# Patient Record
Sex: MALE | Race: WHITE | Employment: OTHER | URBAN - NONMETROPOLITAN AREA
[De-identification: names, ages, dates, MRNs, and addresses within clinical notes are randomized per-mention and may not be internally consistent; named-entity substitution may affect disease eponyms.]

---

## 2017-05-15 ENCOUNTER — DOCTOR'S OFFICE (OUTPATIENT)
Dept: URBAN - NONMETROPOLITAN AREA CLINIC 1 | Facility: CLINIC | Age: 67
Setting detail: OPHTHALMOLOGY
End: 2017-05-15
Payer: COMMERCIAL

## 2017-05-15 DIAGNOSIS — H43.812: ICD-10-CM

## 2017-05-15 DIAGNOSIS — H40.052: ICD-10-CM

## 2017-05-15 DIAGNOSIS — H43.811: ICD-10-CM

## 2017-05-15 DIAGNOSIS — H44.22: ICD-10-CM

## 2017-05-15 DIAGNOSIS — H44.21: ICD-10-CM

## 2017-05-15 DIAGNOSIS — H25.13: ICD-10-CM

## 2017-05-15 DIAGNOSIS — H40.051: ICD-10-CM

## 2017-05-15 PROCEDURE — 92134 CPTRZ OPH DX IMG PST SGM RTA: CPT | Performed by: OPHTHALMOLOGY

## 2017-05-15 PROCEDURE — 92226 OPHTHALMOSCOPY EXT SUBSEQUENT: CPT | Performed by: OPHTHALMOLOGY

## 2017-05-15 PROCEDURE — 92250 FUNDUS PHOTOGRAPHY W/I&R: CPT | Performed by: OPHTHALMOLOGY

## 2017-05-15 PROCEDURE — 92235 FLUORESCEIN ANGRPH MLTIFRAME: CPT | Performed by: OPHTHALMOLOGY

## 2017-05-15 PROCEDURE — 92014 COMPRE OPH EXAM EST PT 1/>: CPT | Performed by: OPHTHALMOLOGY

## 2017-05-15 ASSESSMENT — REFRACTION_CURRENTRX
OS_CYLINDER: SPH
OD_SPHERE: +2.00
OS_SPHERE: PLANO
OD_VPRISM_DIRECTION: BF
OS_OVR_VA: 20/
OD_OVR_VA: 20/
OS_OVR_VA: 20/
OS_ADD: +2.00
OD_OVR_VA: 20/
OD_CYLINDER: SPH
OD_OVR_VA: 20/
OD_VPRISM_DIRECTION: SV
OS_OVR_VA: 20/
OD_ADD: +2.00
OS_CYLINDER: SPH
OS_VPRISM_DIRECTION: SV
OS_VPRISM_DIRECTION: BF
OD_SPHERE: PLANO
OS_SPHERE: +2.00
OD_CYLINDER: SPH

## 2017-05-15 ASSESSMENT — VISUAL ACUITY
OD_BCVA: 20/30-2
OS_BCVA: 20/50+1

## 2017-05-15 ASSESSMENT — REFRACTION_MANIFEST
OS_VA3: 20/
OS_VA1: 20/
OD_VA2: 20/
OD_VA1: 20/
OD_VA1: 20/
OU_VA: 20/
OD_AXIS: 075
OD_VA1: 20/25
OS_VA1: 20/
OS_SPHERE: +0.50
OS_VA3: 20/
OS_VA2: 20/
OD_VA2: 20/
OU_VA: 20/
OS_CYLINDER: -0.50
OU_VA: 20/
OS_VA2: 20/25
OS_VA1: 20/25
OD_SPHERE: +0.50
OD_VA2: 20/25
OS_VA2: 20/
OD_CYLINDER: -0.50
OD_VA3: 20/
OD_ADD: +2.25
OD_VA3: 20/
OS_AXIS: 090
OS_ADD: +2.25
OD_VA3: 20/
OS_VA3: 20/

## 2017-05-15 ASSESSMENT — CONFRONTATIONAL VISUAL FIELD TEST (CVF)
OD_FINDINGS: FULL
OS_FINDINGS: FULL

## 2017-05-15 ASSESSMENT — SPHEQUIV_DERIVED
OS_SPHEQUIV: 0.5
OD_SPHEQUIV: 0.25
OD_SPHEQUIV: 0.25
OS_SPHEQUIV: 0.25

## 2017-05-15 ASSESSMENT — REFRACTION_AUTOREFRACTION
OD_SPHERE: +0.50
OS_CYLINDER: -1.00
OS_AXIS: 091
OD_CYLINDER: -0.50
OD_AXIS: 074
OS_SPHERE: +1.00

## 2017-08-14 ENCOUNTER — DOCTOR'S OFFICE (OUTPATIENT)
Dept: URBAN - NONMETROPOLITAN AREA CLINIC 1 | Facility: CLINIC | Age: 67
Setting detail: OPHTHALMOLOGY
End: 2017-08-14
Payer: COMMERCIAL

## 2017-08-14 DIAGNOSIS — H40.053: ICD-10-CM

## 2017-08-14 DIAGNOSIS — H43.811: ICD-10-CM

## 2017-08-14 DIAGNOSIS — H40.052: ICD-10-CM

## 2017-08-14 DIAGNOSIS — H43.813: ICD-10-CM

## 2017-08-14 DIAGNOSIS — H40.051: ICD-10-CM

## 2017-08-14 DIAGNOSIS — H44.23: ICD-10-CM

## 2017-08-14 DIAGNOSIS — H43.812: ICD-10-CM

## 2017-08-14 DIAGNOSIS — H25.13: ICD-10-CM

## 2017-08-14 PROCEDURE — 92134 CPTRZ OPH DX IMG PST SGM RTA: CPT | Performed by: OPHTHALMOLOGY

## 2017-08-14 PROCEDURE — 92014 COMPRE OPH EXAM EST PT 1/>: CPT | Performed by: OPHTHALMOLOGY

## 2017-08-14 PROCEDURE — 92226 OPHTHALMOSCOPY EXT SUBSEQUENT: CPT | Performed by: OPHTHALMOLOGY

## 2017-08-14 ASSESSMENT — REFRACTION_MANIFEST
OD_VA1: 20/
OD_VA3: 20/
OD_VA3: 20/
OD_VA1: 20/25
OD_ADD: +2.25
OS_VA3: 20/
OU_VA: 20/
OD_VA2: 20/
OS_VA3: 20/
OU_VA: 20/
OS_VA2: 20/25
OD_VA2: 20/25
OD_VA1: 20/
OS_VA2: 20/
OD_VA3: 20/
OS_VA3: 20/
OD_VA2: 20/
OS_SPHERE: +0.50
OS_VA1: 20/
OD_CYLINDER: -0.50
OD_AXIS: 075
OS_AXIS: 090
OS_VA1: 20/25
OS_ADD: +2.25
OS_CYLINDER: -0.50
OD_SPHERE: +0.50
OS_VA1: 20/
OS_VA2: 20/
OU_VA: 20/

## 2017-08-14 ASSESSMENT — REFRACTION_AUTOREFRACTION
OS_SPHERE: +1.00
OS_AXIS: 091
OD_AXIS: 074
OD_CYLINDER: -0.50
OD_SPHERE: +0.50
OS_CYLINDER: -1.00

## 2017-08-14 ASSESSMENT — REFRACTION_CURRENTRX
OS_OVR_VA: 20/
OS_CYLINDER: SPH
OS_OVR_VA: 20/
OS_OVR_VA: 20/
OD_ADD: +2.00
OD_VPRISM_DIRECTION: BF
OD_OVR_VA: 20/
OD_CYLINDER: SPH
OD_OVR_VA: 20/
OD_SPHERE: +2.00
OS_CYLINDER: SPH
OS_VPRISM_DIRECTION: SV
OD_VPRISM_DIRECTION: SV
OD_OVR_VA: 20/
OD_SPHERE: PLANO
OS_ADD: +2.00
OS_VPRISM_DIRECTION: BF
OS_SPHERE: PLANO
OS_SPHERE: +2.00
OD_CYLINDER: SPH

## 2017-08-14 ASSESSMENT — SPHEQUIV_DERIVED
OD_SPHEQUIV: 0.25
OS_SPHEQUIV: 0.25
OD_SPHEQUIV: 0.25
OS_SPHEQUIV: 0.5

## 2017-08-14 ASSESSMENT — CONFRONTATIONAL VISUAL FIELD TEST (CVF)
OD_FINDINGS: FULL
OS_FINDINGS: FULL

## 2017-08-14 ASSESSMENT — VISUAL ACUITY
OD_BCVA: 20/25+2
OS_BCVA: 20/50+2

## 2017-09-18 ENCOUNTER — DOCTOR'S OFFICE (OUTPATIENT)
Dept: URBAN - NONMETROPOLITAN AREA CLINIC 1 | Facility: CLINIC | Age: 67
Setting detail: OPHTHALMOLOGY
End: 2017-09-18

## 2017-09-18 DIAGNOSIS — H52.4: ICD-10-CM

## 2017-09-18 DIAGNOSIS — H52.03: ICD-10-CM

## 2017-09-18 PROCEDURE — 92015 DETERMINE REFRACTIVE STATE: CPT | Performed by: OPTOMETRIST

## 2017-09-18 ASSESSMENT — REFRACTION_CURRENTRX
OD_CYLINDER: SPH
OD_OVR_VA: 20/
OD_AXIS: 076
OD_VPRISM_DIRECTION: SV
OS_OVR_VA: 20/
OS_CYLINDER: SPH
OD_ADD: +2.00
OD_OVR_VA: 20/
OD_OVR_VA: 20/
OS_ADD: +2.00
OS_VPRISM_DIRECTION: SV
OS_VPRISM_DIRECTION: BF
OD_SPHERE: PLANO
OD_SPHERE: +2.75
OS_AXIS: 089
OS_SPHERE: PLANO
OD_CYLINDER: -0.50
OS_SPHERE: +2.75
OD_VPRISM_DIRECTION: BF
OS_CYLINDER: -0.50

## 2017-09-18 ASSESSMENT — SPHEQUIV_DERIVED
OD_SPHEQUIV: 0.375
OS_SPHEQUIV: 0.25

## 2017-09-18 ASSESSMENT — REFRACTION_OUTSIDERX
OD_SPHERE: +0.75
OD_CYLINDER: -0.75
OS_VA1: 20/25
OS_VA2: 20/25
OS_ADD: +2.25
OD_VA1: 20/25
OS_AXIS: 090
OD_VA3: 20/
OD_AXIS: 080
OS_VA3: 20/
OD_VA2: 20/25
OS_SPHERE: +0.50
OS_CYLINDER: -0.50
OD_ADD: +2.25
OU_VA: 20/

## 2017-09-18 ASSESSMENT — REFRACTION_MANIFEST
OD_VA3: 20/
OS_VA1: 20/
OD_VA1: 20/
OD_VA2: 20/
OD_VA3: 20/
OS_VA3: 20/
OD_VA2: 20/
OU_VA: 20/
OS_VA2: 20/
OS_VA2: 20/
OS_VA3: 20/
OS_VA1: 20/
OD_VA1: 20/
OU_VA: 20/

## 2017-09-18 ASSESSMENT — REFRACTION_AUTOREFRACTION
OD_SPHERE: +0.75
OS_AXIS: 087
OS_CYLINDER: -0.50
OS_SPHERE: +0.50
OD_CYLINDER: -0.75
OD_AXIS: 080

## 2017-09-18 ASSESSMENT — VISUAL ACUITY
OS_BCVA: 20/40+2
OD_BCVA: 20/20-2

## 2017-10-16 ENCOUNTER — DOCTOR'S OFFICE (OUTPATIENT)
Dept: URBAN - NONMETROPOLITAN AREA CLINIC 1 | Facility: CLINIC | Age: 67
Setting detail: OPHTHALMOLOGY
End: 2017-10-16
Payer: COMMERCIAL

## 2017-10-16 DIAGNOSIS — H43.813: ICD-10-CM

## 2017-10-16 DIAGNOSIS — H43.811: ICD-10-CM

## 2017-10-16 DIAGNOSIS — H43.812: ICD-10-CM

## 2017-10-16 DIAGNOSIS — H40.053: ICD-10-CM

## 2017-10-16 DIAGNOSIS — H44.21: ICD-10-CM

## 2017-10-16 DIAGNOSIS — H25.13: ICD-10-CM

## 2017-10-16 PROCEDURE — 92226 OPHTHALMOSCOPY EXT SUBSEQUENT: CPT | Performed by: OPHTHALMOLOGY

## 2017-10-16 PROCEDURE — 99214 OFFICE O/P EST MOD 30 MIN: CPT | Performed by: OPHTHALMOLOGY

## 2017-10-16 PROCEDURE — 92134 CPTRZ OPH DX IMG PST SGM RTA: CPT | Performed by: OPHTHALMOLOGY

## 2017-10-16 ASSESSMENT — REFRACTION_CURRENTRX
OS_CYLINDER: SPH
OS_CYLINDER: -0.50
OD_SPHERE: +2.75
OS_VPRISM_DIRECTION: BF
OD_OVR_VA: 20/
OD_CYLINDER: -0.50
OD_AXIS: 076
OD_ADD: +2.00
OS_OVR_VA: 20/
OD_VPRISM_DIRECTION: BF
OS_OVR_VA: 20/
OD_SPHERE: PLANO
OD_VPRISM_DIRECTION: SV
OD_OVR_VA: 20/
OS_SPHERE: PLANO
OD_OVR_VA: 20/
OS_ADD: +2.00
OS_AXIS: 089
OS_VPRISM_DIRECTION: SV
OD_CYLINDER: SPH
OS_SPHERE: +2.75
OS_OVR_VA: 20/

## 2017-10-16 ASSESSMENT — REFRACTION_AUTOREFRACTION
OD_CYLINDER: -0.75
OS_SPHERE: +0.50
OS_CYLINDER: -0.50
OS_AXIS: 087
OD_SPHERE: +0.75
OD_AXIS: 080

## 2017-10-16 ASSESSMENT — REFRACTION_OUTSIDERX
OD_SPHERE: +0.75
OD_AXIS: 080
OS_VA2: 20/25
OD_VA1: 20/25
OD_VA2: 20/25
OS_VA3: 20/
OU_VA: 20/
OS_AXIS: 090
OD_VA3: 20/
OS_ADD: +2.25
OS_CYLINDER: -0.50
OD_ADD: +2.25
OS_SPHERE: +0.50
OD_CYLINDER: -0.75
OS_VA1: 20/25

## 2017-10-16 ASSESSMENT — REFRACTION_MANIFEST
OD_VA2: 20/
OU_VA: 20/
OS_VA2: 20/
OD_VA3: 20/
OD_VA2: 20/
OS_VA1: 20/
OD_VA3: 20/
OS_VA3: 20/
OD_VA1: 20/
OS_VA1: 20/
OS_VA2: 20/
OS_VA3: 20/
OU_VA: 20/
OD_VA1: 20/

## 2017-10-16 ASSESSMENT — SPHEQUIV_DERIVED
OD_SPHEQUIV: 0.375
OS_SPHEQUIV: 0.25

## 2017-10-16 ASSESSMENT — CONFRONTATIONAL VISUAL FIELD TEST (CVF)
OD_FINDINGS: FULL
OS_FINDINGS: FULL

## 2017-10-16 ASSESSMENT — VISUAL ACUITY
OS_BCVA: 20/40
OD_BCVA: 20/25

## 2018-03-19 ENCOUNTER — DOCTOR'S OFFICE (OUTPATIENT)
Dept: URBAN - NONMETROPOLITAN AREA CLINIC 1 | Facility: CLINIC | Age: 68
Setting detail: OPHTHALMOLOGY
End: 2018-03-19
Payer: COMMERCIAL

## 2018-03-19 DIAGNOSIS — H44.21: ICD-10-CM

## 2018-03-19 DIAGNOSIS — H25.13: ICD-10-CM

## 2018-03-19 DIAGNOSIS — H40.053: ICD-10-CM

## 2018-03-19 DIAGNOSIS — H43.812: ICD-10-CM

## 2018-03-19 DIAGNOSIS — H43.811: ICD-10-CM

## 2018-03-19 DIAGNOSIS — H43.813: ICD-10-CM

## 2018-03-19 PROCEDURE — 92014 COMPRE OPH EXAM EST PT 1/>: CPT | Performed by: OPHTHALMOLOGY

## 2018-03-19 PROCEDURE — 92226 OPHTHALMOSCOPY EXT SUBSEQUENT: CPT | Performed by: OPHTHALMOLOGY

## 2018-03-19 PROCEDURE — 92134 CPTRZ OPH DX IMG PST SGM RTA: CPT | Performed by: OPHTHALMOLOGY

## 2018-03-19 ASSESSMENT — REFRACTION_AUTOREFRACTION
OS_AXIS: 087
OS_CYLINDER: -0.50
OD_CYLINDER: -0.75
OD_AXIS: 080
OS_SPHERE: +0.50
OD_SPHERE: +0.75

## 2018-03-19 ASSESSMENT — SPHEQUIV_DERIVED
OS_SPHEQUIV: 0.25
OD_SPHEQUIV: 0.375

## 2018-03-19 ASSESSMENT — REFRACTION_OUTSIDERX
OD_VA2: 20/25
OS_SPHERE: +0.50
OD_VA1: 20/25
OS_AXIS: 090
OD_AXIS: 080
OD_VA3: 20/
OD_CYLINDER: -0.75
OS_VA1: 20/25
OS_ADD: +2.25
OU_VA: 20/
OS_VA3: 20/
OD_SPHERE: +0.75
OS_VA2: 20/25
OS_CYLINDER: -0.50
OD_ADD: +2.25

## 2018-03-19 ASSESSMENT — REFRACTION_MANIFEST
OS_VA1: 20/
OS_VA3: 20/
OU_VA: 20/
OD_VA2: 20/
OS_VA2: 20/
OD_VA1: 20/
OD_VA3: 20/
OD_VA2: 20/
OS_VA1: 20/
OD_VA3: 20/
OS_VA3: 20/
OU_VA: 20/
OD_VA1: 20/
OS_VA2: 20/

## 2018-03-19 ASSESSMENT — REFRACTION_CURRENTRX
OD_OVR_VA: 20/
OD_CYLINDER: -0.50
OD_SPHERE: PLANO
OS_SPHERE: +2.75
OS_CYLINDER: -0.50
OD_AXIS: 076
OD_SPHERE: +2.75
OS_OVR_VA: 20/
OS_VPRISM_DIRECTION: BF
OS_SPHERE: PLANO
OS_ADD: +2.00
OS_OVR_VA: 20/
OD_VPRISM_DIRECTION: SV
OD_OVR_VA: 20/
OS_OVR_VA: 20/
OS_CYLINDER: SPH
OD_OVR_VA: 20/
OS_AXIS: 089
OD_CYLINDER: SPH
OS_VPRISM_DIRECTION: SV
OD_ADD: +2.00
OD_VPRISM_DIRECTION: BF

## 2018-03-19 ASSESSMENT — CONFRONTATIONAL VISUAL FIELD TEST (CVF)
OS_FINDINGS: FULL
OD_FINDINGS: FULL

## 2018-03-19 ASSESSMENT — VISUAL ACUITY
OD_BCVA: 20/20-2
OS_BCVA: 20/40-1

## 2018-05-14 ENCOUNTER — DOCTOR'S OFFICE (OUTPATIENT)
Dept: URBAN - NONMETROPOLITAN AREA CLINIC 1 | Facility: CLINIC | Age: 68
Setting detail: OPHTHALMOLOGY
End: 2018-05-14
Payer: COMMERCIAL

## 2018-05-14 DIAGNOSIS — H40.052: ICD-10-CM

## 2018-05-14 DIAGNOSIS — H40.051: ICD-10-CM

## 2018-05-14 PROCEDURE — 76514 ECHO EXAM OF EYE THICKNESS: CPT | Performed by: OPHTHALMOLOGY

## 2018-05-14 PROCEDURE — 92083 EXTENDED VISUAL FIELD XM: CPT | Performed by: OPHTHALMOLOGY

## 2018-06-18 ENCOUNTER — DOCTOR'S OFFICE (OUTPATIENT)
Dept: URBAN - NONMETROPOLITAN AREA CLINIC 1 | Facility: CLINIC | Age: 68
Setting detail: OPHTHALMOLOGY
End: 2018-06-18
Payer: COMMERCIAL

## 2018-06-18 DIAGNOSIS — H40.053: ICD-10-CM

## 2018-06-18 DIAGNOSIS — H44.21: ICD-10-CM

## 2018-06-18 DIAGNOSIS — H43.812: ICD-10-CM

## 2018-06-18 DIAGNOSIS — H43.811: ICD-10-CM

## 2018-06-18 DIAGNOSIS — H43.813: ICD-10-CM

## 2018-06-18 DIAGNOSIS — H25.13: ICD-10-CM

## 2018-06-18 PROCEDURE — 92226 OPHTHALMOSCOPY EXT SUBSEQUENT: CPT | Performed by: OPHTHALMOLOGY

## 2018-06-18 PROCEDURE — 92134 CPTRZ OPH DX IMG PST SGM RTA: CPT | Performed by: OPHTHALMOLOGY

## 2018-06-18 PROCEDURE — 92014 COMPRE OPH EXAM EST PT 1/>: CPT | Performed by: OPHTHALMOLOGY

## 2018-06-18 ASSESSMENT — REFRACTION_CURRENTRX
OS_OVR_VA: 20/
OD_OVR_VA: 20/
OS_CYLINDER: -0.50
OD_CYLINDER: -0.50
OD_VPRISM_DIRECTION: BF
OS_OVR_VA: 20/
OD_AXIS: 076
OD_OVR_VA: 20/
OD_SPHERE: +2.75
OD_VPRISM_DIRECTION: SV
OS_AXIS: 089
OD_ADD: +2.00
OS_SPHERE: PLANO
OS_ADD: +2.00
OS_CYLINDER: SPH
OD_OVR_VA: 20/
OD_CYLINDER: SPH
OS_SPHERE: +2.75
OD_SPHERE: PLANO
OS_OVR_VA: 20/
OS_VPRISM_DIRECTION: BF
OS_VPRISM_DIRECTION: SV

## 2018-06-18 ASSESSMENT — REFRACTION_MANIFEST
OU_VA: 20/
OD_VA1: 20/
OS_VA3: 20/
OU_VA: 20/
OS_VA3: 20/
OD_VA2: 20/
OD_VA3: 20/
OD_VA1: 20/
OS_VA1: 20/
OS_VA2: 20/
OD_VA2: 20/
OS_VA1: 20/
OS_VA2: 20/
OD_VA3: 20/

## 2018-06-18 ASSESSMENT — REFRACTION_OUTSIDERX
OD_CYLINDER: -0.75
OS_CYLINDER: -0.50
OS_ADD: +2.25
OD_VA2: 20/25
OD_VA3: 20/
OS_VA1: 20/25
OS_AXIS: 090
OD_ADD: +2.25
OS_SPHERE: +0.50
OD_VA1: 20/25
OD_AXIS: 080
OU_VA: 20/
OS_VA3: 20/
OD_SPHERE: +0.75
OS_VA2: 20/25

## 2018-06-18 ASSESSMENT — VISUAL ACUITY
OS_BCVA: 20/50+2
OD_BCVA: 20/25-2

## 2018-06-18 ASSESSMENT — SPHEQUIV_DERIVED
OD_SPHEQUIV: 0.375
OS_SPHEQUIV: 0.25

## 2018-06-18 ASSESSMENT — REFRACTION_AUTOREFRACTION
OS_AXIS: 087
OS_SPHERE: +0.50
OS_CYLINDER: -0.50
OD_SPHERE: +0.75
OD_CYLINDER: -0.75
OD_AXIS: 080

## 2018-06-18 ASSESSMENT — CONFRONTATIONAL VISUAL FIELD TEST (CVF)
OD_FINDINGS: FULL
OS_FINDINGS: FULL

## 2018-08-17 ENCOUNTER — DOCTOR'S OFFICE (OUTPATIENT)
Dept: URBAN - NONMETROPOLITAN AREA CLINIC 1 | Facility: CLINIC | Age: 68
Setting detail: OPHTHALMOLOGY
End: 2018-08-17
Payer: COMMERCIAL

## 2018-08-17 DIAGNOSIS — H53.123: ICD-10-CM

## 2018-08-17 DIAGNOSIS — H43.811: ICD-10-CM

## 2018-08-17 DIAGNOSIS — H44.21: ICD-10-CM

## 2018-08-17 DIAGNOSIS — H43.812: ICD-10-CM

## 2018-08-17 DIAGNOSIS — H43.813: ICD-10-CM

## 2018-08-17 DIAGNOSIS — H40.053: ICD-10-CM

## 2018-08-17 DIAGNOSIS — H25.13: ICD-10-CM

## 2018-08-17 PROCEDURE — 92134 CPTRZ OPH DX IMG PST SGM RTA: CPT | Performed by: OPHTHALMOLOGY

## 2018-08-17 PROCEDURE — 92235 FLUORESCEIN ANGRPH MLTIFRAME: CPT | Performed by: OPHTHALMOLOGY

## 2018-08-17 PROCEDURE — 92226 OPHTHALMOSCOPY EXT SUBSEQUENT: CPT | Performed by: OPHTHALMOLOGY

## 2018-08-17 PROCEDURE — 92250 FUNDUS PHOTOGRAPHY W/I&R: CPT | Performed by: OPHTHALMOLOGY

## 2018-08-17 PROCEDURE — 92014 COMPRE OPH EXAM EST PT 1/>: CPT | Performed by: OPHTHALMOLOGY

## 2018-08-17 ASSESSMENT — REFRACTION_AUTOREFRACTION
OD_SPHERE: +0.75
OS_SPHERE: +0.50
OD_CYLINDER: -0.75
OD_AXIS: 080
OS_AXIS: 087
OS_CYLINDER: -0.50

## 2018-08-17 ASSESSMENT — REFRACTION_CURRENTRX
OS_OVR_VA: 20/
OS_VPRISM_DIRECTION: SV
OD_OVR_VA: 20/
OS_SPHERE: +2.75
OD_CYLINDER: SPH
OS_CYLINDER: -0.50
OS_ADD: +2.00
OS_VPRISM_DIRECTION: BF
OD_SPHERE: +2.75
OD_ADD: +2.00
OD_VPRISM_DIRECTION: SV
OD_CYLINDER: -0.50
OS_SPHERE: PLANO
OS_AXIS: 089
OD_OVR_VA: 20/
OS_CYLINDER: SPH
OD_VPRISM_DIRECTION: BF
OD_SPHERE: PLANO
OS_OVR_VA: 20/
OD_OVR_VA: 20/
OD_AXIS: 076
OS_OVR_VA: 20/

## 2018-08-17 ASSESSMENT — REFRACTION_OUTSIDERX
OD_VA3: 20/
OS_SPHERE: +0.50
OD_ADD: +2.25
OS_ADD: +2.25
OS_AXIS: 090
OS_CYLINDER: -0.50
OS_VA3: 20/
OS_VA2: 20/25
OD_CYLINDER: -0.75
OD_SPHERE: +0.75
OU_VA: 20/
OD_VA2: 20/25
OS_VA1: 20/25
OD_VA1: 20/25
OD_AXIS: 080

## 2018-08-17 ASSESSMENT — REFRACTION_MANIFEST
OS_VA3: 20/
OD_VA2: 20/
OS_VA3: 20/
OD_VA3: 20/
OS_VA2: 20/
OD_VA3: 20/
OU_VA: 20/
OD_VA2: 20/
OS_VA2: 20/
OD_VA1: 20/
OU_VA: 20/
OD_VA1: 20/
OS_VA1: 20/
OS_VA1: 20/

## 2018-08-17 ASSESSMENT — CONFRONTATIONAL VISUAL FIELD TEST (CVF)
OS_FINDINGS: FULL
OD_FINDINGS: FULL

## 2018-08-17 ASSESSMENT — VISUAL ACUITY
OS_BCVA: 20/40-2
OD_BCVA: 20/30-1

## 2018-08-17 ASSESSMENT — SPHEQUIV_DERIVED
OD_SPHEQUIV: 0.375
OS_SPHEQUIV: 0.25

## 2018-08-24 ENCOUNTER — DOCTOR'S OFFICE (OUTPATIENT)
Dept: URBAN - NONMETROPOLITAN AREA CLINIC 1 | Facility: CLINIC | Age: 68
Setting detail: OPHTHALMOLOGY
End: 2018-08-24
Payer: COMMERCIAL

## 2018-08-24 ENCOUNTER — RX ONLY (RX ONLY)
Age: 68
End: 2018-08-24

## 2018-08-24 DIAGNOSIS — H25.13: ICD-10-CM

## 2018-08-24 DIAGNOSIS — H40.053: ICD-10-CM

## 2018-08-24 DIAGNOSIS — H44.21: ICD-10-CM

## 2018-08-24 DIAGNOSIS — H43.813: ICD-10-CM

## 2018-08-24 DIAGNOSIS — H53.123: ICD-10-CM

## 2018-08-24 PROCEDURE — 92133 CPTRZD OPH DX IMG PST SGM ON: CPT | Performed by: OPHTHALMOLOGY

## 2018-08-24 PROCEDURE — 92014 COMPRE OPH EXAM EST PT 1/>: CPT | Performed by: OPHTHALMOLOGY

## 2018-08-24 ASSESSMENT — SPHEQUIV_DERIVED
OS_SPHEQUIV: 0.875
OD_SPHEQUIV: 0.75

## 2018-08-24 ASSESSMENT — REFRACTION_OUTSIDERX
OS_CYLINDER: -0.50
OS_ADD: +2.25
OD_ADD: +2.25
OD_CYLINDER: -0.75
OS_VA1: 20/25
OS_SPHERE: +0.50
OD_VA2: 20/25
OD_SPHERE: +0.75
OS_VA2: 20/25
OU_VA: 20/
OS_VA3: 20/
OD_VA1: 20/25
OD_AXIS: 080
OD_VA3: 20/
OS_AXIS: 090

## 2018-08-24 ASSESSMENT — REFRACTION_CURRENTRX
OS_SPHERE: +2.75
OS_OVR_VA: 20/
OD_OVR_VA: 20/
OS_OVR_VA: 20/
OS_VPRISM_DIRECTION: BF
OD_CYLINDER: SPH
OD_CYLINDER: -0.50
OS_SPHERE: PLANO
OS_AXIS: 089
OD_SPHERE: PLANO
OD_VPRISM_DIRECTION: SV
OS_ADD: +2.00
OD_AXIS: 076
OD_OVR_VA: 20/
OD_OVR_VA: 20/
OD_VPRISM_DIRECTION: BF
OD_ADD: +2.00
OS_VPRISM_DIRECTION: SV
OS_CYLINDER: -0.50
OS_CYLINDER: SPH
OD_SPHERE: +2.75
OS_OVR_VA: 20/

## 2018-08-24 ASSESSMENT — VISUAL ACUITY
OS_BCVA: 20/30-2
OD_BCVA: 20/25-2

## 2018-08-24 ASSESSMENT — REFRACTION_MANIFEST
OD_VA3: 20/
OU_VA: 20/
OS_VA1: 20/
OS_VA3: 20/
OS_VA2: 20/
OD_VA1: 20/
OD_VA1: 20/
OU_VA: 20/
OS_VA1: 20/
OD_VA2: 20/
OS_VA3: 20/
OD_VA2: 20/
OS_VA2: 20/
OD_VA3: 20/

## 2018-08-24 ASSESSMENT — REFRACTION_AUTOREFRACTION
OD_SPHERE: +1.00
OS_CYLINDER: -1.25
OS_SPHERE: +1.50
OD_CYLINDER: -0.50
OS_AXIS: 090
OD_AXIS: 099

## 2018-08-24 ASSESSMENT — CONFRONTATIONAL VISUAL FIELD TEST (CVF)
OS_FINDINGS: FULL
OD_FINDINGS: FULL

## 2018-10-19 ENCOUNTER — DOCTOR'S OFFICE (OUTPATIENT)
Dept: URBAN - NONMETROPOLITAN AREA CLINIC 1 | Facility: CLINIC | Age: 68
Setting detail: OPHTHALMOLOGY
End: 2018-10-19
Payer: COMMERCIAL

## 2018-10-19 DIAGNOSIS — H04.122: ICD-10-CM

## 2018-10-19 DIAGNOSIS — H43.811: ICD-10-CM

## 2018-10-19 DIAGNOSIS — H40.053: ICD-10-CM

## 2018-10-19 DIAGNOSIS — H43.812: ICD-10-CM

## 2018-10-19 DIAGNOSIS — H04.121: ICD-10-CM

## 2018-10-19 DIAGNOSIS — H44.21: ICD-10-CM

## 2018-10-19 DIAGNOSIS — H43.813: ICD-10-CM

## 2018-10-19 PROCEDURE — 92134 CPTRZ OPH DX IMG PST SGM RTA: CPT | Performed by: OPHTHALMOLOGY

## 2018-10-19 PROCEDURE — 83861 MICROFLUID ANALY TEARS: CPT | Performed by: OPHTHALMOLOGY

## 2018-10-19 PROCEDURE — 92014 COMPRE OPH EXAM EST PT 1/>: CPT | Performed by: OPHTHALMOLOGY

## 2018-10-19 PROCEDURE — 92226 OPHTHALMOSCOPY EXT SUBSEQUENT: CPT | Performed by: OPHTHALMOLOGY

## 2018-10-19 ASSESSMENT — REFRACTION_AUTOREFRACTION
OD_SPHERE: +1.00
OD_AXIS: 099
OD_CYLINDER: -0.50
OS_AXIS: 090
OS_SPHERE: +1.50
OS_CYLINDER: -1.25

## 2018-10-19 ASSESSMENT — REFRACTION_CURRENTRX
OS_CYLINDER: SPH
OS_CYLINDER: -0.50
OD_VPRISM_DIRECTION: BF
OS_SPHERE: PLANO
OD_SPHERE: +2.75
OS_VPRISM_DIRECTION: BF
OS_OVR_VA: 20/
OD_OVR_VA: 20/
OS_OVR_VA: 20/
OD_CYLINDER: SPH
OS_AXIS: 089
OS_SPHERE: +2.75
OD_SPHERE: PLANO
OD_OVR_VA: 20/
OS_VPRISM_DIRECTION: SV
OD_ADD: +2.00
OD_VPRISM_DIRECTION: SV
OD_AXIS: 076
OD_OVR_VA: 20/
OS_ADD: +2.00
OD_CYLINDER: -0.50
OS_OVR_VA: 20/

## 2018-10-19 ASSESSMENT — REFRACTION_MANIFEST
OS_VA3: 20/
OD_VA1: 20/
OS_VA2: 20/
OU_VA: 20/
OS_CYLINDER: -0.50
OS_VA3: 20/
OD_AXIS: 080
OS_VA2: 20/25
OD_SPHERE: +0.75
OS_VA1: 20/
OD_ADD: +2.25
OD_VA2: 20/25
OS_ADD: +2.25
OD_VA3: 20/
OS_AXIS: 090
OD_VA2: 20/
OS_SPHERE: +0.50
OD_VA1: 20/25
OD_CYLINDER: -0.75
OU_VA: 20/
OD_VA3: 20/
OS_VA1: 20/25

## 2018-10-19 ASSESSMENT — CONFRONTATIONAL VISUAL FIELD TEST (CVF)
OD_FINDINGS: FULL
OS_FINDINGS: FULL

## 2018-10-19 ASSESSMENT — SPHEQUIV_DERIVED
OS_SPHEQUIV: 0.875
OD_SPHEQUIV: 0.375
OD_SPHEQUIV: 0.75
OS_SPHEQUIV: 0.25

## 2018-10-19 ASSESSMENT — VISUAL ACUITY
OS_BCVA: 20/30-2
OD_BCVA: 20/30-2

## 2018-12-10 ENCOUNTER — DOCTOR'S OFFICE (OUTPATIENT)
Dept: URBAN - NONMETROPOLITAN AREA CLINIC 1 | Facility: CLINIC | Age: 68
Setting detail: OPHTHALMOLOGY
End: 2018-12-10
Payer: COMMERCIAL

## 2018-12-10 DIAGNOSIS — H44.21: ICD-10-CM

## 2018-12-10 DIAGNOSIS — H04.122: ICD-10-CM

## 2018-12-10 DIAGNOSIS — H40.053: ICD-10-CM

## 2018-12-10 DIAGNOSIS — H43.813: ICD-10-CM

## 2018-12-10 DIAGNOSIS — H04.121: ICD-10-CM

## 2018-12-10 PROCEDURE — 92014 COMPRE OPH EXAM EST PT 1/>: CPT | Performed by: OPHTHALMOLOGY

## 2018-12-10 PROCEDURE — 83861 MICROFLUID ANALY TEARS: CPT | Performed by: OPHTHALMOLOGY

## 2018-12-10 PROCEDURE — 92134 CPTRZ OPH DX IMG PST SGM RTA: CPT | Performed by: OPHTHALMOLOGY

## 2018-12-10 ASSESSMENT — REFRACTION_CURRENTRX
OD_OVR_VA: 20/
OD_OVR_VA: 20/
OD_VPRISM_DIRECTION: BF
OS_AXIS: 089
OD_CYLINDER: -0.50
OS_ADD: +2.00
OD_CYLINDER: SPH
OD_ADD: +2.00
OD_OVR_VA: 20/
OD_VPRISM_DIRECTION: SV
OD_AXIS: 076
OS_CYLINDER: SPH
OS_OVR_VA: 20/
OS_SPHERE: PLANO
OD_SPHERE: PLANO
OS_VPRISM_DIRECTION: BF
OS_OVR_VA: 20/
OS_VPRISM_DIRECTION: SV
OS_CYLINDER: -0.50
OD_SPHERE: +2.75
OS_SPHERE: +2.75
OS_OVR_VA: 20/

## 2018-12-10 ASSESSMENT — REFRACTION_AUTOREFRACTION
OD_CYLINDER: -0.50
OD_SPHERE: +1.00
OS_SPHERE: +1.50
OD_AXIS: 099
OS_AXIS: 090
OS_CYLINDER: -1.25

## 2018-12-10 ASSESSMENT — REFRACTION_MANIFEST
OS_VA1: 20/
OD_CYLINDER: -0.75
OD_AXIS: 080
OD_ADD: +2.25
OD_VA1: 20/
OS_VA3: 20/
OD_SPHERE: +0.75
OD_VA1: 20/25
OD_VA2: 20/
OS_VA3: 20/
OS_AXIS: 090
OD_VA3: 20/
OS_ADD: +2.25
OU_VA: 20/
OD_VA3: 20/
OS_VA2: 20/25
OS_VA2: 20/
OD_VA2: 20/25
OU_VA: 20/
OS_CYLINDER: -0.50
OS_SPHERE: +0.50
OS_VA1: 20/25

## 2018-12-10 ASSESSMENT — VISUAL ACUITY
OD_BCVA: 20/25-2
OS_BCVA: 20/25-2

## 2018-12-10 ASSESSMENT — SPHEQUIV_DERIVED
OS_SPHEQUIV: 0.875
OD_SPHEQUIV: 0.75
OD_SPHEQUIV: 0.375
OS_SPHEQUIV: 0.25

## 2018-12-10 ASSESSMENT — CONFRONTATIONAL VISUAL FIELD TEST (CVF)
OD_FINDINGS: FULL
OS_FINDINGS: FULL

## 2019-05-30 ENCOUNTER — DOCTOR'S OFFICE (OUTPATIENT)
Dept: URBAN - NONMETROPOLITAN AREA CLINIC 1 | Facility: CLINIC | Age: 69
Setting detail: OPHTHALMOLOGY
End: 2019-05-30
Payer: COMMERCIAL

## 2019-05-30 DIAGNOSIS — H43.812: ICD-10-CM

## 2019-05-30 DIAGNOSIS — H43.811: ICD-10-CM

## 2019-05-30 DIAGNOSIS — H35.3232: ICD-10-CM

## 2019-05-30 DIAGNOSIS — H43.813: ICD-10-CM

## 2019-05-30 DIAGNOSIS — H04.122: ICD-10-CM

## 2019-05-30 DIAGNOSIS — H25.13: ICD-10-CM

## 2019-05-30 DIAGNOSIS — H04.123: ICD-10-CM

## 2019-05-30 DIAGNOSIS — H04.121: ICD-10-CM

## 2019-05-30 PROCEDURE — 83861 MICROFLUID ANALY TEARS: CPT | Performed by: OPHTHALMOLOGY

## 2019-05-30 PROCEDURE — 92250 FUNDUS PHOTOGRAPHY W/I&R: CPT | Performed by: OPHTHALMOLOGY

## 2019-05-30 PROCEDURE — 92235 FLUORESCEIN ANGRPH MLTIFRAME: CPT | Performed by: OPHTHALMOLOGY

## 2019-05-30 PROCEDURE — 92134 CPTRZ OPH DX IMG PST SGM RTA: CPT | Performed by: OPHTHALMOLOGY

## 2019-05-30 PROCEDURE — 92226 OPHTHALMOSCOPY EXT SUBSEQUENT: CPT | Performed by: OPHTHALMOLOGY

## 2019-05-30 PROCEDURE — 92014 COMPRE OPH EXAM EST PT 1/>: CPT | Performed by: OPHTHALMOLOGY

## 2019-05-30 ASSESSMENT — REFRACTION_CURRENTRX
OD_SPHERE: +2.75
OS_SPHERE: +2.75
OD_VPRISM_DIRECTION: BF
OS_CYLINDER: -0.50
OS_CYLINDER: SPH
OD_ADD: +2.00
OS_VPRISM_DIRECTION: BF
OD_CYLINDER: -0.50
OS_VPRISM_DIRECTION: SV
OD_CYLINDER: SPH
OD_OVR_VA: 20/
OS_OVR_VA: 20/
OD_OVR_VA: 20/
OD_VPRISM_DIRECTION: SV
OS_SPHERE: PLANO
OS_ADD: +2.00
OS_OVR_VA: 20/
OD_AXIS: 076
OD_SPHERE: PLANO
OD_OVR_VA: 20/
OS_OVR_VA: 20/
OS_AXIS: 089

## 2019-05-30 ASSESSMENT — REFRACTION_MANIFEST
OS_VA1: 20/25
OD_SPHERE: +0.75
OD_VA1: 20/
OU_VA: 20/
OD_CYLINDER: -0.75
OD_VA3: 20/
OS_SPHERE: +0.50
OD_VA2: 20/
OS_VA3: 20/
OD_AXIS: 080
OD_VA1: 20/25
OS_ADD: +2.25
OS_AXIS: 090
OU_VA: 20/
OD_ADD: +2.25
OD_VA2: 20/25
OD_VA3: 20/
OS_VA1: 20/
OS_VA2: 20/
OS_VA3: 20/
OS_VA2: 20/25
OS_CYLINDER: -0.50

## 2019-05-30 ASSESSMENT — SPHEQUIV_DERIVED
OS_SPHEQUIV: 0.875
OD_SPHEQUIV: 0.75
OD_SPHEQUIV: 0.375
OS_SPHEQUIV: 0.25

## 2019-05-30 ASSESSMENT — REFRACTION_AUTOREFRACTION
OS_SPHERE: +1.50
OS_AXIS: 090
OD_AXIS: 099
OD_CYLINDER: -0.50
OS_CYLINDER: -1.25
OD_SPHERE: +1.00

## 2019-05-30 ASSESSMENT — CONFRONTATIONAL VISUAL FIELD TEST (CVF)
OD_FINDINGS: FULL
OS_FINDINGS: FULL

## 2019-05-30 ASSESSMENT — VISUAL ACUITY
OS_BCVA: 20/40+2
OD_BCVA: 20/30-1

## 2019-06-05 ENCOUNTER — DOCTOR'S OFFICE (OUTPATIENT)
Dept: URBAN - NONMETROPOLITAN AREA CLINIC 1 | Facility: CLINIC | Age: 69
Setting detail: OPHTHALMOLOGY
End: 2019-06-05
Payer: COMMERCIAL

## 2019-06-05 DIAGNOSIS — H04.122: ICD-10-CM

## 2019-06-05 DIAGNOSIS — H43.813: ICD-10-CM

## 2019-06-05 DIAGNOSIS — H35.3232: ICD-10-CM

## 2019-06-05 DIAGNOSIS — H40.053: ICD-10-CM

## 2019-06-05 DIAGNOSIS — H44.21: ICD-10-CM

## 2019-06-05 DIAGNOSIS — H04.121: ICD-10-CM

## 2019-06-05 PROCEDURE — 83861 MICROFLUID ANALY TEARS: CPT | Performed by: OPHTHALMOLOGY

## 2019-06-05 PROCEDURE — 92014 COMPRE OPH EXAM EST PT 1/>: CPT | Performed by: OPHTHALMOLOGY

## 2019-06-05 PROCEDURE — 76514 ECHO EXAM OF EYE THICKNESS: CPT | Performed by: OPHTHALMOLOGY

## 2019-06-05 PROCEDURE — 92083 EXTENDED VISUAL FIELD XM: CPT | Performed by: OPHTHALMOLOGY

## 2019-06-05 ASSESSMENT — REFRACTION_CURRENTRX
OD_AXIS: 076
OD_SPHERE: +2.75
OS_ADD: +2.00
OD_CYLINDER: -0.50
OD_VPRISM_DIRECTION: SV
OS_CYLINDER: SPH
OS_VPRISM_DIRECTION: SV
OD_VPRISM_DIRECTION: BF
OS_AXIS: 089
OS_SPHERE: +2.75
OS_OVR_VA: 20/
OD_OVR_VA: 20/
OD_OVR_VA: 20/
OD_CYLINDER: SPH
OD_OVR_VA: 20/
OS_OVR_VA: 20/
OD_SPHERE: PLANO
OS_OVR_VA: 20/
OS_CYLINDER: -0.50
OD_ADD: +2.00
OS_VPRISM_DIRECTION: BF
OS_SPHERE: PLANO

## 2019-06-05 ASSESSMENT — REFRACTION_MANIFEST
OD_ADD: +2.25
OS_VA1: 20/
OD_VA3: 20/
OD_CYLINDER: -0.75
OU_VA: 20/
OS_SPHERE: +0.50
OD_SPHERE: +0.75
OS_AXIS: 090
OU_VA: 20/
OD_VA1: 20/
OD_VA3: 20/
OD_VA2: 20/25
OS_VA2: 20/
OD_VA1: 20/25
OD_AXIS: 080
OS_VA3: 20/
OS_VA1: 20/25
OS_CYLINDER: -0.50
OD_VA2: 20/
OS_ADD: +2.25
OS_VA3: 20/
OS_VA2: 20/25

## 2019-06-05 ASSESSMENT — PACHYMETRY
OD_CT_UM: 578
OS_CT_CORRECTION: -3
OS_CT_UM: 588
OD_CT_CORRECTION: -2

## 2019-06-05 ASSESSMENT — REFRACTION_AUTOREFRACTION
OD_CYLINDER: -1.00
OD_AXIS: 092
OS_SPHERE: +1.00
OS_CYLINDER: -1.25
OS_AXIS: 078
OD_SPHERE: +1.50

## 2019-06-05 ASSESSMENT — SPHEQUIV_DERIVED
OD_SPHEQUIV: 0.375
OD_SPHEQUIV: 1
OS_SPHEQUIV: 0.25
OS_SPHEQUIV: 0.375

## 2019-06-05 ASSESSMENT — VISUAL ACUITY
OS_BCVA: 20/25-2
OD_BCVA: 20/40+1

## 2019-06-05 ASSESSMENT — CONFRONTATIONAL VISUAL FIELD TEST (CVF)
OD_FINDINGS: FULL
OS_FINDINGS: FULL

## 2019-07-22 ENCOUNTER — RX ONLY (RX ONLY)
Age: 69
End: 2019-07-22

## 2019-07-22 ENCOUNTER — DOCTOR'S OFFICE (OUTPATIENT)
Dept: URBAN - NONMETROPOLITAN AREA CLINIC 1 | Facility: CLINIC | Age: 69
Setting detail: OPHTHALMOLOGY
End: 2019-07-22
Payer: COMMERCIAL

## 2019-07-22 DIAGNOSIS — H04.122: ICD-10-CM

## 2019-07-22 DIAGNOSIS — H35.3232: ICD-10-CM

## 2019-07-22 DIAGNOSIS — H43.813: ICD-10-CM

## 2019-07-22 DIAGNOSIS — H43.811: ICD-10-CM

## 2019-07-22 DIAGNOSIS — H04.123: ICD-10-CM

## 2019-07-22 DIAGNOSIS — H43.812: ICD-10-CM

## 2019-07-22 DIAGNOSIS — H04.121: ICD-10-CM

## 2019-07-22 PROCEDURE — 83861 MICROFLUID ANALY TEARS: CPT | Performed by: OPHTHALMOLOGY

## 2019-07-22 PROCEDURE — 92014 COMPRE OPH EXAM EST PT 1/>: CPT | Performed by: OPHTHALMOLOGY

## 2019-07-22 PROCEDURE — 92134 CPTRZ OPH DX IMG PST SGM RTA: CPT | Performed by: OPHTHALMOLOGY

## 2019-07-22 PROCEDURE — 92226 OPHTHALMOSCOPY EXT SUBSEQUENT: CPT | Performed by: OPHTHALMOLOGY

## 2019-07-22 ASSESSMENT — REFRACTION_MANIFEST
OS_AXIS: 090
OS_ADD: +2.25
OS_VA1: 20/
OS_VA2: 20/25
OD_VA2: 20/25
OU_VA: 20/
OS_CYLINDER: -0.50
OS_VA3: 20/
OD_VA2: 20/
OD_VA3: 20/
OS_VA1: 20/25
OS_VA2: 20/
OU_VA: 20/
OD_ADD: +2.25
OD_AXIS: 080
OD_CYLINDER: -0.75
OD_VA1: 20/25
OD_VA3: 20/
OD_SPHERE: +0.75
OD_VA1: 20/
OS_VA3: 20/
OS_SPHERE: +0.50

## 2019-07-22 ASSESSMENT — REFRACTION_CURRENTRX
OD_OVR_VA: 20/
OS_OVR_VA: 20/
OD_CYLINDER: SPH
OS_CYLINDER: SPH
OD_SPHERE: +2.75
OS_SPHERE: PLANO
OD_OVR_VA: 20/
OD_OVR_VA: 20/
OS_VPRISM_DIRECTION: SV
OD_VPRISM_DIRECTION: BF
OS_CYLINDER: -0.50
OS_ADD: +2.00
OD_ADD: +2.00
OS_OVR_VA: 20/
OD_CYLINDER: -0.50
OD_SPHERE: PLANO
OS_OVR_VA: 20/
OD_VPRISM_DIRECTION: SV
OS_VPRISM_DIRECTION: BF
OS_AXIS: 089
OD_AXIS: 076
OS_SPHERE: +2.75

## 2019-07-22 ASSESSMENT — VISUAL ACUITY
OS_BCVA: 20/40-1
OD_BCVA: 20/30+1

## 2019-07-22 ASSESSMENT — REFRACTION_AUTOREFRACTION
OS_CYLINDER: -1.25
OD_AXIS: 092
OS_AXIS: 078
OS_SPHERE: +1.00
OD_SPHERE: +1.50
OD_CYLINDER: -1.00

## 2019-07-22 ASSESSMENT — CONFRONTATIONAL VISUAL FIELD TEST (CVF)
OS_FINDINGS: FULL
OD_FINDINGS: FULL

## 2019-07-22 ASSESSMENT — SPHEQUIV_DERIVED
OS_SPHEQUIV: 0.25
OD_SPHEQUIV: 0.375
OD_SPHEQUIV: 1
OS_SPHEQUIV: 0.375

## 2019-08-19 ENCOUNTER — DOCTOR'S OFFICE (OUTPATIENT)
Dept: URBAN - NONMETROPOLITAN AREA CLINIC 1 | Facility: CLINIC | Age: 69
Setting detail: OPHTHALMOLOGY
End: 2019-08-19
Payer: COMMERCIAL

## 2019-08-19 DIAGNOSIS — H04.123: ICD-10-CM

## 2019-08-19 DIAGNOSIS — H44.21: ICD-10-CM

## 2019-08-19 DIAGNOSIS — H43.813: ICD-10-CM

## 2019-08-19 DIAGNOSIS — H35.3232: ICD-10-CM

## 2019-08-19 PROCEDURE — 92134 CPTRZ OPH DX IMG PST SGM RTA: CPT | Performed by: OPHTHALMOLOGY

## 2019-08-19 PROCEDURE — 92014 COMPRE OPH EXAM EST PT 1/>: CPT | Performed by: OPHTHALMOLOGY

## 2019-08-19 ASSESSMENT — SPHEQUIV_DERIVED
OS_SPHEQUIV: 0.375
OD_SPHEQUIV: 0.375
OS_SPHEQUIV: 0.25
OD_SPHEQUIV: 1

## 2019-08-19 ASSESSMENT — REFRACTION_AUTOREFRACTION
OD_AXIS: 092
OS_SPHERE: +1.00
OD_SPHERE: +1.50
OD_CYLINDER: -1.00
OS_AXIS: 078
OS_CYLINDER: -1.25

## 2019-08-19 ASSESSMENT — REFRACTION_CURRENTRX
OD_OVR_VA: 20/
OD_SPHERE: PLANO
OS_CYLINDER: SPH
OD_OVR_VA: 20/
OS_OVR_VA: 20/
OS_SPHERE: PLANO
OD_ADD: +2.00
OS_AXIS: 089
OD_OVR_VA: 20/
OD_CYLINDER: SPH
OS_OVR_VA: 20/
OS_CYLINDER: -0.50
OS_SPHERE: +2.75
OD_AXIS: 076
OD_SPHERE: +2.75
OS_OVR_VA: 20/
OS_VPRISM_DIRECTION: SV
OD_CYLINDER: -0.50
OD_VPRISM_DIRECTION: BF
OS_VPRISM_DIRECTION: BF
OD_VPRISM_DIRECTION: SV
OS_ADD: +2.00

## 2019-08-19 ASSESSMENT — REFRACTION_MANIFEST
OU_VA: 20/
OS_VA2: 20/25
OD_SPHERE: +0.75
OD_VA3: 20/
OS_VA1: 20/25
OD_AXIS: 080
OS_ADD: +2.25
OS_VA3: 20/
OD_VA2: 20/25
OD_ADD: +2.25
OD_VA1: 20/25
OS_VA2: 20/
OD_VA2: 20/
OU_VA: 20/
OS_CYLINDER: -0.50
OD_VA3: 20/
OD_VA1: 20/
OS_SPHERE: +0.50
OD_CYLINDER: -0.75
OS_AXIS: 090
OS_VA3: 20/
OS_VA1: 20/

## 2019-08-19 ASSESSMENT — CONFRONTATIONAL VISUAL FIELD TEST (CVF)
OS_FINDINGS: FULL
OD_FINDINGS: FULL

## 2019-08-19 ASSESSMENT — VISUAL ACUITY
OS_BCVA: 20/40+1
OD_BCVA: 20/25-2

## 2019-08-29 ENCOUNTER — OFFICE VISIT (OUTPATIENT)
Dept: PULMONOLOGY | Facility: HOSPITAL | Age: 69
End: 2019-08-29
Payer: MEDICARE

## 2019-08-29 VITALS
DIASTOLIC BLOOD PRESSURE: 84 MMHG | BODY MASS INDEX: 41.75 KG/M2 | HEIGHT: 73 IN | WEIGHT: 315 LBS | HEART RATE: 69 BPM | OXYGEN SATURATION: 95 % | SYSTOLIC BLOOD PRESSURE: 140 MMHG

## 2019-08-29 DIAGNOSIS — J41.8 MIXED SIMPLE AND MUCOPURULENT CHRONIC BRONCHITIS (HCC): Primary | ICD-10-CM

## 2019-08-29 DIAGNOSIS — C34.31 MALIGNANT NEOPLASM OF LOWER LOBE OF RIGHT LUNG (HCC): ICD-10-CM

## 2019-08-29 DIAGNOSIS — Z99.81 HYPOXEMIA REQUIRING SUPPLEMENTAL OXYGEN: ICD-10-CM

## 2019-08-29 DIAGNOSIS — Z86.711 HISTORY OF PULMONARY EMBOLISM: ICD-10-CM

## 2019-08-29 DIAGNOSIS — R09.02 HYPOXEMIA REQUIRING SUPPLEMENTAL OXYGEN: ICD-10-CM

## 2019-08-29 PROCEDURE — 94618 PULMONARY STRESS TESTING: CPT | Performed by: INTERNAL MEDICINE

## 2019-08-29 PROCEDURE — 99205 OFFICE O/P NEW HI 60 MIN: CPT | Performed by: INTERNAL MEDICINE

## 2019-08-29 RX ORDER — APIXABAN 5 MG/1
5 TABLET, FILM COATED ORAL 2 TIMES DAILY
Refills: 5 | COMMUNITY
Start: 2019-08-12 | End: 2020-01-23 | Stop reason: ALTCHOICE

## 2019-08-29 RX ORDER — ATORVASTATIN CALCIUM 20 MG/1
TABLET, FILM COATED ORAL
COMMUNITY
Start: 2019-08-07

## 2019-08-29 RX ORDER — ASPIRIN 81 MG/1
81 TABLET, CHEWABLE ORAL DAILY
COMMUNITY

## 2019-08-29 RX ORDER — ALBUTEROL SULFATE 90 UG/1
2 AEROSOL, METERED RESPIRATORY (INHALATION) EVERY 6 HOURS PRN
Qty: 1 INHALER | Refills: 11 | Status: SHIPPED | OUTPATIENT
Start: 2019-08-29 | End: 2021-10-06 | Stop reason: SDUPTHER

## 2019-08-29 RX ORDER — AMLODIPINE BESYLATE 2.5 MG/1
TABLET ORAL
COMMUNITY
Start: 2019-08-07

## 2019-08-29 RX ORDER — LIDOCAINE 40 MG/G
CREAM TOPICAL AS NEEDED
Qty: 30 G | Refills: 5 | Status: SHIPPED | OUTPATIENT
Start: 2019-08-29 | End: 2020-01-23 | Stop reason: ALTCHOICE

## 2019-08-29 RX ORDER — BRIMONIDINE TARTRATE 0.1 %
DROPS OPHTHALMIC (EYE)
Refills: 3 | COMMUNITY
Start: 2019-08-12 | End: 2020-01-23 | Stop reason: ALTCHOICE

## 2019-08-29 RX ORDER — UMECLIDINIUM BROMIDE AND VILANTEROL TRIFENATATE 62.5; 25 UG/1; UG/1
POWDER RESPIRATORY (INHALATION)
Refills: 5 | COMMUNITY
Start: 2019-08-12 | End: 2019-08-29

## 2019-08-29 NOTE — ASSESSMENT & PLAN NOTE
He had a post operative pulmonary 12/27 for which he's been on Eliquis    He was advised to continue Eliquis until Sept

## 2019-08-29 NOTE — ASSESSMENT & PLAN NOTE
He is s/p resection on Dec 20, 2019 for 2 (+) nodules  He had 4/12 (+) lymph nodes and received 4 courses of chemotherapy  Last CTChest from 8/22 shows NAD and post op changes

## 2019-08-29 NOTE — ASSESSMENT & PLAN NOTE
He dropped to 88% after 3 minutes of exercise  O2 sats are maintained w/ 2 lpm and he is able to walk farther  Will resume his O2 for home use and portability

## 2019-08-29 NOTE — PATIENT INSTRUCTIONS
Mixed simple and mucopurulent chronic bronchitis ([facility] [address] )  I agree w/ the Anoro as the best therapy for COPD  This should be maintenance to sustain current level of lung function  Albuterol inhaler or nebulizer can be used for rescue  Check yearly PFT  He is due in Nov/Dev    Hypoxemia requiring supplemental oxygen  He dropped to 88% after 3 minutes of exercise  O2 sats are maintained w/ 2 lpm and he is able to walk farther  Will resume his O2 for home use and portability  History of pulmonary embolism  He had a post operative pulmonary 12/27 for which he's been on Eliquis  He was advised to continue Eliquis until Sept     Malignant neoplasm of lower lobe of right lung [facility])  He is s/p resection on Dec 20, 2019 for 2 (+) nodules  He had 4/12 (+) lymph nodes and received 4 courses of chemotherapy  Last CTChest from 8/22 shows NAD and post op changes

## 2019-08-29 NOTE — ASSESSMENT & PLAN NOTE
I agree w/ the Anoro as the best therapy for COPD  This should be maintenance to sustain current level of lung function  Albuterol inhaler or nebulizer can be used for rescue  Check yearly PFT    He is due in Nov/Dev

## 2019-08-29 NOTE — PROGRESS NOTES
Assessment/Plan:    Mixed simple and mucopurulent chronic bronchitis ([address] )  I agree w/ the Anoro as the best therapy for COPD  This should be maintenance to sustain current level of lung function  Albuterol inhaler or nebulizer can be used for rescue  Check yearly PFT  He is due in Nov/Dev    Hypoxemia requiring supplemental oxygen  He dropped to 88% after 3 minutes of exercise  O2 sats are maintained w/ 2 lpm and he is able to walk farther  Will resume his O2 for home use and portability  History of pulmonary embolism  He had a post operative pulmonary 12/27 for which he's been on Eliquis  He was advised to continue Eliquis until Sept     Malignant neoplasm of lower lobe of right lung [facility])  He is s/p resection on Dec 20, 2019 for 2 (+) nodules  He had 4/12 (+) lymph nodes and received 4 courses of chemotherapy  Last CTChest from 8/22 shows NAD and post op changes  Diagnoses and all orders for this visit:    Mixed simple and mucopurulent chronic bronchitis (HCC)  -     albuterol (VENTOLIN HFA) 90 mcg/act inhaler; Inhale 2 puffs every 6 (six) hours as needed for wheezing  -     umeclidinium-vilanterol (ANORO ELLIPTA) 62 5-25 MCG/INH inhaler; Inhale 1 puff daily  -     Complete PFT with post bronchodilator; Future  -     Home Oxygen with Portability  -     Complete PFT with Post Bronchodilator and ABG; Future  -     POCT 6 minute walk    Hypoxemia requiring supplemental oxygen  -     Home Oxygen with Portability    History of pulmonary embolism    Malignant neoplasm of lower lobe of right lung (HCC)  -     Complete PFT with post bronchodilator; Future  -     lidocaine (LMX) 4 % cream; Apply topically as needed for mild pain To Right chest   -     Complete PFT with Post Bronchodilator and ABG; Future  -     POCT 6 minute walk    Other orders  -     amLODIPine (NORVASC) 2 5 mg tablet  -     ELIQUIS 5 MG;  Take 5 mg by mouth 2 (two) times a day  -     atorvastatin (LIPITOR) 20 mg tablet  -     ALPHAGAN P 0 1 %; INSTILL 1 DROP INTO BOTH EYES TWICE A DAY  -     Discontinue: ANORO ELLIPTA 62 5-25 MCG/INH inhaler; ONE INHALATION DAILY  -     aspirin 81 mg chewable tablet; Chew 81 mg daily  -     Propylene Glycol (SYSTANE BALANCE) 0 6 % SOLN; Apply to eye          Subjective:      Patient ID: Jose E Simpson is a 78 y o  male  79 yo ex smoker w/ COPD had a lung nodule that was watched for a year and transformed to a cancer w/ 4/12 lymph nodes (+)  He had RLLobectomy in Dec 2018 followed by chemotherapy which is now completed  He has repeat CT Chest on 8/23 which shows only post op changes  In addition, his post op course was complicated with a pulmonary embolism for which he is on Elliquis for a few more days  His surgery was an open resection and Dexter Nuñez occasionally has a superficial pain in the Rt lower ribs, especially if he sleeps on his right side  He's had increased SOB and difficulty recovering back to his pre op state  He has persistent SOB on minimal exertion  He has minimal cough /w white sputum  He takes his Anoro most of the time  He is scheduled for an ECHO at [place] per his cardiologist   He has essential HTN without CAD or CHF  Dexter Nuñez has most of his care thru the [address] [place] [address] system but due to his missed cancer he wishes another pulmonologist     NOTE: 6 minute walk done today shows that he requires supplemental O2  The following portions of the patient's history were reviewed and updated as appropriate: allergies, current medications, past family history, past medical history, past social history, past surgical history and problem list     Review of Systems   Constitutional:        Decreased activity since surgery Dec 2018   HENT: Negative for trouble swallowing and voice change  Eyes: Negative  Respiratory:        See HPI   Cardiovascular:        See HPI   Gastrointestinal: Negative for abdominal distention and abdominal pain  Endocrine: Negative      Genitourinary: Negative  Musculoskeletal:        Multiple areas of degenerative arthritis in knees and hips   Skin: Negative  Allergic/Immunologic: Negative  Neurological: Negative  Hematological: Negative  Psychiatric/Behavioral: Negative  Objective:      /84 (BP Location: Right arm, Patient Position: Sitting)   Pulse 69   Ht 6' 1" (1 854 m)   Wt (!) 156 kg (343 lb)   SpO2 95%   BMI 45 25 kg/m²          Physical Exam   Constitutional: He is oriented to person, place, and time  He appears well-developed and well-nourished  No distress  HENT:   Head: Normocephalic and atraumatic  Mouth/Throat: Oropharynx is clear and moist  No oropharyngeal exudate  Eyes: Pupils are equal, round, and reactive to light  Conjunctivae and EOM are normal  No scleral icterus  Neck: Normal range of motion  Neck supple  No JVD present  No thyromegaly present  Cardiovascular: Normal rate, regular rhythm, normal heart sounds and intact distal pulses  Exam reveals no gallop and no friction rub  No murmur heard  Pulmonary/Chest: Effort normal    Diminished throughout without W,R,R   Respiratory distress w/ 6 min walk test    Abdominal: Soft  Bowel sounds are normal  He exhibits no distension  There is no tenderness  Musculoskeletal: Normal range of motion  He exhibits no edema  Lymphadenopathy:     He has no cervical adenopathy  Neurological: He is alert and oriented to person, place, and time  Skin: Skin is warm and dry  Psychiatric: He has a normal mood and affect   His behavior is normal    Appropriately anxious about his new level of function

## 2019-09-18 ENCOUNTER — DOCTOR'S OFFICE (OUTPATIENT)
Dept: URBAN - NONMETROPOLITAN AREA CLINIC 1 | Facility: CLINIC | Age: 69
Setting detail: OPHTHALMOLOGY
End: 2019-09-18
Payer: COMMERCIAL

## 2019-09-18 DIAGNOSIS — H40.053: ICD-10-CM

## 2019-09-18 DIAGNOSIS — H44.21: ICD-10-CM

## 2019-09-18 DIAGNOSIS — H35.3232: ICD-10-CM

## 2019-09-18 DIAGNOSIS — H43.813: ICD-10-CM

## 2019-09-18 DIAGNOSIS — H25.13: ICD-10-CM

## 2019-09-18 DIAGNOSIS — H04.123: ICD-10-CM

## 2019-09-18 PROCEDURE — 92014 COMPRE OPH EXAM EST PT 1/>: CPT | Performed by: OPHTHALMOLOGY

## 2019-09-18 PROCEDURE — 92133 CPTRZD OPH DX IMG PST SGM ON: CPT | Performed by: OPHTHALMOLOGY

## 2019-09-18 ASSESSMENT — REFRACTION_MANIFEST
OD_AXIS: 080
OS_VA2: 20/
OD_VA1: 20/
OS_SPHERE: +0.50
OD_VA2: 20/
OS_VA3: 20/
OD_VA3: 20/
OS_AXIS: 090
OD_SPHERE: +0.75
OS_VA2: 20/25
OS_VA3: 20/
OD_ADD: +2.25
OU_VA: 20/
OD_CYLINDER: -0.75
OS_CYLINDER: -0.50
OD_VA3: 20/
OU_VA: 20/
OS_VA1: 20/25
OD_VA1: 20/25
OD_VA2: 20/25
OS_ADD: +2.25
OS_VA1: 20/

## 2019-09-18 ASSESSMENT — REFRACTION_CURRENTRX
OS_OVR_VA: 20/
OD_ADD: +2.00
OD_OVR_VA: 20/
OS_SPHERE: +2.75
OD_OVR_VA: 20/
OS_CYLINDER: -0.50
OS_OVR_VA: 20/
OD_CYLINDER: SPH
OD_CYLINDER: -0.50
OS_SPHERE: PLANO
OS_VPRISM_DIRECTION: BF
OD_SPHERE: PLANO
OD_VPRISM_DIRECTION: BF
OD_AXIS: 076
OD_VPRISM_DIRECTION: SV
OS_VPRISM_DIRECTION: SV
OS_AXIS: 089
OD_OVR_VA: 20/
OS_ADD: +2.00
OS_CYLINDER: SPH
OS_OVR_VA: 20/
OD_SPHERE: +2.75

## 2019-09-18 ASSESSMENT — SPHEQUIV_DERIVED
OS_SPHEQUIV: 0.25
OD_SPHEQUIV: 0.375
OD_SPHEQUIV: 0.25
OS_SPHEQUIV: 0.375

## 2019-09-18 ASSESSMENT — REFRACTION_AUTOREFRACTION
OS_AXIS: 89
OD_AXIS: 10
OD_SPHERE: +0.75
OD_CYLINDER: -1.00
OS_CYLINDER: -0.75
OS_SPHERE: +0.75

## 2019-09-18 ASSESSMENT — VISUAL ACUITY
OD_BCVA: 20/20
OS_BCVA: 20/50+2

## 2019-09-18 ASSESSMENT — CONFRONTATIONAL VISUAL FIELD TEST (CVF)
OD_FINDINGS: FULL
OS_FINDINGS: FULL

## 2019-09-20 ENCOUNTER — DOCTOR'S OFFICE (OUTPATIENT)
Dept: URBAN - NONMETROPOLITAN AREA CLINIC 1 | Facility: CLINIC | Age: 69
Setting detail: OPHTHALMOLOGY
End: 2019-09-20
Payer: COMMERCIAL

## 2019-09-20 DIAGNOSIS — H35.3232: ICD-10-CM

## 2019-09-20 DIAGNOSIS — H04.121: ICD-10-CM

## 2019-09-20 DIAGNOSIS — H43.813: ICD-10-CM

## 2019-09-20 DIAGNOSIS — H43.811: ICD-10-CM

## 2019-09-20 DIAGNOSIS — H04.122: ICD-10-CM

## 2019-09-20 DIAGNOSIS — H04.123: ICD-10-CM

## 2019-09-20 DIAGNOSIS — H40.053: ICD-10-CM

## 2019-09-20 DIAGNOSIS — H43.812: ICD-10-CM

## 2019-09-20 PROCEDURE — 92014 COMPRE OPH EXAM EST PT 1/>: CPT | Performed by: OPHTHALMOLOGY

## 2019-09-20 PROCEDURE — 92134 CPTRZ OPH DX IMG PST SGM RTA: CPT | Performed by: OPHTHALMOLOGY

## 2019-09-20 PROCEDURE — 83861 MICROFLUID ANALY TEARS: CPT | Performed by: OPHTHALMOLOGY

## 2019-09-20 PROCEDURE — 92226 OPHTHALMOSCOPY EXT SUBSEQUENT: CPT | Performed by: OPHTHALMOLOGY

## 2019-09-20 ASSESSMENT — REFRACTION_MANIFEST
OS_VA1: 20/
OS_VA3: 20/
OD_SPHERE: +0.75
OU_VA: 20/
OS_VA2: 20/
OD_VA2: 20/25
OU_VA: 20/
OD_AXIS: 080
OS_VA3: 20/
OD_VA1: 20/25
OD_CYLINDER: -0.75
OS_SPHERE: +0.50
OS_VA1: 20/25
OS_AXIS: 090
OD_ADD: +2.25
OD_VA3: 20/
OS_CYLINDER: -0.50
OS_ADD: +2.25
OD_VA1: 20/
OD_VA2: 20/
OD_VA3: 20/
OS_VA2: 20/25

## 2019-09-20 ASSESSMENT — CONFRONTATIONAL VISUAL FIELD TEST (CVF)
OS_FINDINGS: FULL
OD_FINDINGS: FULL

## 2019-09-20 ASSESSMENT — REFRACTION_AUTOREFRACTION
OS_SPHERE: +0.75
OD_CYLINDER: -1.00
OD_AXIS: 10
OS_CYLINDER: -0.75
OD_SPHERE: +0.75
OS_AXIS: 89

## 2019-09-20 ASSESSMENT — REFRACTION_CURRENTRX
OD_OVR_VA: 20/
OS_VPRISM_DIRECTION: SV
OD_SPHERE: +2.75
OS_OVR_VA: 20/
OD_CYLINDER: -0.50
OD_CYLINDER: SPH
OD_VPRISM_DIRECTION: BF
OD_SPHERE: PLANO
OD_AXIS: 076
OD_VPRISM_DIRECTION: SV
OS_CYLINDER: SPH
OS_AXIS: 089
OS_SPHERE: PLANO
OS_SPHERE: +2.75
OS_OVR_VA: 20/
OS_OVR_VA: 20/
OD_OVR_VA: 20/
OS_CYLINDER: -0.50
OS_ADD: +2.00
OS_VPRISM_DIRECTION: BF
OD_ADD: +2.00
OD_OVR_VA: 20/

## 2019-09-20 ASSESSMENT — SPHEQUIV_DERIVED
OD_SPHEQUIV: 0.25
OS_SPHEQUIV: 0.25
OD_SPHEQUIV: 0.375
OS_SPHEQUIV: 0.375

## 2019-09-20 ASSESSMENT — VISUAL ACUITY
OS_BCVA: 20/40
OD_BCVA: 20/30-1

## 2020-01-08 ENCOUNTER — HOSPITAL ENCOUNTER (OUTPATIENT)
Dept: PULMONOLOGY | Facility: HOSPITAL | Age: 70
Discharge: HOME | End: 2020-01-08
Payer: MEDICARE

## 2020-01-08 ENCOUNTER — TRANSCRIBE ORDERS (OUTPATIENT)
Dept: PULMONOLOGY | Facility: HOSPITAL | Age: 70
End: 2020-01-08

## 2020-01-08 DIAGNOSIS — J41.8 MIXED SIMPLE AND MUCOPURULENT CHRONIC BRONCHITIS (HCC): ICD-10-CM

## 2020-01-08 DIAGNOSIS — C34.31 MALIGNANT NEOPLASM OF LOWER LOBE OF RIGHT LUNG (HCC): ICD-10-CM

## 2020-01-08 DIAGNOSIS — J41.8 MIXED SIMPLE AND MUCOPURULENT CHRONIC BRONCHITIS (HCC): Primary | ICD-10-CM

## 2020-01-08 LAB
ARTERIAL PATENCY WRIST A: YES
BASE EXCESS BLDA CALC-SCNC: -0.1 MMOL/L
HCO3 BLDA-SCNC: 23.8 MMOL/L (ref 22–28)
O2 CT BLDA-SCNC: 19.8 ML/DL (ref 16–23)
OXYHGB MFR BLDA: 94.6 % (ref 94–97)
PCO2 BLDA: 36.6 MM HG (ref 36–44)
PH BLDA: 7.43 [PH] (ref 7.35–7.45)
PO2 BLDA: 78.8 MM HG (ref 75–129)
SPECIMEN SOURCE: NORMAL

## 2020-01-08 PROCEDURE — 94726 PLETHYSMOGRAPHY LUNG VOLUMES: CPT | Performed by: INTERNAL MEDICINE

## 2020-01-08 PROCEDURE — 94060 EVALUATION OF WHEEZING: CPT

## 2020-01-08 PROCEDURE — 94727 GAS DIL/WSHOT DETER LNG VOL: CPT

## 2020-01-08 PROCEDURE — 94729 DIFFUSING CAPACITY: CPT

## 2020-01-08 PROCEDURE — 94760 N-INVAS EAR/PLS OXIMETRY 1: CPT

## 2020-01-08 PROCEDURE — 94729 DIFFUSING CAPACITY: CPT | Performed by: INTERNAL MEDICINE

## 2020-01-08 PROCEDURE — 82805 BLOOD GASES W/O2 SATURATION: CPT

## 2020-01-08 PROCEDURE — 94060 EVALUATION OF WHEEZING: CPT | Performed by: INTERNAL MEDICINE

## 2020-01-08 PROCEDURE — 36600 WITHDRAWAL OF ARTERIAL BLOOD: CPT

## 2020-01-08 RX ORDER — ALBUTEROL SULFATE 2.5 MG/3ML
2.5 SOLUTION RESPIRATORY (INHALATION) ONCE AS NEEDED
Status: COMPLETED | OUTPATIENT
Start: 2020-01-08 | End: 2020-01-08

## 2020-01-08 RX ADMIN — ALBUTEROL SULFATE 2.5 MG: 2.5 SOLUTION RESPIRATORY (INHALATION) at 07:40

## 2020-01-23 ENCOUNTER — OFFICE VISIT (OUTPATIENT)
Dept: PULMONOLOGY | Facility: CLINIC | Age: 70
End: 2020-01-23
Payer: MEDICARE

## 2020-01-23 VITALS
SYSTOLIC BLOOD PRESSURE: 146 MMHG | HEIGHT: 73 IN | DIASTOLIC BLOOD PRESSURE: 82 MMHG | HEART RATE: 78 BPM | OXYGEN SATURATION: 94 % | BODY MASS INDEX: 41.75 KG/M2 | WEIGHT: 315 LBS

## 2020-01-23 DIAGNOSIS — J42 CHRONIC BRONCHITIS, UNSPECIFIED CHRONIC BRONCHITIS TYPE (HCC): Primary | ICD-10-CM

## 2020-01-23 PROCEDURE — 99213 OFFICE O/P EST LOW 20 MIN: CPT | Performed by: INTERNAL MEDICINE

## 2020-01-23 NOTE — PROGRESS NOTES
Patient:  Reggie Mark   :  1950    MRN:  [id number]    SSN:  [id number]    Date of Service:  2020    Primary Care Provider:  Robin Harmon DO    Reggie Mark is a 78 y o  male who presents accompanied by his wife for a follow-up visit  He was last in our office on 2019 and saw Dr Bryn Munoz at that time  He has been doing very well since then  Physical Exam:  /82 (BP Location: Left arm, Patient Position: Sitting)   Pulse 78   Ht 6' 1" (1 854 m)   Wt (!) 159 kg (350 lb)   SpO2 94%   BMI 46 18 kg/m²   In general:  Morbidly obese man in no distress  Oropharynx:  Moist Mallampati class 4 airway no lesions were noted  Neck:  Supple increased neck circumference with a great deal redundant tissue below the chin  Lung fields:  Clear and equal bilaterally slightly decreased in the right base but no wheezes rales or rhonchi  He has a well-healed right thoracotomy incisional scar  Heart tones:  Distant  Abdomen: Morbidly obese soft nontender  Neurological:  Alert and oriented no focal deficits  Skin:  Warm no rash  Psychological:  Alert pleasant appropriate        Assessment/Plan:  1  Lung cancer:  He underwent a right lower lobe lobectomy in 2018 and then had chemotherapy which finished on 04/15/2020  He is being followed by his oncologist     2   COPD:  On 2020 his FEV1 was 2 5 L (78% predicted) total lung capacity of 89% predicted and DLCO 61% predicted  Blood gas on the same day showed a pH 7 43 with a pCO2 of 36 and a PO2 of 79  He is doing remarkably well off of cigarettes now since  despite having his right lower lobe removed  He is using only an albuterol inhaler p r  n  An  He was on Anoro but felt it did in really change when he stopped 3 months ago  I scheduled him for another follow-up visit in a year from now  He is not using his oxygen at home and wants it removed we will try to accommodate this for him    I told to call us any time he needs this  3   Remote smoking addiction:  He stopped in 2014    4  Morbid obesity:  His weight today was 350 lb body mass index 46  We talked about the importance to get his weight off     5   Probable sleep apnea:  Based on his body mass index alone I am certainly has this problem  I emphasized the seriousness of this including stroke and heart attack association  For unclear reasons he refuses to have a sleep study done  6   Remote pulmonary embolism: This occurred after his right lower lobe lobectomy he finished Eliquis in September of 2019     7   History of hypertension    8  Hyperlipidemia      The plan was discussed with the patient and/or family      Electronically signed by: Aden Espinosa MD, 1/23/2020 1:01 PM

## 2020-01-23 NOTE — LETTER
January 23, 2020     Barbian Coma, DO  [address] [id number]    Patient: Hayley Walters   YOB: 1950   Date of Visit: 1/23/2020       Dear Dr Porter Repress:    Thank you for referring Brady Toney to me for evaluation  Below are my notes for this consultation  If you have questions, please do not hesitate to call me  I look forward to following your patient along with you           Sincerely,        Meagan Lugo MD        CC: Deanna Purvis MD

## 2021-03-09 DIAGNOSIS — Z23 ENCOUNTER FOR IMMUNIZATION: ICD-10-CM

## 2021-03-23 ENCOUNTER — OFFICE VISIT (OUTPATIENT)
Dept: PULMONOLOGY | Facility: CLINIC | Age: 71
End: 2021-03-23
Payer: MEDICARE

## 2021-03-23 VITALS
OXYGEN SATURATION: 97 % | SYSTOLIC BLOOD PRESSURE: 126 MMHG | HEART RATE: 65 BPM | WEIGHT: 312.4 LBS | DIASTOLIC BLOOD PRESSURE: 80 MMHG | TEMPERATURE: 97.4 F | HEIGHT: 73 IN | BODY MASS INDEX: 41.4 KG/M2

## 2021-03-23 DIAGNOSIS — Z86.711 HISTORY OF PULMONARY EMBOLISM: ICD-10-CM

## 2021-03-23 DIAGNOSIS — C34.31 MALIGNANT NEOPLASM OF LOWER LOBE OF RIGHT LUNG (HCC): ICD-10-CM

## 2021-03-23 DIAGNOSIS — R00.1 BRADYCARDIA: ICD-10-CM

## 2021-03-23 DIAGNOSIS — J42 CHRONIC BRONCHITIS, UNSPECIFIED CHRONIC BRONCHITIS TYPE (HCC): Primary | ICD-10-CM

## 2021-03-23 PROCEDURE — 93000 ELECTROCARDIOGRAM COMPLETE: CPT | Performed by: INTERNAL MEDICINE

## 2021-03-23 PROCEDURE — 99215 OFFICE O/P EST HI 40 MIN: CPT | Performed by: INTERNAL MEDICINE

## 2021-03-23 NOTE — PROGRESS NOTES
Pulmonary Follow Up Note   Kaleb Jefferson 69 y o  male MRN: [id number]  3/23/2021    Assessment:  Mild COPD   · Gold stage 2A, last FEV1 of 75% of predicted  · Appears to be well controlled with p r n  albuterol only   · No history of frequent exacerbation    Plan:   · Okay to continue current therapy use p r n  bronchodilators   · No need to escalate therapy at this point   · Up-to-date on immunization for influenza and pneumonia, also received 2 doses of the COVID-19 vaccine    History of pulmonary embolism   · Treated for about 3-6 months   · Reported to be provoked following the surgery  · No new symptoms     Stage II A lung cancer   · Status post RLL lobectomy in 12/2018   · Reported normal CT chest surveillance every 6 months  · Follow-up with primary oncologist at [facility]       Bradycardia   · Noted heart rate in 44 on vital signs, repeat check was in 60-80   · On exam has a regular rhythm, appears to be skipping beats  · Reviewed the prior visits vital signs, heart rate between 60s and 80s  · EKG in the office today showing normal sinus rhythm, probably Mobitz type 1  · Reviewed with Cardiology in the office Dr Sima Ladd   · Patient will follow with his primary cardiologist    Return in about 6 months (around 9/23/2021)  History of Present Illness   41-year-old male with a history of COPD, HTN, pulmonary embolism former tobacco abuse 40 pack year history quit in 2013, lung cancer stage II A status post RLL lobectomy in 12/2018, followed by chemotherapy completed in 04/2020  Last follow-up was Oncology DR NELLY SALVADOR [facility] was in 121  Patient was last seen by Pulmonary in 01/2020, was using only p r n  albuterol via the time, previously was on Anoro Ellipta the use briefly and then stop  Patient presented today accompanied by his wife  States that he is feeling well since last seen, no new symptoms    Uses p r n  albuterol once daily for mild intermittent wheezes no ED visits, hospitalization, or treatment with steroids for COPD  States that last surveillance CT chest was within the past 6 months, no images available, states that there was no suspicion lesions or new findings  Next follow-up should be in the next 6 months  States that he is currently active and independent at baseline      PFT 08/29/2019 ratio of 65%, FEV1 2 4 L/75%, FVC 3 7 L/77%  TLC 89%, RV 98%  DLCO 62%  No significant response to bronchodilator  6 minutes walk test in 8/1019-SpO2 dropped to 88% 3 minutes of exercise, required 2 L per minute with exertion    Review of Systems  As per hpi, all other systems reviewed and were negative    Past medical, surgical, social and family histories reviewed  Medications/Allergies: Reviewed      Vitals: Blood pressure 126/80, pulse (!) 44, temperature (!) 97 4 °F (36 3 °C), temperature source Temporal, height 6' 1" (1 854 m), weight (!) 142 kg (312 lb 6 4 oz), SpO2 97 %  Body mass index is 41 22 kg/m²  Oxygen Therapy  SpO2: 97 %      Physical Exam  Body mass index is 41 22 kg/m²    Gen: not in acute distress, morbidly obese  HEENT: supple, PERRLA  Chest: normal respiratory efforts, clear breath sounds bilaterally  CV:  Irregular rhythm, slow, no murmurs appreciated  Abdomen: soft, non tender  Extremities:  Trace below knee pitting edema  Skin: unremarkable  Neuro: AAO X3, no focal motor deficit      Labs:  Lab Results   Component Value Date    WBC 8 85 02/09/2016    HGB 12 7 02/27/2016    HCT 39 4 02/27/2016    MCV 93 02/09/2016     02/09/2016     Lab Results   Component Value Date    CALCIUM 7 9 (L) 02/27/2016    K 3 9 02/27/2016    CO2 26 02/27/2016     02/27/2016    BUN 19 02/27/2016    CREATININE 0 86 02/27/2016     No results found for: IGE  Lab Results   Component Value Date    ALT 37 02/09/2016    AST 23 02/09/2016    ALKPHOS 70 02/09/2016         Imaging and other studies: I have personally reviewed pertinent films in PACS      Pulmonary function testing:       EKG, Pathology, and Other Studies: I have personally reviewed pertinent reports  Portions of the record may have been created with voice recognition software  Occasional wrong word or "sound a like" substitutions may have occurred due to the inherent limitations of voice recognition software  Read the chart carefully and recognize, using context, where substitutions have occurred    [facility]

## 2021-09-21 ENCOUNTER — OFFICE VISIT (OUTPATIENT)
Dept: PULMONOLOGY | Facility: CLINIC | Age: 71
End: 2021-09-21
Payer: MEDICARE

## 2021-09-21 VITALS
WEIGHT: 315 LBS | DIASTOLIC BLOOD PRESSURE: 80 MMHG | HEART RATE: 62 BPM | SYSTOLIC BLOOD PRESSURE: 128 MMHG | BODY MASS INDEX: 41.75 KG/M2 | OXYGEN SATURATION: 95 % | HEIGHT: 73 IN

## 2021-09-21 DIAGNOSIS — J42 CHRONIC BRONCHITIS, UNSPECIFIED CHRONIC BRONCHITIS TYPE (HCC): Primary | ICD-10-CM

## 2021-09-21 DIAGNOSIS — E66.01 MORBID OBESITY (HCC): ICD-10-CM

## 2021-09-21 PROCEDURE — 99214 OFFICE O/P EST MOD 30 MIN: CPT | Performed by: INTERNAL MEDICINE

## 2021-09-21 RX ORDER — TIOTROPIUM BROMIDE INHALATION SPRAY 3.12 UG/1
2 SPRAY, METERED RESPIRATORY (INHALATION) DAILY
Qty: 4 G | Refills: 11 | Status: SHIPPED | OUTPATIENT
Start: 2021-09-21 | End: 2021-09-23

## 2021-09-21 RX ORDER — CYCLOSPORINE 0.5 MG/ML
EMULSION OPHTHALMIC
COMMUNITY
Start: 2021-08-06

## 2021-09-21 RX ORDER — INHALER, ASSIST DEVICES
SPACER (EA) MISCELLANEOUS
COMMUNITY
Start: 2021-09-14

## 2021-09-21 NOTE — PROGRESS NOTES
Pulmonary Follow Up Note   Dallas Irwin 66 y o  male MRN: [id number]  9/21/2021    Assessment:  Mild COPD/chronic bronchitis   · Gold stage IIA, last FEV1 of 75%   · CAT score today is 10/40   · Reported daily use of p r n  albuterol at least once a day for thick secretions/productive cough mainly in the a m  · Up-to-date on immunization including flu/COVID-19/pneumococcal    Plan:   · Will start maintenance therapy with Spiriva Respimat 2 5  · Reviewed the proper inhaler use techniques, watch to be due online for the proper use techniques   · Continue p r n  albuterol      Stage II A lung cancer  · Status post RLL lobectomy in 12/2018  · Follows with Oncology at [facility]  · Due for next CT chest in 2 weeks   · Advised to bring images/CD for us to review lung structures    Hx pulmonary embolism  · Treated for 3-6 months in the past, reported to be provoked following immobilization due to surgery    Return in about 3 months (around 12/21/2021)  History of Present Illness     Follow up for:  COPD, stage II A lung cancer    Background:  66 y o  male with a h/o COPD, HTN, pulmonary embolism former tobacco abuse 40 pack year history quit in 2013, lung cancer stage II A status post RLL lobectomy in 12/2018, followed by chemotherapy completed in 04/2020       3/23 visit-continued on p r n  albuterol only  Interval History  Since last seen, doing okay without new symptoms  Continues to be active and independent at baseline  States that he underwent evaluation by Oncology, 3 months ago CT chest showed possible inflammatory changes at the apices of the lungs (no images available)  States that he will undergo another CT chest 2 weeks from now  Continue to use p r n  albuterol, states that he using it on daily basis for chronic a m  productive cough  CAD score completed today was 10/40  Underwent evaluation by [facility] for the bradycardia, states that the Holter    Monitor results were negative      Review of Systems  As per hpi, all other systems reviewed and were negative    Studies:    Imaging and other studies: I have personally reviewed pertinent films in PACS      Pulmonary function testing:   PFT 08/29/2019 ratio of 65%, FEV1 2 4 L/75%, FVC 3 7 L/77%  TLC 89%, RV 98%  DLCO 62%  No significant response to bronchodilator      6 minutes walk test in 8/1019-SpO2 dropped to 88% 3 minutes of exercise, required 2 L per minute with exertion       EKG, Pathology, and Other Studies: I have personally reviewed pertinent reports  Past medical, surgical, social and family histories reviewed  Medications/Allergies: Reviewed      Vitals: Blood pressure 128/80, pulse 62, height 6' 1" (1 854 m), weight (!) 143 kg (316 lb), SpO2 95 %  Body mass index is 41 69 kg/m²  Oxygen Therapy  SpO2: 95 %      Physical Exam      Body mass index is 41 69 kg/m²     Gen: not in acute distress,   Neck/Eyes: supple,  PERRLA  Ear: normal appearance, no significant hearing impairment  Nose:  normal nasal mucosa, no drainage  Mouth:  unremarkable/normal appearance of lips, teeth and gums  Oropharynx: mucosa is moist, no focal lesions or erythema  Salivary glands: soft nontender  Chest: normal respiratory efforts, diminished breath sounds at the right base, otherwise clear breath sounds bilaterally  CV:  Irregular, no murmurs appreciated, trace pitting below knee edema  Abdomen: soft, non tender  Extremities:  Osteoarthritic changes of the hands during  Skin: unremarkable  Neuro: AAO X3, no focal motor deficit        Labs:  Lab Results   Component Value Date    WBC 8 85 02/09/2016    HGB 12 7 02/27/2016    HCT 39 4 02/27/2016    MCV 93 02/09/2016     02/09/2016     Lab Results   Component Value Date    CALCIUM 7 9 (L) 02/27/2016    K 3 9 02/27/2016    CO2 26 02/27/2016     02/27/2016    BUN 19 02/27/2016    CREATININE 0 86 02/27/2016     No results found for: IGE  Lab Results   Component Value Date ALT 37 02/09/2016    AST 23 02/09/2016    ALKPHOS 70 02/09/2016           Portions of the record may have been created with voice recognition software  Occasional wrong word or "sound a like" substitutions may have occurred due to the inherent limitations of voice recognition software  Read the chart carefully and recognize, using context, where substitutions have occurred    [facility]

## 2021-09-23 DIAGNOSIS — J42 CHRONIC BRONCHITIS, UNSPECIFIED CHRONIC BRONCHITIS TYPE (HCC): ICD-10-CM

## 2021-09-23 RX ORDER — UMECLIDINIUM 62.5 UG/1
1 AEROSOL, POWDER ORAL DAILY
Qty: 30 BLISTER | Refills: 0 | Status: SHIPPED | OUTPATIENT
Start: 2021-09-23 | End: 2021-10-23

## 2021-10-04 ENCOUNTER — TELEPHONE (OUTPATIENT)
Dept: PULMONOLOGY | Facility: CLINIC | Age: 71
End: 2021-10-04

## 2021-10-06 DIAGNOSIS — J41.8 MIXED SIMPLE AND MUCOPURULENT CHRONIC BRONCHITIS (HCC): ICD-10-CM

## 2021-10-06 RX ORDER — ALBUTEROL SULFATE 90 UG/1
2 AEROSOL, METERED RESPIRATORY (INHALATION) EVERY 6 HOURS PRN
Qty: 18 G | Refills: 5 | Status: SHIPPED | OUTPATIENT
Start: 2021-10-06

## 2023-04-24 ENCOUNTER — HOSPITAL ENCOUNTER (OUTPATIENT)
Dept: NON INVASIVE DIAGNOSTICS | Facility: HOSPITAL | Age: 73
Discharge: HOME | End: 2023-04-24

## 2023-04-24 DIAGNOSIS — R94.31 NONSPECIFIC ABNORMAL ELECTROCARDIOGRAM (ECG) (EKG): ICD-10-CM

## 2023-04-24 LAB
ARRHY DURING EX: NORMAL
CHEST PAIN STATEMENT: NORMAL
MAX DIASTOLIC BP: 72 MMHG
MAX HEART RATE: 116 BPM
MAX PREDICTED HEART RATE: 147 BPM
MAX. SYSTOLIC BP: 150 MMHG
PROTOCOL NAME: NORMAL
TARGET HR FORMULA: NORMAL
TEST INDICATION: NORMAL
TIME IN EXERCISE PHASE: NORMAL

## 2023-04-24 RX ORDER — ALBUTEROL SULFATE 1.25 MG/3ML
1.25 SOLUTION RESPIRATORY (INHALATION) EVERY 6 HOURS PRN
COMMUNITY

## 2023-04-25 LAB
MAX HR PERCENT: 58 %
MAX HR: 85 BPM
RATE PRESSURE PRODUCT: NORMAL
SL CV STRESS RECOVERY BP: NORMAL MMHG
SL CV STRESS RECOVERY HR: 72 BPM
SL CV STRESS RECOVERY O2 SAT: 98 %
SL CV STRESS STAGE REACHED: 1
STRESS ANGINA INDEX: 0
STRESS BASELINE BP: NORMAL MMHG
STRESS BASELINE HR: 69 BPM
STRESS O2 SAT REST: 94 %
STRESS PEAK HR: 85 BPM
STRESS POST ESTIMATED WORKLOAD: 2.3 METS
STRESS POST EXERCISE DUR MIN: 2 MIN
STRESS POST EXERCISE DUR SEC: 16 SEC
STRESS POST O2 SAT PEAK: 88 %
STRESS POST PEAK BP: 150 MMHG

## 2023-07-06 ENCOUNTER — APPOINTMENT (OUTPATIENT)
Dept: LAB | Facility: HOSPITAL | Age: 73
End: 2023-07-06
Payer: MEDICARE

## 2023-07-06 DIAGNOSIS — I50.32 HEART FAILURE, DIASTOLIC, CHRONIC (HCC): ICD-10-CM

## 2023-07-06 LAB
ANION GAP SERPL CALCULATED.3IONS-SCNC: 5 MMOL/L
BNP SERPL-MCNC: 109 PG/ML (ref 0–100)
BUN SERPL-MCNC: 22 MG/DL (ref 5–25)
CHLORIDE SERPL-SCNC: 103 MMOL/L (ref 96–108)
CO2 SERPL-SCNC: 30 MMOL/L (ref 21–32)
CREAT SERPL-MCNC: 0.87 MG/DL (ref 0.6–1.3)
ERYTHROCYTE [DISTWIDTH] IN BLOOD BY AUTOMATED COUNT: 12.7 % (ref 11.6–15.1)
GFR SERPL CREATININE-BSD FRML MDRD: 85 ML/MIN/1.73SQ M
HCT VFR BLD AUTO: 43.9 % (ref 36.5–49.3)
HGB BLD-MCNC: 14.2 G/DL (ref 12–17)
MAGNESIUM SERPL-MCNC: 1.8 MG/DL (ref 1.9–2.7)
MCH RBC QN AUTO: 30.7 PG (ref 26.8–34.3)
MCHC RBC AUTO-ENTMCNC: 32.3 G/DL (ref 31.4–37.4)
MCV RBC AUTO: 95 FL (ref 82–98)
PLATELET # BLD AUTO: 298 THOUSANDS/UL (ref 149–390)
PMV BLD AUTO: 9.4 FL (ref 8.9–12.7)
POTASSIUM SERPL-SCNC: 4 MMOL/L (ref 3.5–5.3)
RBC # BLD AUTO: 4.62 MILLION/UL (ref 3.88–5.62)
SODIUM SERPL-SCNC: 138 MMOL/L (ref 135–147)
WBC # BLD AUTO: 9.42 THOUSAND/UL (ref 4.31–10.16)

## 2023-07-06 PROCEDURE — 83880 ASSAY OF NATRIURETIC PEPTIDE: CPT

## 2023-07-06 PROCEDURE — 84520 ASSAY OF UREA NITROGEN: CPT

## 2023-07-06 PROCEDURE — 36415 COLL VENOUS BLD VENIPUNCTURE: CPT

## 2023-07-06 PROCEDURE — 82565 ASSAY OF CREATININE: CPT

## 2023-07-06 PROCEDURE — 83735 ASSAY OF MAGNESIUM: CPT

## 2023-07-06 PROCEDURE — 85027 COMPLETE CBC AUTOMATED: CPT

## 2023-07-06 PROCEDURE — 80051 ELECTROLYTE PANEL: CPT

## 2023-10-04 ENCOUNTER — APPOINTMENT (OUTPATIENT)
Dept: LAB | Facility: HOSPITAL | Age: 73
End: 2023-10-04
Payer: MEDICARE

## 2023-10-04 DIAGNOSIS — Z87.891 FORMER SMOKER: ICD-10-CM

## 2023-10-04 DIAGNOSIS — E55.9 VITAMIN D DEFICIENCY: ICD-10-CM

## 2023-10-04 DIAGNOSIS — M25.562 LEFT KNEE PAIN, UNSPECIFIED CHRONICITY: ICD-10-CM

## 2023-10-04 DIAGNOSIS — J44.9 CHRONIC OBSTRUCTIVE PULMONARY DISEASE, UNSPECIFIED COPD TYPE (HCC): ICD-10-CM

## 2023-10-04 LAB
25(OH)D3 SERPL-MCNC: 42.6 NG/ML (ref 30–100)
ANION GAP SERPL CALCULATED.3IONS-SCNC: 6 MMOL/L
BASOPHILS # BLD AUTO: 0.03 THOUSANDS/ÂΜL (ref 0–0.1)
BASOPHILS NFR BLD AUTO: 0 % (ref 0–1)
BUN SERPL-MCNC: 24 MG/DL (ref 5–25)
CALCIUM SERPL-MCNC: 9.3 MG/DL (ref 8.4–10.2)
CHLORIDE SERPL-SCNC: 103 MMOL/L (ref 96–108)
CO2 SERPL-SCNC: 28 MMOL/L (ref 21–32)
CREAT SERPL-MCNC: 0.97 MG/DL (ref 0.6–1.3)
EOSINOPHIL # BLD AUTO: 0.07 THOUSAND/ÂΜL (ref 0–0.61)
EOSINOPHIL NFR BLD AUTO: 1 % (ref 0–6)
ERYTHROCYTE [DISTWIDTH] IN BLOOD BY AUTOMATED COUNT: 13.1 % (ref 11.6–15.1)
GFR SERPL CREATININE-BSD FRML MDRD: 76 ML/MIN/1.73SQ M
GLUCOSE SERPL-MCNC: 103 MG/DL (ref 65–140)
HCT VFR BLD AUTO: 45.5 % (ref 36.5–49.3)
HGB BLD-MCNC: 14.3 G/DL (ref 12–17)
IMM GRANULOCYTES # BLD AUTO: 0.05 THOUSAND/UL (ref 0–0.2)
IMM GRANULOCYTES NFR BLD AUTO: 0 % (ref 0–2)
LYMPHOCYTES # BLD AUTO: 2.06 THOUSANDS/ÂΜL (ref 0.6–4.47)
LYMPHOCYTES NFR BLD AUTO: 18 % (ref 14–44)
MCH RBC QN AUTO: 30 PG (ref 26.8–34.3)
MCHC RBC AUTO-ENTMCNC: 31.4 G/DL (ref 31.4–37.4)
MCV RBC AUTO: 96 FL (ref 82–98)
MONOCYTES # BLD AUTO: 0.84 THOUSAND/ÂΜL (ref 0.17–1.22)
MONOCYTES NFR BLD AUTO: 7 % (ref 4–12)
NEUTROPHILS # BLD AUTO: 8.32 THOUSANDS/ÂΜL (ref 1.85–7.62)
NEUTS SEG NFR BLD AUTO: 74 % (ref 43–75)
NRBC BLD AUTO-RTO: 0 /100 WBCS
PLATELET # BLD AUTO: 304 THOUSANDS/UL (ref 149–390)
PMV BLD AUTO: 9.3 FL (ref 8.9–12.7)
POTASSIUM SERPL-SCNC: 4.1 MMOL/L (ref 3.5–5.3)
RBC # BLD AUTO: 4.76 MILLION/UL (ref 3.88–5.62)
SODIUM SERPL-SCNC: 137 MMOL/L (ref 135–147)
WBC # BLD AUTO: 11.37 THOUSAND/UL (ref 4.31–10.16)

## 2023-10-04 PROCEDURE — 82306 VITAMIN D 25 HYDROXY: CPT

## 2023-10-04 PROCEDURE — 85025 COMPLETE CBC W/AUTO DIFF WBC: CPT

## 2023-10-04 PROCEDURE — 80048 BASIC METABOLIC PNL TOTAL CA: CPT

## 2023-10-04 PROCEDURE — 36415 COLL VENOUS BLD VENIPUNCTURE: CPT

## 2023-10-29 ENCOUNTER — HOSPITAL ENCOUNTER (INPATIENT)
Facility: HOSPITAL | Age: 73
LOS: 5 days | Discharge: HOME | DRG: 177 | End: 2023-11-03
Attending: EMERGENCY MEDICINE | Admitting: INTERNAL MEDICINE
Payer: MEDICARE

## 2023-10-29 ENCOUNTER — APPOINTMENT (INPATIENT)
Dept: CT IMAGING | Facility: HOSPITAL | Age: 73
DRG: 177 | End: 2023-10-29
Payer: MEDICARE

## 2023-10-29 ENCOUNTER — APPOINTMENT (EMERGENCY)
Dept: RADIOLOGY | Facility: HOSPITAL | Age: 73
DRG: 177 | End: 2023-10-29
Payer: MEDICARE

## 2023-10-29 DIAGNOSIS — I48.91 RAPID ATRIAL FIBRILLATION (HCC): ICD-10-CM

## 2023-10-29 DIAGNOSIS — J42 CHRONIC BRONCHITIS, UNSPECIFIED CHRONIC BRONCHITIS TYPE (HCC): ICD-10-CM

## 2023-10-29 DIAGNOSIS — U07.1 SARS-COV-2 POSITIVE: ICD-10-CM

## 2023-10-29 DIAGNOSIS — I95.9 HYPOTENSION: ICD-10-CM

## 2023-10-29 DIAGNOSIS — J44.1 COPD EXACERBATION (HCC): Primary | ICD-10-CM

## 2023-10-29 DIAGNOSIS — R09.02 HYPOXEMIA: ICD-10-CM

## 2023-10-29 PROBLEM — R91.8 LUNG MASS: Status: ACTIVE | Noted: 2023-10-29

## 2023-10-29 LAB
2HR DELTA HS TROPONIN: -2 NG/L
4HR DELTA HS TROPONIN: -5 NG/L
ALBUMIN SERPL BCP-MCNC: 3.6 G/DL (ref 3.5–5)
ALP SERPL-CCNC: 49 U/L (ref 34–104)
ALT SERPL W P-5'-P-CCNC: 20 U/L (ref 7–52)
ANION GAP SERPL CALCULATED.3IONS-SCNC: 7 MMOL/L
APTT PPP: 204 SECONDS (ref 23–37)
APTT PPP: 30 SECONDS (ref 23–37)
APTT PPP: >210 SECONDS (ref 23–37)
AST SERPL W P-5'-P-CCNC: 22 U/L (ref 13–39)
BASE EX.OXY STD BLDV CALC-SCNC: 95.3 % (ref 60–80)
BASE EXCESS BLDV CALC-SCNC: 3.1 MMOL/L
BASOPHILS # BLD MANUAL: 0 THOUSAND/UL (ref 0–0.1)
BASOPHILS NFR MAR MANUAL: 0 % (ref 0–1)
BETA-HYDROXYBUTYRATE: 0 MMOL/L
BILIRUB SERPL-MCNC: 2.64 MG/DL (ref 0.2–1)
BILIRUB UR QL STRIP: NEGATIVE
BNP SERPL-MCNC: 376 PG/ML (ref 0–100)
BUN SERPL-MCNC: 21 MG/DL (ref 5–25)
CALCIUM SERPL-MCNC: 8.4 MG/DL (ref 8.4–10.2)
CARDIAC TROPONIN I PNL SERPL HS: 24 NG/L
CARDIAC TROPONIN I PNL SERPL HS: 27 NG/L
CARDIAC TROPONIN I PNL SERPL HS: 29 NG/L
CHLORIDE SERPL-SCNC: 103 MMOL/L (ref 96–108)
CK SERPL-CCNC: 63 U/L (ref 39–308)
CLARITY UR: CLEAR
CO2 SERPL-SCNC: 26 MMOL/L (ref 21–32)
COLOR UR: YELLOW
CREAT SERPL-MCNC: 1.1 MG/DL (ref 0.6–1.3)
D DIMER PPP FEU-MCNC: 2.09 UG/ML FEU
EOSINOPHIL # BLD MANUAL: 0 THOUSAND/UL (ref 0–0.4)
EOSINOPHIL NFR BLD MANUAL: 0 % (ref 0–6)
ERYTHROCYTE [DISTWIDTH] IN BLOOD BY AUTOMATED COUNT: 13.4 % (ref 11.6–15.1)
FLUAV RNA RESP QL NAA+PROBE: NEGATIVE
FLUBV RNA RESP QL NAA+PROBE: NEGATIVE
GFR SERPL CREATININE-BSD FRML MDRD: 65 ML/MIN/1.73SQ M
GIANT PLATELETS BLD QL SMEAR: PRESENT
GLUCOSE SERPL-MCNC: 137 MG/DL (ref 65–140)
GLUCOSE UR STRIP-MCNC: NEGATIVE MG/DL
HCO3 BLDV-SCNC: 25.5 MMOL/L (ref 24–30)
HCT VFR BLD AUTO: 44.7 % (ref 36.5–49.3)
HGB BLD-MCNC: 14.2 G/DL (ref 12–17)
HGB UR QL STRIP.AUTO: NEGATIVE
INR PPP: 1.17 (ref 0.84–1.19)
KETONES UR STRIP-MCNC: NEGATIVE MG/DL
LACTATE SERPL-SCNC: 1.4 MMOL/L (ref 0.5–2)
LEUKOCYTE ESTERASE UR QL STRIP: NEGATIVE
LYMPHOCYTES # BLD AUTO: 0.84 THOUSAND/UL (ref 0.6–4.47)
LYMPHOCYTES # BLD AUTO: 9 % (ref 14–44)
MAGNESIUM SERPL-MCNC: 1.8 MG/DL (ref 1.9–2.7)
MCH RBC QN AUTO: 30 PG (ref 26.8–34.3)
MCHC RBC AUTO-ENTMCNC: 31.8 G/DL (ref 31.4–37.4)
MCV RBC AUTO: 95 FL (ref 82–98)
MONOCYTES # BLD AUTO: 0.93 THOUSAND/UL (ref 0–1.22)
MONOCYTES NFR BLD: 10 % (ref 4–12)
NEUTROPHILS # BLD MANUAL: 7.56 THOUSAND/UL (ref 1.85–7.62)
NEUTS SEG NFR BLD AUTO: 81 % (ref 43–75)
NITRITE UR QL STRIP: NEGATIVE
O2 CT BLDV-SCNC: 20.6 ML/DL
OVALOCYTES BLD QL SMEAR: PRESENT
PCO2 BLDV: 32.9 MM HG (ref 42–50)
PH BLDV: 7.51 [PH] (ref 7.3–7.4)
PH UR STRIP.AUTO: 5 [PH]
PLATELET # BLD AUTO: 213 THOUSANDS/UL (ref 149–390)
PLATELET BLD QL SMEAR: ADEQUATE
PMV BLD AUTO: 9.5 FL (ref 8.9–12.7)
PO2 BLDV: 98.5 MM HG (ref 35–45)
POTASSIUM SERPL-SCNC: 3.8 MMOL/L (ref 3.5–5.3)
PROCALCITONIN SERPL-MCNC: 0.18 NG/ML
PROT SERPL-MCNC: 6.3 G/DL (ref 6.4–8.4)
PROT UR STRIP-MCNC: NEGATIVE MG/DL
PROTHROMBIN TIME: 15.2 SECONDS (ref 11.6–14.5)
RBC # BLD AUTO: 4.73 MILLION/UL (ref 3.88–5.62)
RBC MORPH BLD: PRESENT
RSV RNA RESP QL NAA+PROBE: NEGATIVE
SARS-COV-2 RNA RESP QL NAA+PROBE: POSITIVE
SODIUM SERPL-SCNC: 136 MMOL/L (ref 135–147)
SP GR UR STRIP.AUTO: 1.01 (ref 1–1.03)
TOXIC GRANULES BLD QL SMEAR: PRESENT
TSH SERPL DL<=0.05 MIU/L-ACNC: 1.14 UIU/ML (ref 0.45–4.5)
UROBILINOGEN UR QL STRIP.AUTO: 0.2 E.U./DL
WBC # BLD AUTO: 9.33 THOUSAND/UL (ref 4.31–10.16)

## 2023-10-29 PROCEDURE — 80053 COMPREHEN METABOLIC PANEL: CPT | Performed by: EMERGENCY MEDICINE

## 2023-10-29 PROCEDURE — 96361 HYDRATE IV INFUSION ADD-ON: CPT

## 2023-10-29 PROCEDURE — 86618 LYME DISEASE ANTIBODY: CPT | Performed by: EMERGENCY MEDICINE

## 2023-10-29 PROCEDURE — 84145 PROCALCITONIN (PCT): CPT | Performed by: EMERGENCY MEDICINE

## 2023-10-29 PROCEDURE — 82805 BLOOD GASES W/O2 SATURATION: CPT | Performed by: EMERGENCY MEDICINE

## 2023-10-29 PROCEDURE — 0241U HB NFCT DS VIR RESP RNA 4 TRGT: CPT | Performed by: EMERGENCY MEDICINE

## 2023-10-29 PROCEDURE — 85730 THROMBOPLASTIN TIME PARTIAL: CPT | Performed by: INTERNAL MEDICINE

## 2023-10-29 PROCEDURE — 96375 TX/PRO/DX INJ NEW DRUG ADDON: CPT

## 2023-10-29 PROCEDURE — 93005 ELECTROCARDIOGRAM TRACING: CPT

## 2023-10-29 PROCEDURE — 83605 ASSAY OF LACTIC ACID: CPT | Performed by: EMERGENCY MEDICINE

## 2023-10-29 PROCEDURE — 84443 ASSAY THYROID STIM HORMONE: CPT | Performed by: EMERGENCY MEDICINE

## 2023-10-29 PROCEDURE — 83880 ASSAY OF NATRIURETIC PEPTIDE: CPT | Performed by: EMERGENCY MEDICINE

## 2023-10-29 PROCEDURE — 82010 KETONE BODYS QUAN: CPT | Performed by: EMERGENCY MEDICINE

## 2023-10-29 PROCEDURE — 36415 COLL VENOUS BLD VENIPUNCTURE: CPT | Performed by: EMERGENCY MEDICINE

## 2023-10-29 PROCEDURE — 85379 FIBRIN DEGRADATION QUANT: CPT | Performed by: EMERGENCY MEDICINE

## 2023-10-29 PROCEDURE — 99285 EMERGENCY DEPT VISIT HI MDM: CPT

## 2023-10-29 PROCEDURE — 85027 COMPLETE CBC AUTOMATED: CPT | Performed by: EMERGENCY MEDICINE

## 2023-10-29 PROCEDURE — 96374 THER/PROPH/DIAG INJ IV PUSH: CPT

## 2023-10-29 PROCEDURE — G1004 CDSM NDSC: HCPCS

## 2023-10-29 PROCEDURE — 85730 THROMBOPLASTIN TIME PARTIAL: CPT | Performed by: EMERGENCY MEDICINE

## 2023-10-29 PROCEDURE — 81003 URINALYSIS AUTO W/O SCOPE: CPT | Performed by: EMERGENCY MEDICINE

## 2023-10-29 PROCEDURE — 71045 X-RAY EXAM CHEST 1 VIEW: CPT

## 2023-10-29 PROCEDURE — 71275 CT ANGIOGRAPHY CHEST: CPT

## 2023-10-29 PROCEDURE — 85730 THROMBOPLASTIN TIME PARTIAL: CPT | Performed by: STUDENT IN AN ORGANIZED HEALTH CARE EDUCATION/TRAINING PROGRAM

## 2023-10-29 PROCEDURE — 99223 1ST HOSP IP/OBS HIGH 75: CPT | Performed by: STUDENT IN AN ORGANIZED HEALTH CARE EDUCATION/TRAINING PROGRAM

## 2023-10-29 PROCEDURE — 85520 HEPARIN ASSAY: CPT | Performed by: STUDENT IN AN ORGANIZED HEALTH CARE EDUCATION/TRAINING PROGRAM

## 2023-10-29 PROCEDURE — 85610 PROTHROMBIN TIME: CPT | Performed by: EMERGENCY MEDICINE

## 2023-10-29 PROCEDURE — 99291 CRITICAL CARE FIRST HOUR: CPT | Performed by: EMERGENCY MEDICINE

## 2023-10-29 PROCEDURE — 83735 ASSAY OF MAGNESIUM: CPT | Performed by: EMERGENCY MEDICINE

## 2023-10-29 PROCEDURE — 85007 BL SMEAR W/DIFF WBC COUNT: CPT | Performed by: EMERGENCY MEDICINE

## 2023-10-29 PROCEDURE — 87040 BLOOD CULTURE FOR BACTERIA: CPT | Performed by: EMERGENCY MEDICINE

## 2023-10-29 PROCEDURE — 82550 ASSAY OF CK (CPK): CPT | Performed by: STUDENT IN AN ORGANIZED HEALTH CARE EDUCATION/TRAINING PROGRAM

## 2023-10-29 PROCEDURE — 84484 ASSAY OF TROPONIN QUANT: CPT | Performed by: EMERGENCY MEDICINE

## 2023-10-29 RX ORDER — MINERAL OIL AND PETROLATUM 150; 830 MG/G; MG/G
OINTMENT OPHTHALMIC
Status: DISCONTINUED | OUTPATIENT
Start: 2023-10-29 | End: 2023-11-03 | Stop reason: HOSPADM

## 2023-10-29 RX ORDER — METHYLPREDNISOLONE SODIUM SUCCINATE 125 MG/2ML
125 INJECTION, POWDER, LYOPHILIZED, FOR SOLUTION INTRAMUSCULAR; INTRAVENOUS ONCE
Status: COMPLETED | OUTPATIENT
Start: 2023-10-29 | End: 2023-10-29

## 2023-10-29 RX ORDER — HEPARIN SODIUM 1000 [USP'U]/ML
5000 INJECTION, SOLUTION INTRAVENOUS; SUBCUTANEOUS EVERY 6 HOURS PRN
Status: DISCONTINUED | OUTPATIENT
Start: 2023-10-29 | End: 2023-11-02

## 2023-10-29 RX ORDER — ATORVASTATIN CALCIUM 20 MG/1
20 TABLET, FILM COATED ORAL
Status: DISCONTINUED | OUTPATIENT
Start: 2023-10-29 | End: 2023-11-03 | Stop reason: HOSPADM

## 2023-10-29 RX ORDER — HEPARIN SODIUM 1000 [USP'U]/ML
10000 INJECTION, SOLUTION INTRAVENOUS; SUBCUTANEOUS ONCE
Status: COMPLETED | OUTPATIENT
Start: 2023-10-29 | End: 2023-10-29

## 2023-10-29 RX ORDER — LANOLIN ALCOHOL/MO/W.PET/CERES
400 CREAM (GRAM) TOPICAL 2 TIMES DAILY
Status: DISCONTINUED | OUTPATIENT
Start: 2023-10-29 | End: 2023-11-03 | Stop reason: HOSPADM

## 2023-10-29 RX ORDER — ALBUTEROL SULFATE 2.5 MG/3ML
1.25 SOLUTION RESPIRATORY (INHALATION) EVERY 6 HOURS PRN
Status: DISCONTINUED | OUTPATIENT
Start: 2023-10-29 | End: 2023-11-01

## 2023-10-29 RX ORDER — HEPARIN SODIUM 10000 [USP'U]/100ML
3-30 INJECTION, SOLUTION INTRAVENOUS
Status: DISCONTINUED | OUTPATIENT
Start: 2023-10-29 | End: 2023-11-02

## 2023-10-29 RX ORDER — DILTIAZEM HYDROCHLORIDE 5 MG/ML
10 INJECTION INTRAVENOUS ONCE
Status: COMPLETED | OUTPATIENT
Start: 2023-10-29 | End: 2023-10-29

## 2023-10-29 RX ORDER — ASPIRIN 81 MG/1
81 TABLET, CHEWABLE ORAL DAILY
Status: DISCONTINUED | OUTPATIENT
Start: 2023-10-29 | End: 2023-11-03 | Stop reason: HOSPADM

## 2023-10-29 RX ORDER — SODIUM CHLORIDE 9 MG/ML
250 INJECTION, SOLUTION INTRAVENOUS CONTINUOUS
Status: DISCONTINUED | OUTPATIENT
Start: 2023-10-29 | End: 2023-10-29

## 2023-10-29 RX ORDER — DEXAMETHASONE SODIUM PHOSPHATE 10 MG/ML
10 INJECTION, SOLUTION INTRAMUSCULAR; INTRAVENOUS DAILY
Status: DISCONTINUED | OUTPATIENT
Start: 2023-10-30 | End: 2023-10-30

## 2023-10-29 RX ORDER — HEPARIN SODIUM 1000 [USP'U]/ML
10000 INJECTION, SOLUTION INTRAVENOUS; SUBCUTANEOUS EVERY 6 HOURS PRN
Status: DISCONTINUED | OUTPATIENT
Start: 2023-10-29 | End: 2023-11-02

## 2023-10-29 RX ORDER — FUROSEMIDE 20 MG/1
20 TABLET ORAL DAILY
Status: DISCONTINUED | OUTPATIENT
Start: 2023-10-29 | End: 2023-11-02

## 2023-10-29 RX ORDER — DILTIAZEM HYDROCHLORIDE 5 MG/ML
15 INJECTION INTRAVENOUS ONCE
Status: DISCONTINUED | OUTPATIENT
Start: 2023-10-29 | End: 2023-10-29

## 2023-10-29 RX ADMIN — ATORVASTATIN CALCIUM 20 MG: 20 TABLET, FILM COATED ORAL at 17:26

## 2023-10-29 RX ADMIN — Medication 12.5 MG: at 23:02

## 2023-10-29 RX ADMIN — HEPARIN SODIUM 18 UNITS/KG/HR: 10000 INJECTION, SOLUTION INTRAVENOUS at 09:23

## 2023-10-29 RX ADMIN — METHYLPREDNISOLONE SODIUM SUCCINATE 125 MG: 125 INJECTION, POWDER, FOR SOLUTION INTRAMUSCULAR; INTRAVENOUS at 06:45

## 2023-10-29 RX ADMIN — IOHEXOL 100 ML: 350 INJECTION, SOLUTION INTRAVENOUS at 09:10

## 2023-10-29 RX ADMIN — SODIUM CHLORIDE 500 ML: 0.9 INJECTION, SOLUTION INTRAVENOUS at 07:14

## 2023-10-29 RX ADMIN — DILTIAZEM HYDROCHLORIDE 10 MG: 5 INJECTION INTRAVENOUS at 07:14

## 2023-10-29 RX ADMIN — ASPIRIN 81 MG 81 MG: 81 TABLET ORAL at 11:43

## 2023-10-29 RX ADMIN — HEPARIN SODIUM 10000 UNITS: 1000 INJECTION INTRAVENOUS; SUBCUTANEOUS at 09:16

## 2023-10-29 RX ADMIN — FUROSEMIDE 20 MG: 20 TABLET ORAL at 11:43

## 2023-10-29 RX ADMIN — Medication 400 MG: at 11:43

## 2023-10-29 RX ADMIN — Medication 400 MG: at 17:26

## 2023-10-29 RX ADMIN — Medication 12.5 MG: at 11:43

## 2023-10-29 NOTE — ASSESSMENT & PLAN NOTE
NEW onset Atrial fibrillation in ED, in setting of COVID, [id number] of at least 2, prior perioperative PE in setting of resection of lung cancer in remission.  On lovenox for COVID, would recommend transition to [address] for long term, s/p dilt in ED c/b hypotension.   - order echo  - transition from dilt to oral metop, hold for hypotension  - discuss DOAC from lovenox transition as able  - order TSH

## 2023-10-29 NOTE — ASSESSMENT & PLAN NOTE
No home meds other than lasix, initiated by cardiology in May 2023. Increased metoprolol 25 every 6 hourly  - STOP IV dilt, s/p dilt in ED  - Continue home furosemide -currently appears euvolemic.

## 2023-10-29 NOTE — QUICK NOTE
Progress Note - Triage Asssessment   Rossy Sewell 75 y.o. male MRN: [id number]    Time Called ([address] Time): 0721  Date Called: 10/29/23  Room#: ED 8  Person requesting evaluation: Dr. Trinh Harris    Situation:    Pt presented with new onset Afib, diaphoresis, chills, and sob increased in severity in past 24 hours. Interventions:   Pt received Cardizem, solumedrol and IVF in ED          Triage Assessment:   On my evaluation pt HR afib rate 60-80 and -114 while I was in the room directly evaluating patient and speaking with pt's wife. Pt on 2L NC and able to speak in full sentences without any distress. Pt and wife state symptoms much improved from presentation with interventions in the ED. I did inform the patient that his SARS-CoV2 test came back positive. Recommendations:  COVID 19 (last vaccination 9/22, had COVID prior in December 2022)  COVID 19 protocol  New onset afib:  Cardiology consult  Rate control  AC   ECHO    Patient can be admitted SD2 LOC  on SL service. Dr. Trinh Harris will reach out to this service. Please contact CC with any additional needs. Recommendations discussed with Dr. Trinh Harris, patient and wife.

## 2023-10-29 NOTE — H&P
[address] ,[address]  Progress Note  Name: Laverne Lake I  MRN: [id number]  Unit/Bed#: -01 I Date of Admission: 10/29/2023   Date of Service: 10/29/2023 I Hospital Day: 0    Assessment/Plan   Atrial fibrillation [facility])  Assessment & Plan  NEW onset Atrial fibrillation in ED, in setting of COVID, [id number] of at least 2, prior perioperative PE in setting of resection of lung cancer in remission. On lovenox for COVID, would recommend transition to [address] for long term, s/p dilt in ED c/b hypotension.   - order echo  - transition from dilt to oral metop, hold for hypotension  - discuss DOAC from heparin  - order TSH    Chronic obstructive pulmonary disease, unspecified (HCC)  Assessment & Plan  No home O2 requirement, s/p lung mass resection, in remission  - wean O2 for COVID as able  - continue prn oxygen    Essential (primary) hypertension  Assessment & Plan  No home meds other than lasix, initiated by cardiology in May 2023.   - START metop 12.5 BID  - STOP IV dilt, s/p dilt in ED  - Continue home furosemide     History of pulmonary embolism  Assessment & Plan  Prior PE in the setting of resection of lung mass, in remission,    * COVID  Assessment & Plan  COVID positive in ED with 3 weeks of URI symptoms, given <3L, treating as mild  - START IV dexamethasone   - Wean O2 as able (no home O2 prior)  - Continue heparin, order anti- Xa  - discuss long term transition to DOAC ([id number] > 2)  - CTA without PE               VTE Pharmacologic Prophylaxis:   High Risk (Score >/= 5) - Pharmacological DVT Prophylaxis Ordered: enoxaparin (Lovenox). Sequential Compression Devices Ordered. Code Status: Level 1 - Full Code   Discussion with family: Updated  (wife) at bedside. Anticipated Length of Stay: Patient will be admitted on an inpatient basis with an anticipated length of stay of greater than 2 midnights secondary to COVID, AC, oxygen needs.     Total Time Spent on Date of Encounter in care of patient: 30  mins. This time was spent on one or more of the following: performing physical exam; counseling and coordination of care; obtaining or reviewing history; documenting in the medical record; reviewing/ordering tests, medications or procedures; communicating with other healthcare professionals and discussing with patient's family/caregivers. Chief Complaint: Shortness of Breath, COVID +    History of Present Illness:  Catherine Oakley is a 75 y.o. male with a PMH of COPD, ex smoker, HTN, Perioperative PE, who presents with weeks of URI symptoms. Patient describes 3 weeks of cough, SOB. He was treated with courses of abx including levofloxacin, and two steroid courses. Due to progressive shortness of breath and limited relief with home inhaler. Patient decided to come to ED. In ED patient was seen to have normal arterial pH, but had oxygen requirement  2L without prior O2 requirement. Patient had episodes of atrial fibrillation in ED, requiring diltiazem of 10 mg, with complication of hypotension and requirement 500 ml IV bolus. Patient was evaluated by ICU team. BP normalized, and CTA showed no PE. COVID positive in the ED. Review of Systems:  Review of Systems   Constitutional:  Positive for activity change and fever. HENT:  Positive for congestion. Eyes: Negative. Respiratory:  Positive for cough, chest tightness and shortness of breath. Cardiovascular:  Negative for chest pain, palpitations and leg swelling. Gastrointestinal: Negative. Endocrine: Negative. Genitourinary: Negative. Musculoskeletal: Negative. Skin: Negative. Allergic/Immunologic: Negative. Neurological:  Negative for dizziness, tremors, seizures, syncope, facial asymmetry, speech difficulty, weakness, light-headedness, numbness and headaches. Hematological: Negative. Psychiatric/Behavioral: Negative.          Past Medical and Surgical History:   Past Medical History: Diagnosis Date    Arthritis     (L) hip    Cataract     PHIL    COPD (chronic obstructive pulmonary disease) (HCC)     Hernia of abdominal wall     Hip joint pain     (L)    [place] (hard of hearing)     phil hearing aides    Hypertension     Macular degeneration     phil    Psoriasis     elbows and ankles- small  red open areas left ankle    Pulmonary emphysema (HCC)     Wears glasses        Past Surgical History:   Procedure Laterality Date    CARPAL TUNNEL RELEASE Bilateral     CERVICAL FUSION      C 3/4    COLONOSCOPY      FL ARTHRP ACETBLR/PROX FEM PROSTC AGRFT/ALGRFT Left 2/26/2016    Procedure: ARTHROPLASTY HIP TOTAL;  Surgeon: Jessee Drummond MD;  Location: [facility];  Service: Orthopedics       Meds/Allergies:  Prior to Admission medications    Medication Sig Start Date End Date Taking? Authorizing Provider   albuterol (ACCUNEB) 1.25 MG/3ML nebulizer solution Take 1.25 mg by nebulization every 6 (six) hours as needed for wheezing    Historical Provider, MD   albuterol (Ventolin HFA) 90 mcg/act inhaler Inhale 2 puffs every 6 (six) hours as needed for wheezing 10/6/21   Nataliia Page PA-C   aspirin 81 mg chewable tablet Chew 81 mg daily    Historical Provider, MD   atorvastatin (LIPITOR) 20 mg tablet  8/7/19   Historical Provider, MD   DM-APAP-CPM (CORICIDIN HBP PO) Take by mouth    Historical Provider, MD   Propylene Glycol (SYSTANE BALANCE) 0.6 % SOLN Apply to eye    Historical Provider, MD   Spacer/Aero-Holding Chambers (OptiChamber [place]) [address] 9/14/21   Historical Provider, MD   umeclidinium bromide (Incruse Ellipta) 62.5 mcg/inh AEPB inhaler Inhale 1 puff daily 9/23/21 10/23/21  Demarcus Verduzco MD     I have reviewed home medications with patient personally. Allergies:    Allergies   Allergen Reactions    Amoxicillin Rash    Polyethylene Glycol Hives    Lisinopril Other (See Comments)     dizziness    Gabapentin      Other reaction(s): PSORASIS FLAIR UP    Latanoprost Eye Swelling    Lumigan [Bimatoprost] Other (See Comments)     Burning in eyes    Meloxicam GI Intolerance    Chlorhexidine Rash       Social History:  Marital Status: /Civil Union   Occupation: retired  Patient Pre-hospital Living Situation: Home  Patient Pre-hospital Level of Mobility: walks  Patient Pre-hospital Diet Restrictions: Cardiac diet  Substance Use History:   Social History     Substance and Sexual Activity   Alcohol Use Not Currently    Comment: 1x year     Social History     Tobacco Use   Smoking Status Former    Packs/day: 2.00    Years: 40.00    Total pack years: 80.00    Types: Cigarettes    Quit date: 1/20/2013    Years since quitting: 10.7   Smokeless Tobacco Never     Social History     Substance and Sexual Activity   Drug Use No       Family History:  History reviewed. No pertinent family history. Physical Exam:     Vitals:   Blood Pressure: 131/91 (10/29/23 1015)  Pulse: 105 (10/29/23 1015)  Temperature: 97.7 °F (36.5 °C) (10/29/23 1015)  Temp Source: Oral (10/29/23 1015)  Respirations: 19 (10/29/23 1015)  Height: 6' 1" (185.4 cm) (10/29/23 0630)  Weight - Scale: (!) 149 kg (327 lb 13.2 oz) (10/29/23 0630)  SpO2: 97 % (10/29/23 1015)    Physical Exam  Constitutional:       General: He is not in acute distress. Appearance: He is obese. He is not ill-appearing or toxic-appearing. HENT:      Head: Normocephalic and atraumatic. Mouth/Throat:      Mouth: Mucous membranes are dry. Pharynx: Oropharynx is clear. Cardiovascular:      Rate and Rhythm: Rhythm irregular. Heart sounds: No murmur heard. No gallop. Pulmonary:      Effort: Respiratory distress present. Abdominal:      General: Abdomen is flat. There is no distension. Palpations: Abdomen is soft. There is no mass. Musculoskeletal:         General: Swelling present. Normal range of motion. Skin:     General: Skin is warm and dry. Neurological:      General: No focal deficit present.       Mental Status: He is alert and oriented to person, place, and time. Mental status is at baseline. Psychiatric:         Mood and Affect: Mood normal.         Behavior: Behavior normal.         Thought Content: Thought content normal.         Judgment: Judgment normal.          Additional Data:     Lab Results:  Results from last 7 days   Lab Units 10/29/23  0644   WBC Thousand/uL 9.33   HEMOGLOBIN g/dL 14.2   HEMATOCRIT % 44.7   PLATELETS Thousands/uL 213   LYMPHO PCT % 9*   MONO PCT % 10   EOS PCT % 0     Results from last 7 days   Lab Units 10/29/23  0644   SODIUM mmol/L 136   POTASSIUM mmol/L 3.8   CHLORIDE mmol/L 103   CO2 mmol/L 26   BUN mg/dL 21   CREATININE mg/dL 1.10   ANION GAP mmol/L 7   CALCIUM mg/dL 8.4   ALBUMIN g/dL 3.6   TOTAL BILIRUBIN mg/dL 2.64*   ALK PHOS U/L 49   ALT U/L 20   AST U/L 22   GLUCOSE RANDOM mg/dL 137     Results from last 7 days   Lab Units 10/29/23  0644   INR  1.17             Results from last 7 days   Lab Units 10/29/23  0644   LACTIC ACID mmol/L 1.4   PROCALCITONIN ng/ml 0.18       Lines/Drains:  Invasive Devices       Peripheral Intravenous Line  Duration             Peripheral IV 10/29/23 Distal;Left;Upper;Ventral (anterior) Arm <1 day    Peripheral IV 10/29/23 Left;Ventral (anterior) Forearm <1 day                        Imaging: Reviewed radiology reports from this admission including: chest CT scan  CTA ed chest pe study   Final Result by Gustavo Segovia MD (10/29 0935)      1. No pulmonary embolism or pneumonia. 2.  Few mildly enlarged mediastinal lymph nodes may be reactive, but given patient's cancer history, correlation with prior imaging is advised . 3. Few small areas of groundglass in the left lower lobe measuring up to 1.3 cm may be due to motion artifact, focal atelectasis or a nodule(s). Correlation with prior imaging is advised.                   Workstation performed: [id number]         XR chest portable    (Results Pending)       EKG and Other Studies Reviewed on Admission:   EKG: Atrial fibrillation. . ** Please Note: This note has been constructed using a voice recognition system.  **

## 2023-10-29 NOTE — PLAN OF CARE
Problem: PAIN - ADULT  Goal: Verbalizes/displays adequate comfort level or baseline comfort level  Description: Interventions:  - Encourage patient to monitor pain and request assistance  - Assess pain using appropriate pain scale  - Administer analgesics based on type and severity of pain and evaluate response  - Implement non-pharmacological measures as appropriate and evaluate response  - Consider cultural and social influences on pain and pain management  - Notify physician/advanced practitioner if interventions unsuccessful or patient reports new pain  Outcome: Progressing     Problem: INFECTION - ADULT  Goal: Absence or prevention of progression during hospitalization  Description: INTERVENTIONS:  - Assess and monitor for signs and symptoms of infection  - Monitor lab/diagnostic results  - Monitor all insertion sites, i.e. indwelling lines, tubes, and drains  - Monitor endotracheal if appropriate and nasal secretions for changes in amount and color  - [place] appropriate cooling/warming therapies per order  - Administer medications as ordered  - Instruct and encourage patient and family to use good hand hygiene technique  - Identify and instruct in appropriate isolation precautions for identified infection/condition  Outcome: Progressing  Goal: Absence of fever/infection during neutropenic period  Description: INTERVENTIONS:  - Monitor WBC    Outcome: Progressing     Problem: SAFETY ADULT  Goal: Patient will remain free of falls  Description: INTERVENTIONS:  - Educate patient/family on patient safety including physical limitations  - Instruct patient to call for assistance with activity   - Consult OT/PT to assist with strengthening/mobility   - Keep Call bell within reach  - Keep bed low and locked with side rails adjusted as appropriate  - Keep care items and personal belongings within reach  - Initiate and maintain comfort rounds  - Make Fall Risk Sign visible to staff  - Apply yellow socks and bracelet for high fall risk patients  - Consider moving patient to room near nurses station  Outcome: Progressing  Goal: Maintain or return to baseline ADL function  Description: INTERVENTIONS:  -  Assess patient's ability to carry out ADLs; assess patient's baseline for ADL function and identify physical deficits which impact ability to perform ADLs (bathing, care of mouth/teeth, toileting, grooming, dressing, etc.)  - Assess/evaluate cause of self-care deficits   - Assess range of motion  - Assess patient's mobility; develop plan if impaired  - Assess patient's need for assistive devices and provide as appropriate  - Encourage maximum independence but intervene and supervise when necessary  - Involve family in performance of ADLs  - Assess for home care needs following discharge   - Consider OT consult to assist with ADL evaluation and planning for discharge  - Provide patient education as appropriate  Outcome: Progressing  Goal: Maintains/Returns to pre admission functional level  Description: INTERVENTIONS:  - Perform BMAT or MOVE assessment daily.   - Set and communicate daily mobility goal to care team and patient/family/caregiver. - Collaborate with rehabilitation services on mobility goals if consulted  - Reposition patient every 2 hours.   - Out of bed for toileting  - Record patient progress and toleration of activity level   Outcome: Progressing     Problem: DISCHARGE PLANNING  Goal: Discharge to home or other facility with appropriate resources  Description: INTERVENTIONS:  - Identify barriers to discharge w/patient and caregiver  - Arrange for needed discharge resources and transportation as appropriate  - Identify discharge learning needs (meds, wound care, etc.)  - Arrange for interpretive services to assist at discharge as needed  - Refer to Case Management Department for coordinating discharge planning if the patient needs post-hospital services based on physician/advanced practitioner order or complex needs related to functional status, cognitive ability, or social support system  Outcome: Progressing     Problem: Knowledge Deficit  Goal: Patient/family/caregiver demonstrates understanding of disease process, treatment plan, medications, and discharge instructions  Description: Complete learning assessment and assess knowledge base.   Interventions:  - Provide teaching at level of understanding  - Provide teaching via preferred learning methods  Outcome: Progressing     Problem: Potential for Falls  Goal: Patient will remain free of falls  Description: INTERVENTIONS:  - Educate patient/family on patient safety including physical limitations  - Instruct patient to call for assistance with activity   - Consult OT/PT to assist with strengthening/mobility   - Keep Call bell within reach  - Keep bed low and locked with side rails adjusted as appropriate  - Keep care items and personal belongings within reach  - Initiate and maintain comfort rounds  - Make Fall Risk Sign visible to staff  - Offer Toileting every 2 Hours, in advance of need  - Apply yellow socks and bracelet for high fall risk patients  - Consider moving patient to room near nurses station  Outcome: Progressing

## 2023-10-29 NOTE — DISCHARGE INSTRUCTIONS
Extravasation   WHAT YOU NEED TO KNOW:   What is extravasation? Extravasation is when fluid leaks out of your vein and into the soft tissue around an IV. The fluid is a vesicant medicine. This means that it can cause tissue damage, blisters, or severe tissue loss. Some examples of vesicant medicines include chemo medicines, contrast liquid, certain antibiotics, and seizure medicine. What are the signs and symptoms of extravasation? Pain around the IV site    Inflammation and tightness of your skin    Trouble flushing the IV catheter    Pale, cool skin    Blisters    What causes extravasation? The IV catheter may push through the side of your vein. The IV catheter may move from where it is inserted. Fluid may leak through the area where the catheter enters the vein. The vein may be fragile and may tear with the IV fluid. Fluid may leak through the side of your vein. A blockage may cause the medicine or fluid to build up. What increases my risk for extravasation? Fragile veins or skin    Not being able to see the IV site    IV treatments over a long period of time    IV treatments that are given quickly or that give a large amount of fluid    Type of medicine being given, such as a chemo medicine    How is extravasation treated? Your healthcare provider may do any or all of the following:  Stop the IV infusion    Remove the IV    Outline the area with a marker    Take pictures of the area    Raise and rest your limb on pillows    Apply a hot or cold compress on the area    Inject medicine into the tissue    How can I manage my symptoms? Continue to apply ice on the area  as directed or for 15 to 20 minutes every hour. Use an ice pack, or put crushed ice in a plastic bag. Cover it with a towel. Ice helps prevent tissue damage and decreases swelling and pain. Continue to apply heat on the area  as directed or for 20 to 30 minutes every 2 hours.  Heat helps decrease pain and muscle spasms. Continue to elevate the area  above the level of your heart as often as you can. This will help decrease swelling and pain. Prop your limb on pillows or blankets to keep it elevated comfortably. Ask how to clean the area. Your healthcare provider may bandage the area. He or she may tell you which products you can apply on the area, such as a mild soap. Medicine  may help relieve pain or inflammation:    Acetaminophen  decreases pain and fever. It is available without a doctor's order. Ask how much to take and how often to take it. Follow directions. Read the labels of all other medicines you are using to see if they also contain acetaminophen, or ask your doctor or pharmacist. Acetaminophen can cause liver damage if not taken correctly. Do not use more than 4 grams (4,000 milligrams) total of acetaminophen in one day. NSAIDs , such as ibuprofen, help decrease swelling, pain, and fever. This medicine is available with or without a doctor's order. NSAIDs can cause stomach bleeding or kidney problems in certain people. If you take blood thinner medicine, always ask your healthcare provider if NSAIDs are safe for you. Always read the medicine label and follow directions. When should I seek immediate care? You have severe pain. When should I call my doctor? You have a fever. Your pain does not get better, or gets worse. The area becomes more red and swollen. You see red streaks coming from the area. You have questions or concerns about your condition or care. CARE AGREEMENT:   You have the right to help plan your care. Learn about your health condition and how it may be treated. Discuss treatment options with your healthcare providers to decide what care you want to receive. You always have the right to refuse treatment. The above information is an  only. It is not intended as medical advice for individual conditions or treatments.  Talk to your doctor, nurse or pharmacist before following any medical regimen to see if it is safe and effective for you. © Copyright [facility] 2022 Information is for End User's use only and may not be sold, redistributed or otherwise used for commercial purposes.  All illustrations and images included in CareNotes® are the copyrighted property of [employer] Inc[employer] or [address] [place] [address]

## 2023-10-29 NOTE — ASSESSMENT & PLAN NOTE
Not on home O2 requirement, s/p lung mass resection, in remission  Does not appear to be in acute exacerbation.   - wean O2 for COVID as able  - continue prn oxygen

## 2023-10-29 NOTE — ASSESSMENT & PLAN NOTE
No home O2 requirement, s/p lung mass resection, in remission  - wean O2 for COVID as able  - continue prn oxygen

## 2023-10-29 NOTE — ASSESSMENT & PLAN NOTE
COVID 19 positive in ED with 3 weeks of URI symptoms, given <3L, treating as mild  Criteria for mild COVID pathway based on 2 to 3 L of oxygen requirement on admission. Continue with IV dexamethasone and remdesivir. Wean oxygen as tolerated  Follow-up on inflammatory markers  Currently on heparin gtt.  for atrial fibrillation

## 2023-10-29 NOTE — ASSESSMENT & PLAN NOTE
COVID positive in ED with 3 weeks of URI symptoms, given <3L, treating as mild  - START IV dexamethasone   - Wean O2 as able (no home O2 prior)  - Continue lovenox for COVID, order anti- Xa  - discuss transition to DOAC ([id number] > 2)  - CTA without PE

## 2023-10-29 NOTE — ED PROVIDER NOTES
History  Chief Complaint   Patient presents with    Shortness of Breath     Pt c/o increased sob w/productive cough, sweats, dry mouth and dizziness starting yesterday. Hx copd, lung ca. Pt mentioned having 3 ticks last week-1 removed but unable remove other 2 on neck and back. Denies travel/n/v/d     75year old male is hard of hearing, spouse is excellent historian describes URI/flulike illness after vaccinations in September has had 2 courses of antibiotics, steroids and presents for worsening dyspnea since yesterday, rough breathing despite albuterol inhaler few times daily as well as nebulized albuterol daily. Fever or chills for 2 days. No hemoptysis. Past medical history includes lung cancer, resection and chemo 2019 with associated DVT, not currently on oral anticoagulation. Cardiologist started Lasix 5/2340 mg daily, repeated heart catheterization with stable coronary artery disease. History of heart attack or stroke. No history of atrial fibrillation      History provided by:  Patient and spouse  Shortness of Breath  Onset quality:  Gradual  Duration:  3 weeks  Timing:  Constant  Progression:  Worsening  Chronicity:  New  Context: URI    Relieved by: Inhaler and sitting up  Worsened by: Activity  Associated symptoms: fever and sputum production    Associated symptoms: no abdominal pain, no chest pain and no hemoptysis    Risk factors: hx of cancer, hx of PE/DVT and obesity    Risk factors: no tobacco use        Prior to Admission Medications   Prescriptions Last Dose Informant Patient Reported? Taking?    DM-APAP-CPM (CORICIDIN HBP PO)   Yes No   Sig: Take by mouth   Propylene Glycol (SYSTANE BALANCE) 0.6 % SOLN   Yes No   Sig: Apply to eye   Spacer/Aero-Holding Chambers (Juan Georges) MISC   Yes No   Sig: USE WITH INHALER   albuterol (ACCUNEB) 1.25 MG/3ML nebulizer solution   Yes No   Sig: Take 1.25 mg by nebulization every 6 (six) hours as needed for wheezing   albuterol (Ventolin HFA) 90 mcg/act inhaler   No No   Sig: Inhale 2 puffs every 6 (six) hours as needed for wheezing   aspirin 81 mg chewable tablet   Yes No   Sig: Chew 81 mg daily   atorvastatin (LIPITOR) 20 mg tablet   Yes No   umeclidinium bromide (Incruse Ellipta) 62.5 mcg/inh AEPB inhaler   No No   Sig: Inhale 1 puff daily      Facility-Administered Medications: None       Past Medical History:   Diagnosis Date    Arthritis     (L) hip    Cataract     PHIL    COPD (chronic obstructive pulmonary disease) (HCC)     Hernia of abdominal wall     Hip joint pain     (L)    [place] (hard of hearing)     phil hearing aides    Hypertension     Macular degeneration     phil    Psoriasis     elbows and ankles- small  red open areas left ankle    Pulmonary emphysema (HCC)     Wears glasses        Past Surgical History:   Procedure Laterality Date    CARPAL TUNNEL RELEASE Bilateral     CERVICAL FUSION      C 3/4    COLONOSCOPY      AK ARTHRP ACETBLR/PROX FEM PROSTC AGRFT/ALGRFT Left 2/26/2016    Procedure: ARTHROPLASTY HIP TOTAL;  Surgeon: Juan Hudson MD;  Location: [facility];  Service: Orthopedics       History reviewed. No pertinent family history. I have reviewed and agree with the history as documented. E-Cigarette/Vaping    E-Cigarette Use Never User      E-Cigarette/Vaping Substances     Social History     Tobacco Use    Smoking status: Former     Packs/day: 2.00     Years: 40.00     Total pack years: 80.00     Types: Cigarettes     Quit date: 1/20/2013     Years since quitting: 10.7    Smokeless tobacco: Never   Vaping Use    Vaping Use: Never used   Substance Use Topics    Alcohol use: Not Currently     Comment: 1x year    Drug use: No       Review of Systems   Constitutional:  Positive for fever. Respiratory:  Positive for sputum production and shortness of breath. Negative for hemoptysis. Cardiovascular:  Negative for chest pain. Gastrointestinal:  Negative for abdominal pain.    All other systems reviewed and are negative. Physical Exam  Physical Exam  Vitals and nursing note reviewed. Constitutional:       General: He is not in acute distress. Appearance: He is obese. Comments: Pleasant, comfortable-appearing, conversational, hearing aids in place, skin diaphoretic, cool   HENT:      Head: Normocephalic and atraumatic. Mouth/Throat:      Mouth: Mucous membranes are moist.      Pharynx: Oropharynx is clear. Eyes:      Conjunctiva/sclera: Conjunctivae normal.      Pupils: Pupils are equal, round, and reactive to light. Neck:      Vascular: No hepatojugular reflux. Cardiovascular:      Rate and Rhythm: Tachycardia present. Rhythm irregular. Heart sounds: Normal heart sounds. Pulmonary:      Effort: Pulmonary effort is normal. No tachypnea, accessory muscle usage or respiratory distress. Breath sounds: No stridor. Rhonchi present. Abdominal:      General: Bowel sounds are normal. There is no distension. Palpations: Abdomen is soft. Tenderness: There is no abdominal tenderness. Musculoskeletal:         General: No deformity. Cervical back: Neck supple. Right lower leg: Edema present. Left lower leg: Edema present. Skin:     General: Skin is warm and dry. Neurological:      General: No focal deficit present. Mental Status: He is alert and oriented to person, place, and time. Cranial Nerves: No cranial nerve deficit. Coordination: Coordination normal.   Psychiatric:         Behavior: Behavior normal.         Thought Content:  Thought content normal.         Judgment: Judgment normal.         Vital Signs  ED Triage Vitals [10/29/23 0630]   Temperature Pulse Respirations Blood Pressure SpO2   97.9 °F (36.6 °C) 65 18 121/65 94 %      Temp Source Heart Rate Source Patient Position - Orthostatic VS BP Location FiO2 (%)   Tympanic Monitor Lying Left arm --      Pain Score       No Pain           Vitals:    10/29/23 0730 10/29/23 0800 10/29/23 0815 10/29/23 0930   BP: 108/56 114/69 101/61 119/58   Pulse: 94 92 102 94   Patient Position - Orthostatic VS:             Visual Acuity      ED Medications  Medications   diltiazem (CARDIZEM) 125 mg in sodium chloride 0.9 % 125 mL infusion (0 mg/hr Intravenous Hold 10/29/23 0726)   heparin (porcine) 25,000 units in 0.45% NaCl 250 mL infusion (premix) (18 Units/kg/hr × 125 kg (Order-Specific) Intravenous New Bag 10/29/23 0923)   heparin (porcine) injection 10,000 Units (has no administration in time range)   heparin (porcine) injection 5,000 Units (has no administration in time range)   methylPREDNISolone sodium succinate (Solu-MEDROL) injection 125 mg (125 mg Intravenous Given 10/29/23 0645)   diltiazem (CARDIZEM) injection 10 mg (10 mg Intravenous Given 10/29/23 0714)   sodium chloride 0.9 % bolus 500 mL (0 mL Intravenous Stopped 10/29/23 0726)   heparin (porcine) injection 10,000 Units (10,000 Units Intravenous Given 10/29/23 0916)   iohexol (OMNIPAQUE) 350 MG/ML injection (SINGLE-DOSE) 100 mL (100 mL Intravenous Given 10/29/23 0910)       Diagnostic Studies  Results Reviewed       Procedure Component Value Units Date/Time    HS Troponin I 2hr [[id number]]  (Normal) Collected: 10/29/23 0840    Lab Status: Final result Specimen: Blood from Arm, Right Updated: 10/29/23 0906     hs TnI 2hr 27 ng/L      Delta 2hr hsTnI -2 ng/L     Manual Differential(PHLEBS Do Not Order) [[id number]]  (Abnormal) Collected: 10/29/23 0644    Lab Status: Final result Specimen: Blood from Arm, Right Updated: 10/29/23 0810     Segmented % 81 %      Lymphocytes % 9 %      Monocytes % 10 %      Eosinophils, % 0 %      Basophils % 0 %      Absolute Neutrophils 7.56 Thousand/uL      Lymphocytes Absolute 0.84 Thousand/uL      Monocytes Absolute 0.93 Thousand/uL      Eosinophils Absolute 0.00 Thousand/uL      Basophils Absolute 0.00 Thousand/uL      Total Counted --     Toxic Granulation Present     RBC Morphology Present     Platelet Estimate Adequate     Giant PLTs Present     Ovalocytes Present    HS Troponin I 4hr [[id number]]     Lab Status: No result Specimen: Blood     D-dimer, quantitative [[id number]]  (Abnormal) Collected: 10/29/23 0644    Lab Status: Final result Specimen: Blood from Arm, Right Updated: 10/29/23 0751     D-Dimer, Quant 2.09 ug/ml FEU     Narrative: In the evaluation for possible pulmonary embolism, in the appropriate (Well's Score of 4 or less) patient, the age adjusted d-dimer cutoff for this patient can be calculated as:    Age x 0.01 (in ug/mL) for Age-adjusted D-dimer exclusion threshold for a patient over 50 years. COVID19, Influenza A/B, RSV PCR, [facility] [[id number]]  (Abnormal) Collected: 10/29/23 0644    Lab Status: Final result Specimen: Nares from Nose Updated: 10/29/23 0748     SARS-CoV-2 Positive     INFLUENZA A PCR Negative     INFLUENZA B PCR Negative     RSV PCR Negative    Narrative:      FOR PEDIATRIC PATIENTS - copy/paste COVID Guidelines URL to browser: [email or web address]    SARS-CoV-2 assay is a Nucleic Acid Amplification assay intended for the  qualitative detection of nucleic acid from SARS-CoV-2 in nasopharyngeal  swabs. Results are for the presumptive identification of SARS-CoV-2 RNA. Positive results are indicative of infection with SARS-CoV-2, the virus  causing COVID-19, but do not rule out bacterial infection or co-infection  with other viruses. Laboratories within the [facility] and its  territories are required to report all positive results to the appropriate  public health authorities. Negative results do not preclude SARS-CoV-2  infection and should not be used as the sole basis for treatment or other  patient management decisions. Negative results must be combined with  clinical observations, patient history, and epidemiological information. This test has not been FDA cleared or approved.     This test has been authorized by FDA under an Emergency Use Authorization  (EUA). This test is only authorized for the duration of time the  declaration that circumstances exist justifying the authorization of the  emergency use of an in vitro diagnostic tests for detection of SARS-CoV-2  virus and/or diagnosis of COVID-19 infection under section 564(b)(1) of  the Act, 21 U. S.C. 438NCO-4(D)(7), unless the authorization is terminated  or revoked sooner. The test has been validated but independent review by FDA  and CLIA is pending. Test performed using [employer] Gene[employer]pert: This RT-PCR assay targets N2,  a region unique to SARS-CoV-2. A conserved region in the E-gene was chosen  for pan-Sarbecovirus detection which includes SARS-CoV-2. According to CMS-2020-01-R, this platform meets the definition of high-throughput technology. TSH, 3rd generation with Free T4 reflex [[id number]]     Lab Status: No result Specimen: Blood     Magnesium [[id number]]     Lab Status: No result Specimen: Blood     Protime-INR [[id number]]  (Abnormal) Collected: 10/29/23 0644    Lab Status: Final result Specimen: Blood from Arm, Right Updated: 10/29/23 0726     Protime 15.2 seconds      INR 1.17    APTT [[id number]]  (Normal) Collected: 10/29/23 0644    Lab Status: Final result Specimen: Blood from Arm, Right Updated: 10/29/23 0726     PTT 30 seconds     Procalcitonin [[id number]]  (Normal) Collected: 10/29/23 0644    Lab Status: Final result Specimen: Blood from Arm, Right Updated: 10/29/23 0723     Procalcitonin 0.18 ng/ml     HS Troponin 0hr (reflex protocol) [[id number]]  (Normal) Collected: 10/29/23 0644    Lab Status: Final result Specimen: Blood from Arm, Right Updated: 10/29/23 0722     hs TnI 0hr 29 ng/L     Lyme Total AB W Reflex to IGM/IGG [[id number]] Collected: 10/29/23 7268    Lab Status: In process Specimen: Blood from Arm, Right Updated: 10/29/23 9836    Narrative:       The following orders were created for panel order Lyme Total AB W Reflex to IGM/IGG. Procedure                               Abnormality         Status                     ---------                               -----------         ------                     Lyme Total AB W Reflex preethi Bartlett [[id number]]                      In process                   Please view results for these tests on the individual orders. Lyme Total AB W Reflex to IGM/IGG [[id number]] Collected: 10/29/23 5589    Lab Status: In process Specimen: Blood from Arm, Right Updated: 10/29/23 0721    B-Type Natriuretic Peptide(BNP) [[id number]]  (Abnormal) Collected: 10/29/23 0644    Lab Status: Final result Specimen: Blood from Arm, Right Updated: 10/29/23 0720      pg/mL     Lactic acid, plasma (w/reflex if result > 2.0) [[id number]]  (Normal) Collected: 10/29/23 0644    Lab Status: Final result Specimen: Blood from Arm, Right Updated: 10/29/23 0716     LACTIC ACID 1.4 mmol/L     Narrative:      Result may be elevated if tourniquet was used during collection.     Comprehensive metabolic panel [[id number]]  (Abnormal) Collected: 10/29/23 0644    Lab Status: Final result Specimen: Blood from Arm, Right Updated: 10/29/23 0716     Sodium 136 mmol/L      Potassium 3.8 mmol/L      Chloride 103 mmol/L      CO2 26 mmol/L      ANION GAP 7 mmol/L      BUN 21 mg/dL      Creatinine 1.10 mg/dL      Glucose 137 mg/dL      Calcium 8.4 mg/dL      AST 22 U/L      ALT 20 U/L      Alkaline Phosphatase 49 U/L      Total Protein 6.3 g/dL      Albumin 3.6 g/dL      Total Bilirubin 2.64 mg/dL      eGFR 65 ml/min/1.73sq m     Narrative:      [place] guidelines for Chronic Kidney Disease (CKD):     Stage 1 with normal or high GFR (GFR > 90 mL/min/1.73 square meters)    Stage 2 Mild CKD (GFR = 60-89 mL/min/1.73 square meters)    Stage 3A Moderate CKD (GFR = 45-59 mL/min/1.73 square meters)    Stage 3B Moderate CKD (GFR = 30-44 mL/min/1.73 square meters)    Stage 4 Severe CKD (GFR = 15-29 mL/min/1.73 square meters)    Stage 5 End Stage CKD (GFR <15 mL/min/1.73 square meters)  Note: GFR calculation is accurate only with a steady state creatinine    CBC and differential [[id number]]  (Normal) Collected: 10/29/23 0644    Lab Status: Final result Specimen: Blood from Arm, Right Updated: 10/29/23 0702     WBC 9.33 Thousand/uL      RBC 4.73 Million/uL      Hemoglobin 14.2 g/dL      Hematocrit 44.7 %      MCV 95 fL      MCH 30.0 pg      MCHC 31.8 g/dL      RDW 13.4 %      MPV 9.5 fL      Platelets 490 Thousands/uL     Beta Hydroxybutyrate [[id number]]  (Normal) Collected: 10/29/23 0644    Lab Status: Final result Specimen: Blood from Arm, Right Updated: 10/29/23 0700     BETA-HYDROXYBUTYRATE 0.0 mmol/L     Blood gas, venous [[id number]]  (Abnormal) Collected: 10/29/23 0644    Lab Status: Final result Specimen: Blood from Arm, Right Updated: 10/29/23 0656     pH, Calvin 7.508     pCO2, Calvin 32.9 mm Hg      pO2, Calvin 98.5 mm Hg      HCO3, Calvin 25.5 mmol/L      Base Excess, Calvin 3.1 mmol/L      O2 Content, Calvin 20.6 ml/dL      O2 HGB, VENOUS 95.3 %     Blood culture [[id number]] Collected: 10/29/23 0644    Lab Status: In process Specimen: Blood from Hand, Right Updated: 10/29/23 0652    Blood culture [[id number]] Collected: 10/29/23 0644    Lab Status: In process Specimen: Blood from Arm, Right Updated: 10/29/23 0057    UA w Reflex to Microscopic w Reflex to Culture [[id number]]     Lab Status: No result Specimen: Urine                    CTA ed chest pe study   Final Result by Amanda Tee MD (10/29 0935)      1. No pulmonary embolism or pneumonia. 2.  Few mildly enlarged mediastinal lymph nodes may be reactive, but given patient's cancer history, correlation with prior imaging is advised . 3. Few small areas of groundglass in the left lower lobe measuring up to 1.3 cm may be due to motion artifact, focal atelectasis or a nodule(s). Correlation with prior imaging is advised.                   Workstation performed: [id number]         XR chest portable    (Results Pending)              Procedures  CriticalCare Time    Date/Time: 10/29/2023 8:35 AM    Performed by: Angela Briggs DO  Authorized by: Angela Briggs DO    Critical care provider statement:     Critical care time (minutes):  30    Critical care time was exclusive of:  Separately billable procedures and treating other patients    Critical care was necessary to treat or prevent imminent or life-threatening deterioration of the following conditions:  Circulatory failure, cardiac failure and respiratory failure    Critical care was time spent personally by me on the following activities:  Obtaining history from patient or surrogate, development of treatment plan with patient or surrogate, discussions with consultants, evaluation of patient's response to treatment, examination of patient, interpretation of cardiac output measurements, ordering and performing treatments and interventions, ordering and review of laboratory studies, ordering and review of radiographic studies, re-evaluation of patient's condition and review of old charts           ED Course  ED Course as of 10/29/23 1004   Sun Oct 29, 2023   0647 EKG 6:32 AM rapid atrial fibrillation rate 139 normal axis normal QRS and QTc T wave inversion V1 PVCs no ST elevation or depression interpreted by me   0655 Hypotension 88/50, heart rate 110s, will provide small bolus IV fluids, decrease rate medications and monitoring closely   0717 BP improves to sbp 90s, diltiazem 5 mg improves HR to , holding infusion   0725 BNP(!): 376   0726 hs TnI 0hr: 29   0726 LACTIC ACID: 1.4   0726 Procalcitonin: 0.18   0726 TOTAL BILIRUBIN(!): 2.64   0726 pH, Calvin(!): 7.508   0726 pO2, Calvin(!): 98.5   0726 XR chest portable  Blunted costophrenic angle right, mildly cephalized, compared to prior   0728 Minimal IV fluids infused, less than 200 cc, hypotension improved to , heart rate 100s, mild dyspnea with lower O2 saturation 88-91 on room air, supplemental oxygen 2L provided increasing to 98%   0737 CCM Onye TT   0743 CCM Onye will evaluate   0752 SARS-COV-2(!): Positive   0752 D-Dimer, Quant(!): 2.09   0753 LACTIC ACID: 1.4   0826 CCM Bk stable for SD2   0827 HM Markus TT                                             Medical Decision Making  Amount and/or Complexity of Data Reviewed  Labs: ordered. Decision-making details documented in ED Course. Radiology: ordered and independent interpretation performed. Decision-making details documented in ED Course. ECG/medicine tests: independent interpretation performed. Decision-making details documented in ED Course. Risk  Prescription drug management. Decision regarding hospitalization. Disposition  Final diagnoses:   COPD exacerbation ([address])   Rapid atrial fibrillation ([address])   Hypotension   Hypoxemia   SARS-CoV-2 positive     Time reflects when diagnosis was documented in both MDM as applicable and the Disposition within this note       Time User Action Codes Description Comment    10/29/2023  6:58 AM Zazueta Sprawls Add [J44.1] COPD exacerbation ([address])     10/29/2023  6:58 AM Zazueta Sprawls Add [I48.91] Rapid atrial fibrillation ([address])     10/29/2023  7:53 AM Zazueta Sprawls Add [I95.9] Hypotension     10/29/2023  7:53 AM Zazueta Sprawls Add [R09.02] Hypoxemia     10/29/2023  7:53 AM Zazueta Sprawls Add [U07.1] SARS-CoV-2 positive           ED Disposition       ED Disposition   Admit    Condition   Stable    Date/Time   Sun Oct 29, 2023  8:34 AM    Comment   Case was discussed with Jen Mallory and the patient's admission status was agreed to be Admission Status: inpatient status to the service of Dr. Jen Mallory.                Follow-up Information    None         Current Discharge Medication List        CONTINUE these medications which have NOT CHANGED    Details   albuterol (ACCUNEB) 1.25 MG/3ML nebulizer solution Take 1.25 mg by nebulization every 6 (six) hours as needed for wheezing albuterol (Ventolin HFA) 90 mcg/act inhaler Inhale 2 puffs every 6 (six) hours as needed for wheezing  Qty: 18 g, Refills: 5    Comments: Substitution to a formulary equivalent within the same pharmaceutical class is authorized. Associated Diagnoses: Mixed simple and mucopurulent chronic bronchitis (HCC)      aspirin 81 mg chewable tablet Chew 81 mg daily      atorvastatin (LIPITOR) 20 mg tablet       DM-APAP-CPM (CORICIDIN HBP PO) Take by mouth      Propylene Glycol (SYSTANE BALANCE) 0.6 % SOLN Apply to eye      Spacer/Aero-Holding Chambers ([employer]iChamber [place]) MISC USE WITH INHALER      umeclidinium bromide (Incruse Ellipta) 62.5 mcg/inh AEPB inhaler Inhale 1 puff daily  Qty: 30 blister, Refills: 0    Associated Diagnoses: Chronic bronchitis, unspecified chronic bronchitis type ([address])             No discharge procedures on file.     PDMP Review       None            ED Provider  Electronically Signed by             Bernabe Sethi DO  10/29/23 0795       Bernabe Sethi DO  10/29/23 Ervin Owen DO  10/29/23 1001

## 2023-10-29 NOTE — ASSESSMENT & PLAN NOTE
Presented with NEW onset Atrial fibrillation with RVR in ED, in the  setting of COVID 19 section. [id number] of at least 2,   History of prior perioperative PE in setting of resection of lung cancer in remission, had been off anticoagulation after completing 6 months of treatment. .   Still with heart rate varying between 100-130. Cardiology input appreciated. Increase Lopressor to 25 mg p.o. every 6 hourly. Continue with telemetry monitoring  Continue with heparin GTT. Price check for Eliquis and subsequently transition to Eliquis 5 mg twice daily on discharge. Follow-up on echocardiogram  No plan for DC cardioversion per cardiology.

## 2023-10-29 NOTE — ASSESSMENT & PLAN NOTE
No home meds other than lasix, initiated by cardiology in May 2023.   - START metop 12.5 BID  - STOP IV dilt, s/p dilt in ED  - Continue home furosemide

## 2023-10-29 NOTE — ASSESSMENT & PLAN NOTE
Prior PE in the setting of resection of lung mass,   Completed anticoagulation for 6 months. Now on heparin gtt. for new atrial fibrillation.

## 2023-10-30 ENCOUNTER — APPOINTMENT (INPATIENT)
Dept: NON INVASIVE DIAGNOSTICS | Facility: HOSPITAL | Age: 73
DRG: 177 | End: 2023-10-30
Payer: MEDICARE

## 2023-10-30 LAB
ANION GAP SERPL CALCULATED.3IONS-SCNC: 7 MMOL/L
AORTIC ROOT: 4.2 CM
APTT PPP: 134 SECONDS (ref 23–37)
APTT PPP: 56 SECONDS (ref 23–37)
APTT PPP: 73 SECONDS (ref 23–37)
B BURGDOR IGG+IGM SER QL IA: NEGATIVE
BASOPHILS # BLD AUTO: 0.01 THOUSANDS/ÂΜL (ref 0–0.1)
BASOPHILS NFR BLD AUTO: 0 % (ref 0–1)
BUN SERPL-MCNC: 20 MG/DL (ref 5–25)
CALCIUM SERPL-MCNC: 8.7 MG/DL (ref 8.4–10.2)
CHLORIDE SERPL-SCNC: 103 MMOL/L (ref 96–108)
CO2 SERPL-SCNC: 28 MMOL/L (ref 21–32)
CREAT SERPL-MCNC: 0.73 MG/DL (ref 0.6–1.3)
EOSINOPHIL # BLD AUTO: 0 THOUSAND/ÂΜL (ref 0–0.61)
EOSINOPHIL NFR BLD AUTO: 0 % (ref 0–6)
ERYTHROCYTE [DISTWIDTH] IN BLOOD BY AUTOMATED COUNT: 13.2 % (ref 11.6–15.1)
FRACTIONAL SHORTENING: 22 (ref 28–44)
GFR SERPL CREATININE-BSD FRML MDRD: 91 ML/MIN/1.73SQ M
GLUCOSE SERPL-MCNC: 144 MG/DL (ref 65–140)
HCT VFR BLD AUTO: 44.9 % (ref 36.5–49.3)
HEPARIN CF II AG PPP IA-MCNC: 1.03 IU/ML
HGB BLD-MCNC: 14 G/DL (ref 12–17)
IMM GRANULOCYTES # BLD AUTO: 0.04 THOUSAND/UL (ref 0–0.2)
IMM GRANULOCYTES NFR BLD AUTO: 1 % (ref 0–2)
INTERVENTRICULAR SEPTUM IN DIASTOLE (PARASTERNAL SHORT AXIS VIEW): 1.2 CM
INTERVENTRICULAR SEPTUM: 1.2 CM (ref 0.6–1.1)
LEFT ATRIUM SIZE: 4.5 CM
LEFT INTERNAL DIMENSION IN SYSTOLE: 4.5 CM (ref 2.1–4)
LEFT VENTRICULAR INTERNAL DIMENSION IN DIASTOLE: 5.8 CM (ref 3.5–6)
LEFT VENTRICULAR POSTERIOR WALL IN END DIASTOLE: 1.2 CM
LEFT VENTRICULAR STROKE VOLUME: 77 ML
LVSV (TEICH): 77 ML
LYMPHOCYTES # BLD AUTO: 0.78 THOUSANDS/ÂΜL (ref 0.6–4.47)
LYMPHOCYTES NFR BLD AUTO: 9 % (ref 14–44)
MCH RBC QN AUTO: 29.8 PG (ref 26.8–34.3)
MCHC RBC AUTO-ENTMCNC: 31.2 G/DL (ref 31.4–37.4)
MCV RBC AUTO: 96 FL (ref 82–98)
MONOCYTES # BLD AUTO: 1.11 THOUSAND/ÂΜL (ref 0.17–1.22)
MONOCYTES NFR BLD AUTO: 13 % (ref 4–12)
NEUTROPHILS # BLD AUTO: 6.37 THOUSANDS/ÂΜL (ref 1.85–7.62)
NEUTS SEG NFR BLD AUTO: 77 % (ref 43–75)
NRBC BLD AUTO-RTO: 0 /100 WBCS
PLATELET # BLD AUTO: 220 THOUSANDS/UL (ref 149–390)
PMV BLD AUTO: 9.3 FL (ref 8.9–12.7)
POTASSIUM SERPL-SCNC: 4 MMOL/L (ref 3.5–5.3)
QRS AXIS: 7 DEGREES
QRSD INTERVAL: 82 MS
QT INTERVAL: 294 MS
QTC INTERVAL: 447 MS
RBC # BLD AUTO: 4.7 MILLION/UL (ref 3.88–5.62)
SL CV LV EF: 45
SL CV PED ECHO LEFT VENTRICLE DIASTOLIC VOLUME (MOD BIPLANE) 2D: 167 ML
SL CV PED ECHO LEFT VENTRICLE SYSTOLIC VOLUME (MOD BIPLANE) 2D: 90 ML
SODIUM SERPL-SCNC: 138 MMOL/L (ref 135–147)
T WAVE AXIS: 43 DEGREES
VENTRICULAR RATE: 139 BPM
WBC # BLD AUTO: 8.31 THOUSAND/UL (ref 4.31–10.16)

## 2023-10-30 PROCEDURE — 94760 N-INVAS EAR/PLS OXIMETRY 1: CPT

## 2023-10-30 PROCEDURE — 94640 AIRWAY INHALATION TREATMENT: CPT

## 2023-10-30 PROCEDURE — 94664 DEMO&/EVAL PT USE INHALER: CPT

## 2023-10-30 PROCEDURE — 80048 BASIC METABOLIC PNL TOTAL CA: CPT | Performed by: NURSE PRACTITIONER

## 2023-10-30 PROCEDURE — 93321 DOPPLER ECHO F-UP/LMTD STD: CPT

## 2023-10-30 PROCEDURE — 85730 THROMBOPLASTIN TIME PARTIAL: CPT | Performed by: NURSE PRACTITIONER

## 2023-10-30 PROCEDURE — 99232 SBSQ HOSP IP/OBS MODERATE 35: CPT | Performed by: INTERNAL MEDICINE

## 2023-10-30 PROCEDURE — 85025 COMPLETE CBC W/AUTO DIFF WBC: CPT | Performed by: NURSE PRACTITIONER

## 2023-10-30 PROCEDURE — 85730 THROMBOPLASTIN TIME PARTIAL: CPT | Performed by: INTERNAL MEDICINE

## 2023-10-30 PROCEDURE — 93325 DOPPLER ECHO COLOR FLOW MAPG: CPT

## 2023-10-30 PROCEDURE — 93308 TTE F-UP OR LMTD: CPT

## 2023-10-30 PROCEDURE — 85730 THROMBOPLASTIN TIME PARTIAL: CPT | Performed by: STUDENT IN AN ORGANIZED HEALTH CARE EDUCATION/TRAINING PROGRAM

## 2023-10-30 PROCEDURE — XW033E5 INTRODUCTION OF REMDESIVIR ANTI-INFECTIVE INTO PERIPHERAL VEIN, PERCUTANEOUS APPROACH, NEW TECHNOLOGY GROUP 5: ICD-10-PCS | Performed by: STUDENT IN AN ORGANIZED HEALTH CARE EDUCATION/TRAINING PROGRAM

## 2023-10-30 RX ORDER — FUROSEMIDE 40 MG/1
40 TABLET ORAL DAILY
COMMUNITY

## 2023-10-30 RX ORDER — CALCIUM CARBONATE 300MG(750)
1 TABLET,CHEWABLE ORAL 3 TIMES DAILY
COMMUNITY

## 2023-10-30 RX ORDER — DEXAMETHASONE SODIUM PHOSPHATE 10 MG/ML
6 INJECTION, SOLUTION INTRAMUSCULAR; INTRAVENOUS EVERY 24 HOURS
Status: DISCONTINUED | OUTPATIENT
Start: 2023-10-30 | End: 2023-11-03 | Stop reason: HOSPADM

## 2023-10-30 RX ORDER — ALBUTEROL SULFATE 2.5 MG/3ML
2.5 SOLUTION RESPIRATORY (INHALATION) EVERY 6 HOURS PRN
COMMUNITY

## 2023-10-30 RX ADMIN — METOPROLOL TARTRATE 25 MG: 25 TABLET, FILM COATED ORAL at 20:49

## 2023-10-30 RX ADMIN — Medication 12.5 MG: at 09:24

## 2023-10-30 RX ADMIN — HEPARIN SODIUM 15 UNITS/KG/HR: 10000 INJECTION, SOLUTION INTRAVENOUS at 00:00

## 2023-10-30 RX ADMIN — ALBUTEROL SULFATE 1.25 MG: 2.5 SOLUTION RESPIRATORY (INHALATION) at 09:50

## 2023-10-30 RX ADMIN — Medication 400 MG: at 17:29

## 2023-10-30 RX ADMIN — DEXAMETHASONE SODIUM PHOSPHATE 6 MG: 10 INJECTION, SOLUTION INTRAMUSCULAR; INTRAVENOUS at 10:26

## 2023-10-30 RX ADMIN — REMDESIVIR 200 MG: 100 INJECTION, POWDER, LYOPHILIZED, FOR SOLUTION INTRAVENOUS at 10:41

## 2023-10-30 RX ADMIN — ASPIRIN 81 MG 81 MG: 81 TABLET ORAL at 09:24

## 2023-10-30 RX ADMIN — Medication 400 MG: at 09:24

## 2023-10-30 RX ADMIN — METOPROLOL TARTRATE 25 MG: 25 TABLET, FILM COATED ORAL at 16:13

## 2023-10-30 RX ADMIN — ALBUTEROL SULFATE 1.25 MG: 2.5 SOLUTION RESPIRATORY (INHALATION) at 01:47

## 2023-10-30 RX ADMIN — HEPARIN SODIUM 12 UNITS/KG/HR: 10000 INJECTION, SOLUTION INTRAVENOUS at 16:08

## 2023-10-30 RX ADMIN — FUROSEMIDE 20 MG: 20 TABLET ORAL at 09:24

## 2023-10-30 RX ADMIN — ATORVASTATIN CALCIUM 20 MG: 20 TABLET, FILM COATED ORAL at 16:08

## 2023-10-30 RX ADMIN — HEPARIN SODIUM 5000 UNITS: 1000 INJECTION INTRAVENOUS; SUBCUTANEOUS at 18:52

## 2023-10-30 NOTE — CONSULTS
Consultation - Cardiology   Matthieu Lovelace 75 y.o. male MRN: [id number]  Unit/Bed#: -01 Encounter: [id number]    Assessment/Plan     Assessment:  New Onset AFIB with RVR  Hypoxia  Acute COVID infection  Mild to moderate CAD by cath 2023  Mild - Moderate AS  Chronic HFpEF on daily lasix  COPD - not on home O2  H/x PE in setting of lung mass removal  H/x [place] CA - surgery, chemo  Former smoker      Plan:  Continue with rate control therapy and anticoagulation for now  Not appear to be any significant CHF at this time. Increase metoprolol to 25 mg every 6 hours orally  Did get hypotensive with diltiazem  Would rec transition to oral Eliquis 5mg bid when able  Await ECHO  No plan for DCCV st this time. Rate control for now. Hopefully with improvement in his breathing/infectious status he may return to NSR. Pt does not currently notice his AFIB. Can continue low-dose oral diuretic as needed. History of Present Illness   Physician Requesting Consult: Carlitos Padilla MD  Reason for Consult / Principal Problem: AFIB with RVR    HPI: Matthieu Lovelace is a 75y.o. year old male who presents with weeks of URI type symptoms. Noted cough, SOB and did not respond to outpt Abx and steroids. Given progressive symptoms, pt decided to come to ER for further evaluation. In the ER he was noted to have respiratory failure as well as new onset atrial fibrillation. He was given diltiazem but that did cause some hypotension and has been switched over to metoprolol. Heart rates remain uncontrolled so we will titrate his oral medication. Is any recent palpitations, anginal chest pain, lower extremity edema, TIA or CVA symptoms. Currently he notes his breathing feels markedly better compared to admission.       Inpatient consult to Cardiology  Consult performed by: Leopold Mom, DO  Consult ordered by: Carlitos Padilla MD        Review of Systems    Historical Information   Past Medical History:   Diagnosis Date Arthritis     (L) hip    Cataract     PHIL    COPD (chronic obstructive pulmonary disease) (HCC)     Hernia of abdominal wall     Hip joint pain     (L)    [place] (hard of hearing)     phil hearing aides    Hypertension     Macular degeneration     phil    Psoriasis     elbows and ankles- small  red open areas left ankle    Pulmonary emphysema (HCC)     Wears glasses      Past Surgical History:   Procedure Laterality Date    CARPAL TUNNEL RELEASE Bilateral     CERVICAL FUSION      C 3/4    COLONOSCOPY      ID ARTHRP ACETBLR/PROX FEM PROSTC AGRFT/ALGRFT Left 2/26/2016    Procedure: ARTHROPLASTY HIP TOTAL;  Surgeon: Chaparrita Mooney MD;  Location: [facility];  Service: Orthopedics     Social History     Substance and Sexual Activity   Alcohol Use Not Currently    Comment: 1x year     Social History     Substance and Sexual Activity   Drug Use No     E-Cigarette/Vaping    E-Cigarette Use Never User      E-Cigarette/Vaping Substances     Social History     Tobacco Use   Smoking Status Former    Packs/day: 2.00    Years: 40.00    Total pack years: 80.00    Types: Cigarettes    Quit date: 1/20/2013    Years since quitting: 10.7   Smokeless Tobacco Never     Family History: History reviewed. No pertinent family history. Meds/Allergies   all current active meds have been reviewed  Allergies   Allergen Reactions    Amoxicillin Rash    Polyethylene Glycol Hives    Lisinopril Other (See Comments)     dizziness    Gabapentin      Other reaction(s): PSORASIS FLAIR UP    Latanoprost Eye Swelling    Lumigan [Bimatoprost] Other (See Comments)     Burning in eyes    Meloxicam GI Intolerance    Chlorhexidine Rash       Objective   Vitals: Blood pressure 159/76, pulse 99, temperature 97.9 °F (36.6 °C), temperature source Oral, resp. rate (!) 27, height 6' 1" (1.854 m), weight (!) 148 kg (327 lb), SpO2 98 %.   Orthostatic Blood Pressures      Flowsheet Row Most Recent Value   Blood Pressure 159/76 filed at 10/30/2023 1799   Patient Position - Orthostatic VS Lying filed at 10/30/2023 0400              Intake/Output Summary (Last 24 hours) at 10/30/2023 0910  Last data filed at 10/30/2023 0600  Gross per 24 hour   Intake 605.81 ml   Output 1400 ml   Net -794.19 ml       Invasive Devices       Peripheral Intravenous Line  Duration             Peripheral IV 10/29/23 Distal;Left;Upper;Ventral (anterior) Arm <1 day    Peripheral IV 10/29/23 Left;Ventral (anterior) Forearm <1 day                    Physical Exam    General: No acute distress  Neck: Soft, supple, no JVD appreciated, no carotid bruits appreciated  Cardiovascular tachycardic, irregular irregular rhythm, early MATTHEW across precordium  Lungs: Mildly diminished breath sounds globally, breath sounds are coarse  Abdomen: Soft, obese, nontender, positive bowel sounds  Extremities: Minimal lower extreme edema with varicosities    Lab Results: I have personally reviewed pertinent lab results.     CBC with diff:   Results from last 7 days   Lab Units 10/30/23  0527   WBC Thousand/uL 8.31   RBC Million/uL 4.70   HEMOGLOBIN g/dL 14.0   HEMATOCRIT % 44.9   MCV fL 96   MCH pg 29.8   MCHC g/dL 31.2*   RDW % 13.2   MPV fL 9.3   PLATELETS Thousands/uL 220     CMP:   Results from last 7 days   Lab Units 10/30/23  0527 10/29/23  0644   SODIUM mmol/L 138 136   CHLORIDE mmol/L 103 103   CO2 mmol/L 28 26   BUN mg/dL 20 21   CREATININE mg/dL 0.73 1.10   CALCIUM mg/dL 8.7 8.4   AST U/L  --  22   ALT U/L  --  20   ALK PHOS U/L  --  49   EGFR ml/min/1.73sq m 91 65     HS Troponin:   0   Lab Value Date/Time    HSTNI0 29 10/29/2023 0644    HSTNI2 27 10/29/2023 0840    HSTNI4 24 10/29/2023 1024     BNP:   Results from last 7 days   Lab Units 10/30/23  0527   POTASSIUM mmol/L 4.0   CHLORIDE mmol/L 103   CO2 mmol/L 28   BUN mg/dL 20   CREATININE mg/dL 0.73   CALCIUM mg/dL 8.7   EGFR ml/min/1.73sq m 91     Coags:   Results from last 7 days   Lab Units 10/30/23  0129 10/29/23  1541 10/29/23  0644   PTT seconds 134*   < > 30   INR --   --  1.17    < > = values in this interval not displayed. TSH:   Results from last 7 days   Lab Units 10/29/23  0644   TSH 3RD GENERATON uIU/mL 1. 141     Magnesium:   Results from last 7 days   Lab Units 10/29/23  0644   MAGNESIUM mg/dL 1.8*     Imaging: I have personally reviewed pertinent reports. and I have personally reviewed pertinent films in PACS    EKG: My interpretation-atrial fibrillation with rapid ventricular spots, nonspecific ST changes. Aberrant conducted beat versus PVC      ECHO 10/30/23: Pending - will personally read  CXR:  No acute cardiopulmonary disease. Status post right lower lobectomy. Code Status: Level 1 - Full Code  CT PE:  1. No pulmonary embolism or pneumonia. 2.  Few mildly enlarged mediastinal lymph nodes may be reactive, but given patient's cancer history, correlation with prior imaging is advised . 3. Few small areas of groundglass in the left lower lobe measuring up to 1.3 cm may be due to motion artifact, focal atelectasis or a nodule(s). Ex stress: 4/2023:  Non diagnostic given failure to achieve 85% MPHR. 2.3 METS        Counseling / Coordination of Care  Total floor / unit time spent today 52 minutes. Greater than 50% of total time was spent with the patient and / or family counseling and / or coordination of care.

## 2023-10-30 NOTE — CASE MANAGEMENT
Case Management Discharge Planning Note    Patient name Jackie Feliz  Location /-66 MRN [id number]  : 1950 Date 10/30/2023       Current Admission Date: 10/29/2023  Current Admission Diagnosis:Atrial fibrillation [facility])   Patient Active Problem List    Diagnosis Date Noted    Lung mass 10/29/2023    COVID 10/29/2023    Atrial fibrillation ([address]) 10/29/2023    Morbid obesity ([address]) 2021    Chronic bronchitis ([address]) 2021    Mixed simple and mucopurulent chronic bronchitis ([address]) 2019    Hypoxemia requiring supplemental oxygen 2019    History of pulmonary embolism 2019    Malignant neoplasm of lower lobe, right bronchus or lung ([address]) 2019    Essential (primary) hypertension 2016    Chronic obstructive pulmonary disease, unspecified ([address]) 2012      LOS (days): 1  Geometric Mean LOS (GMLOS) (days): 3.50  Days to GMLOS:2.2     OBJECTIVE:  Risk of Unplanned Readmission Score: 11.84         Current admission status: Inpatient   Preferred Pharmacy:   [address] [place] [address]  Phone: [phone] Fax: [phone]    Primary Care Provider: Flaquita Salgado DO    Primary Insurance: MEDICARE  Secondary Insurance: [facility]    DISCHARGE DETAILS:           CM contacted [address]: [facility] $151.00. CM can provide one month free coupon to patient.

## 2023-10-30 NOTE — PLAN OF CARE
Problem: PAIN - ADULT  Goal: Verbalizes/displays adequate comfort level or baseline comfort level  Description: Interventions:  - Encourage patient to monitor pain and request assistance  - Assess pain using appropriate pain scale  - Administer analgesics based on type and severity of pain and evaluate response  - Implement non-pharmacological measures as appropriate and evaluate response  - Consider cultural and social influences on pain and pain management  - Notify physician/advanced practitioner if interventions unsuccessful or patient reports new pain  Outcome: Progressing     Problem: INFECTION - ADULT  Goal: Absence or prevention of progression during hospitalization  Description: INTERVENTIONS:  - Assess and monitor for signs and symptoms of infection  - Monitor lab/diagnostic results  - Monitor all insertion sites, i.e. indwelling lines, tubes, and drains  - Monitor endotracheal if appropriate and nasal secretions for changes in amount and color  - [place] appropriate cooling/warming therapies per order  - Administer medications as ordered  - Instruct and encourage patient and family to use good hand hygiene technique  - Identify and instruct in appropriate isolation precautions for identified infection/condition  Outcome: Progressing  Goal: Absence of fever/infection during neutropenic period  Description: INTERVENTIONS:  - Monitor WBC    Outcome: Progressing     Problem: SAFETY ADULT  Goal: Patient will remain free of falls  Description: INTERVENTIONS:  - Educate patient/family on patient safety including physical limitations  - Instruct patient to call for assistance with activity   - Consult OT/PT to assist with strengthening/mobility   - Keep Call bell within reach  - Keep bed low and locked with side rails adjusted as appropriate  - Keep care items and personal belongings within reach  - Initiate and maintain comfort rounds  - Make Fall Risk Sign visible to staff  - Offer Toileting every 2 Hours, in advance of need  - Initiate/Maintain alarm  - Obtain necessary fall risk management equipment:   - Apply yellow socks and bracelet for high fall risk patients  - Consider moving patient to room near nurses station  Outcome: Progressing  Goal: Maintain or return to baseline ADL function  Description: INTERVENTIONS:  -  Assess patient's ability to carry out ADLs; assess patient's baseline for ADL function and identify physical deficits which impact ability to perform ADLs (bathing, care of mouth/teeth, toileting, grooming, dressing, etc.)  - Assess/evaluate cause of self-care deficits   - Assess range of motion  - Assess patient's mobility; develop plan if impaired  - Assess patient's need for assistive devices and provide as appropriate  - Encourage maximum independence but intervene and supervise when necessary  - Involve family in performance of ADLs  - Assess for home care needs following discharge   - Consider OT consult to assist with ADL evaluation and planning for discharge  - Provide patient education as appropriate  Outcome: Not Progressing  Goal: Maintains/Returns to pre admission functional level  Description: INTERVENTIONS:  - Perform BMAT or MOVE assessment daily.   - Set and communicate daily mobility goal to care team and patient/family/caregiver. - Collaborate with rehabilitation services on mobility goals if consulted  - Perform Range of Motion 3 times a day. - Reposition patient every 2 hours.   - Dangle patient 3 times a day  - Stand patient 3 times a day  - Ambulate patient 3 times a day  - Out of bed to chair 3 times a day   - Out of bed for meals 3 times a day  - Out of bed for toileting  - Record patient progress and toleration of activity level   Outcome: Not Progressing     Problem: DISCHARGE PLANNING  Goal: Discharge to home or other facility with appropriate resources  Description: INTERVENTIONS:  - Identify barriers to discharge w/patient and caregiver  - Arrange for needed discharge resources and transportation as appropriate  - Identify discharge learning needs (meds, wound care, etc.)  - Arrange for interpretive services to assist at discharge as needed  - Refer to Case Management Department for coordinating discharge planning if the patient needs post-hospital services based on physician/advanced practitioner order or complex needs related to functional status, cognitive ability, or social support system  Outcome: Progressing     Problem: Knowledge Deficit  Goal: Patient/family/caregiver demonstrates understanding of disease process, treatment plan, medications, and discharge instructions  Description: Complete learning assessment and assess knowledge base.   Interventions:  - Provide teaching at level of understanding  - Provide teaching via preferred learning methods  Outcome: Progressing     Problem: Potential for Falls  Goal: Patient will remain free of falls  Description: INTERVENTIONS:  - Educate patient/family on patient safety including physical limitations  - Instruct patient to call for assistance with activity   - Consult OT/PT to assist with strengthening/mobility   - Keep Call bell within reach  - Keep bed low and locked with side rails adjusted as appropriate  - Keep care items and personal belongings within reach  - Initiate and maintain comfort rounds  - Make Fall Risk Sign visible to staff  - Offer Toileting every 2 Hours, in advance of need  - Initiate/Maintain alarm  - Obtain necessary fall risk management equipment:   - Apply yellow socks and bracelet for high fall risk patients  - Consider moving patient to room near nurses station  Outcome: Progressing     Problem: MOBILITY - ADULT  Goal: Maintain or return to baseline ADL function  Description: INTERVENTIONS:  -  Assess patient's ability to carry out ADLs; assess patient's baseline for ADL function and identify physical deficits which impact ability to perform ADLs (bathing, care of mouth/teeth, toileting, grooming, dressing, etc.)  - Assess/evaluate cause of self-care deficits   - Assess range of motion  - Assess patient's mobility; develop plan if impaired  - Assess patient's need for assistive devices and provide as appropriate  - Encourage maximum independence but intervene and supervise when necessary  - Involve family in performance of ADLs  - Assess for home care needs following discharge   - Consider OT consult to assist with ADL evaluation and planning for discharge  - Provide patient education as appropriate  Outcome: Not Progressing  Goal: Maintains/Returns to pre admission functional level  Description: INTERVENTIONS:  - Perform BMAT or MOVE assessment daily.   - Set and communicate daily mobility goal to care team and patient/family/caregiver. - Collaborate with rehabilitation services on mobility goals if consulted  - Perform Range of Motion 3 times a day. - Reposition patient every 2 hours.   - Dangle patient 3 times a day  - Stand patient 3 times a day  - Ambulate patient 3 times a day  - Out of bed to chair 3 times a day   - Out of bed for meals 3 times a day  - Out of bed for toileting  - Record patient progress and toleration of activity level   Outcome: Not Progressing

## 2023-10-30 NOTE — NURSING NOTE
Reached out to patients primary RN Coby Abdalla in regard to PIV extravastaion on 10/29. Per RN no changes in neurovascular assessment on that arm, no pain, and only small amount of oozing. Made RN aware if patient is having any pain the recommendation is Ice, elevations, and if able to take tylenol, tylenol will help with the pain. If any issues regarding PIV extravasation please reach out of Radiology RN, Almas Chris via [address] or [phone].

## 2023-10-30 NOTE — PLAN OF CARE
Problem: PAIN - ADULT  Goal: Verbalizes/displays adequate comfort level or baseline comfort level  Description: Interventions:  - Encourage patient to monitor pain and request assistance  - Assess pain using appropriate pain scale  - Administer analgesics based on type and severity of pain and evaluate response  - Implement non-pharmacological measures as appropriate and evaluate response  - Consider cultural and social influences on pain and pain management  - Notify physician/advanced practitioner if interventions unsuccessful or patient reports new pain  10/30/2023 1437 by Kayce Saenz RN  Outcome: Progressing  10/30/2023 1435 by Kayce Saenz RN  Outcome: Progressing     Problem: INFECTION - ADULT  Goal: Absence or prevention of progression during hospitalization  Description: INTERVENTIONS:  - Assess and monitor for signs and symptoms of infection  - Monitor lab/diagnostic results  - Monitor all insertion sites, i.e. indwelling lines, tubes, and drains  - Monitor endotracheal if appropriate and nasal secretions for changes in amount and color  - [place] appropriate cooling/warming therapies per order  - Administer medications as ordered  - Instruct and encourage patient and family to use good hand hygiene technique  - Identify and instruct in appropriate isolation precautions for identified infection/condition  10/30/2023 1437 by Kayce Saenz RN  Outcome: Progressing  10/30/2023 1435 by Kayce Saenz RN  Outcome: Progressing  Goal: Absence of fever/infection during neutropenic period  Description: INTERVENTIONS:  - Monitor WBC    10/30/2023 1437 by Kayce Saenz RN  Outcome: Progressing  10/30/2023 1435 by Kayce Saenz RN  Outcome: Progressing     Problem: SAFETY ADULT  Goal: Patient will remain free of falls  Description: INTERVENTIONS:  - Educate patient/family on patient safety including physical limitations  - Instruct patient to call for assistance with activity   - Consult OT/PT to assist with strengthening/mobility   - Keep Call bell within reach  - Keep bed low and locked with side rails adjusted as appropriate  - Keep care items and personal belongings within reach  - Initiate and maintain comfort rounds  - Make Fall Risk Sign visible to staff  - Offer Toileting every 2 Hours, in advance of need  - Initiate/Maintain na alarm  - Obtain necessary fall risk management equipment: na  - Apply yellow socks and bracelet for high fall risk patients  - Consider moving patient to room near nurses station  10/30/2023 1437 by Alyssia Raman RN  Outcome: Progressing  10/30/2023 1435 by Alyssia Raman RN  Outcome: Progressing  Goal: Maintain or return to baseline ADL function  Description: INTERVENTIONS:  -  Assess patient's ability to carry out ADLs; assess patient's baseline for ADL function and identify physical deficits which impact ability to perform ADLs (bathing, care of mouth/teeth, toileting, grooming, dressing, etc.)  - Assess/evaluate cause of self-care deficits   - Assess range of motion  - Assess patient's mobility; develop plan if impaired  - Assess patient's need for assistive devices and provide as appropriate  - Encourage maximum independence but intervene and supervise when necessary  - Involve family in performance of ADLs  - Assess for home care needs following discharge   - Consider OT consult to assist with ADL evaluation and planning for discharge  - Provide patient education as appropriate  10/30/2023 1437 by Alyssia Raman RN  Outcome: Progressing  10/30/2023 1435 by Alyssia Raman RN  Outcome: Progressing  Goal: Maintains/Returns to pre admission functional level  Description: INTERVENTIONS:  - Perform BMAT or MOVE assessment daily.   - Set and communicate daily mobility goal to care team and patient/family/caregiver. - Collaborate with rehabilitation services on mobility goals if consulted  - Perform Range of Motion 2 times a day. - Reposition patient every 2 hours.   - Dangle patient 2 times a day  - Stand patient 2 times a day  - Ambulate patient 2 times a day  - Out of bed to chair 2 times a day   - Out of bed for meals 2 times a day  - Out of bed for toileting  - Record patient progress and toleration of activity level   10/30/2023 1437 by Alec Lucas RN  Outcome: Progressing  10/30/2023 1435 by Alec Lucas RN  Outcome: Progressing     Problem: DISCHARGE PLANNING  Goal: Discharge to home or other facility with appropriate resources  Description: INTERVENTIONS:  - Identify barriers to discharge w/patient and caregiver  - Arrange for needed discharge resources and transportation as appropriate  - Identify discharge learning needs (meds, wound care, etc.)  - Arrange for interpretive services to assist at discharge as needed  - Refer to Case Management Department for coordinating discharge planning if the patient needs post-hospital services based on physician/advanced practitioner order or complex needs related to functional status, cognitive ability, or social support system  10/30/2023 1437 by Alec Lucas RN  Outcome: Progressing  10/30/2023 1435 by Alec Lucas RN  Outcome: Progressing     Problem: Knowledge Deficit  Goal: Patient/family/caregiver demonstrates understanding of disease process, treatment plan, medications, and discharge instructions  Description: Complete learning assessment and assess knowledge base.   Interventions:  - Provide teaching at level of understanding  - Provide teaching via preferred learning methods  10/30/2023 1437 by Alec Lucas RN  Outcome: Progressing  10/30/2023 1435 by Alec Lucas RN  Outcome: Progressing     Problem: Potential for Falls  Goal: Patient will remain free of falls  Description: INTERVENTIONS:  - Educate patient/family on patient safety including physical limitations  - Instruct patient to call for assistance with activity   - Consult OT/PT to assist with strengthening/mobility   - Keep Call bell within reach  - Keep bed low and locked with side rails adjusted as appropriate  - Keep care items and personal belongings within reach  - Initiate and maintain comfort rounds  - Make Fall Risk Sign visible to staff  - Offer Toileting every 2 Hours, in advance of need  - Initiate/Maintain na alarm  - Obtain necessary fall risk management equipment: na  - Apply yellow socks and bracelet for high fall risk patients  - Consider moving patient to room near nurses station  10/30/2023 1437 by Marylin Barreto RN  Outcome: Progressing  10/30/2023 1435 by Marylin Barreto RN  Outcome: Progressing     Problem: MOBILITY - ADULT  Goal: Maintain or return to baseline ADL function  Description: INTERVENTIONS:  -  Assess patient's ability to carry out ADLs; assess patient's baseline for ADL function and identify physical deficits which impact ability to perform ADLs (bathing, care of mouth/teeth, toileting, grooming, dressing, etc.)  - Assess/evaluate cause of self-care deficits   - Assess range of motion  - Assess patient's mobility; develop plan if impaired  - Assess patient's need for assistive devices and provide as appropriate  - Encourage maximum independence but intervene and supervise when necessary  - Involve family in performance of ADLs  - Assess for home care needs following discharge   - Consider OT consult to assist with ADL evaluation and planning for discharge  - Provide patient education as appropriate  10/30/2023 1437 by Marylin Barreto RN  Outcome: Progressing  10/30/2023 1435 by Marylin Barreto RN  Outcome: Progressing  Goal: Maintains/Returns to pre admission functional level  Description: INTERVENTIONS:  - Perform BMAT or MOVE assessment daily.   - Set and communicate daily mobility goal to care team and patient/family/caregiver. - Collaborate with rehabilitation services on mobility goals if consulted  - Perform Range of Motion 2 times a day. - Reposition patient every 2 hours.   - Dangle patient 2 times a day  - Stand patient 2 times a day  - Ambulate patient 2 times a day  - Out of bed to chair 2 times a day   - Out of bed for meals 2 times a day  - Out of bed for toileting  - Record patient progress and toleration of activity level   10/30/2023 1437 by Sebastian Kincaid RN  Outcome: Progressing  10/30/2023 1435 by Sebastian Kincaid RN  Outcome: Progressing     Problem: Prexisting or High Potential for Compromised Skin Integrity  Goal: Skin integrity is maintained or improved  Description: INTERVENTIONS:  - Identify patients at risk for skin breakdown  - Assess and monitor skin integrity  - Assess and monitor nutrition and hydration status  - Monitor labs   - Assess for incontinence   - Turn and reposition patient  - Assist with mobility/ambulation  - Relieve pressure over bony prominences  - Avoid friction and shearing  - Provide appropriate hygiene as needed including keeping skin clean and dry  - Evaluate need for skin moisturizer/barrier cream  - Collaborate with interdisciplinary team   - Patient/family teaching  - Consider wound care consult   10/30/2023 1437 by Sebastian Kincaid RN  Outcome: Progressing  10/30/2023 1435 by Sebastian Kincaid RN  Outcome: Progressing

## 2023-10-30 NOTE — PROGRESS NOTES
[address] [facility]  Progress Note  Name: Murel Schlatter  MRN: [id number]  Unit/Bed#: -01 I Date of Admission: 10/29/2023   Date of Service: 10/30/2023 I Hospital Day: 1    Assessment/Plan   * Atrial fibrillation [facility])  Assessment & Plan  Presented with NEW onset Atrial fibrillation with RVR in ED, in the  setting of COVID 19 section. [id number] of at least 2,   History of prior perioperative PE in setting of resection of lung cancer in remission, had been off anticoagulation after completing 6 months of treatment. .   Still with heart rate varying between 100-130. Cardiology input appreciated. Increase Lopressor to 25 mg p.o. every 6 hourly. Continue with telemetry monitoring  Continue with heparin GTT. Price check for Eliquis and subsequently transition to Eliquis 5 mg twice daily on discharge. Follow-up on echocardiogram  No plan for DC cardioversion per cardiology. COVID  Assessment & Plan  COVID 19 positive in ED with 3 weeks of URI symptoms, given <3L, treating as mild  Criteria for mild COVID pathway based on 2 to 3 L of oxygen requirement on admission. Continue with IV dexamethasone and remdesivir. Wean oxygen as tolerated  Follow-up on inflammatory markers  Currently on heparin gtt. for atrial fibrillation    Chronic obstructive pulmonary disease, unspecified ([address])  Assessment & Plan  Not on home O2 requirement, s/p lung mass resection, in remission  Does not appear to be in acute exacerbation. - wean O2 for COVID as able  - continue prn oxygen    Essential (primary) hypertension  Assessment & Plan  No home meds other than lasix, initiated by cardiology in May 2023. Increased metoprolol 25 every 6 hourly  - STOP IV dilt, s/p dilt in ED  - Continue home furosemide -currently appears euvolemic.     Malignant neoplasm of lower lobe, right bronchus or lung [facility])  Assessment & Plan  Status post RLL lobectomy in 12/2018  Follows with Oncology at [facility]  Follow-up outpatient CT scan per pulmonary/oncology recommendations. History of pulmonary embolism  Assessment & Plan  Prior PE in the setting of resection of lung mass,   Completed anticoagulation for 6 months. Now on heparin gtt. for new atrial fibrillation. VTE Pharmacologic Prophylaxis:   High Risk (Score >/= 5) - Pharmacological DVT Prophylaxis Ordered: heparin drip. Sequential Compression Devices Ordered. Patient Centered Rounds: I performed bedside rounds with nursing staff today. Discussions with Specialists or Other Care Team Provider: Discussed with nursing    Education and Discussions with Family / Patient: Updated  (wife) at bedside. Total Time Spent on Date of Encounter in care of patient: 35 mins. This time was spent on one or more of the following: performing physical exam; counseling and coordination of care; obtaining or reviewing history; documenting in the medical record; reviewing/ordering tests, medications or procedures; communicating with other healthcare professionals and discussing with patient's family/caregivers. Current Length of Stay: 1 day(s)  Current Patient Status: Inpatient   Certification Statement: The patient will continue to require additional inpatient hospital stay due to ongoing management of A-fib. Discharge Plan: Anticipate discharge in 24-48 hrs to home. Code Status: Level 1 - Full Code    Subjective:   Seen and examined at bedside. Denies any palpitations, shortness of breath    Objective:     Vitals:   Temp (24hrs), Av.9 °F (36.6 °C), Min:97.7 °F (36.5 °C), Max:98.1 °F (36.7 °C)    Temp:  [97.7 °F (36.5 °C)-98.1 °F (36.7 °C)] 98.1 °F (36.7 °C)  HR:  [] 99  Resp:  [15-27] 27  BP: (128-159)/(76-99) 159/76  SpO2:  [93 %-99 %] 93 %  Body mass index is 43.14 kg/m². Input and Output Summary (last 24 hours):      Intake/Output Summary (Last 24 hours) at 10/30/2023 1109  Last data filed at 10/30/2023 0901  Gross per 24 hour   Intake 905.81 ml   Output 1400 ml   Net -494.19 ml       Physical Exam:   Physical Exam  Constitutional:       General: He is not in acute distress. HENT:      Head: Normocephalic. Nose: Nose normal.   Eyes:      Extraocular Movements: Extraocular movements intact. Pupils: Pupils are equal, round, and reactive to light. Cardiovascular:      Rate and Rhythm: Tachycardia present. Rhythm irregular. Heart sounds: No murmur heard. Pulmonary:      Comments: Managed breath sounds bilaterally with scattered expiratory wheezing. Abdominal:      General: Abdomen is flat. Bowel sounds are normal.      Palpations: Abdomen is soft. Musculoskeletal:      Cervical back: Neck supple. Right lower leg: No edema. Left lower leg: No edema. Skin:     General: Skin is warm. Neurological:      General: No focal deficit present. Mental Status: He is alert and oriented to person, place, and time. Mental status is at baseline.         Additional Data:     Labs:  Results from last 7 days   Lab Units 10/30/23  0527   WBC Thousand/uL 8.31   HEMOGLOBIN g/dL 14.0   HEMATOCRIT % 44.9   PLATELETS Thousands/uL 220   NEUTROS PCT % 77*   LYMPHS PCT % 9*   MONOS PCT % 13*   EOS PCT % 0     Results from last 7 days   Lab Units 10/30/23  0527 10/29/23  0644   SODIUM mmol/L 138 136   POTASSIUM mmol/L 4.0 3.8   CHLORIDE mmol/L 103 103   CO2 mmol/L 28 26   BUN mg/dL 20 21   CREATININE mg/dL 0.73 1.10   ANION GAP mmol/L 7 7   CALCIUM mg/dL 8.7 8.4   ALBUMIN g/dL  --  3.6   TOTAL BILIRUBIN mg/dL  --  2.64*   ALK PHOS U/L  --  49   ALT U/L  --  20   AST U/L  --  22   GLUCOSE RANDOM mg/dL 144* 137     Results from last 7 days   Lab Units 10/29/23  0644   INR  1.17             Results from last 7 days   Lab Units 10/29/23  0644   LACTIC ACID mmol/L 1.4   PROCALCITONIN ng/ml 0.18       Lines/Drains:  Invasive Devices       Peripheral Intravenous Line  Duration             Peripheral IV 10/29/23 Distal;Left;Upper;Ventral (anterior) Arm 1 day    Peripheral IV 10/29/23 Left;Ventral (anterior) Forearm 1 day                          Imaging: No pertinent imaging reviewed. Recent Cultures (last 7 days):   Results from last 7 days   Lab Units 10/29/23  0644   BLOOD CULTURE  Received in Microbiology Lab. Culture in Progress. Received in Microbiology Lab. Culture in Progress. Last 24 Hours Medication List:   Current Facility-Administered Medications   Medication Dose Route Frequency Provider Last Rate    albuterol  1.25 mg Nebulization Q6H PRN Bernadette Gregory MD      artificial tear   Both Eyes HS PRN Bernadette Gregory MD      aspirin  81 mg Oral Daily Bernadette Gregory MD      atorvastatin  20 mg Oral Daily With Faisal Bae MD      dexamethasone  6 mg Intravenous Q24H Amada Ponce MD      furosemide  20 mg Oral Daily Bernadette Gregory MD      heparin (porcine)  3-30 Units/kg/hr (Order-Specific) Intravenous Titrated Amada Ponce MD 12 Units/kg/hr (10/30/23 0400)    heparin (porcine)  10,000 Units Intravenous Q6H PRN Amada Ponce MD      heparin (porcine)  5,000 Units Intravenous Q6H PRN Amada Ponce MD      magnesium Oxide  400 mg Oral BID Bernadette Gregory MD      metoprolol tartrate  25 mg Oral Q6H Amado Armstrong DO      remdesivir  200 mg Intravenous Q24H Amada Ponce MD      Followed by    John Braga ON 10/31/2023] remdesivir  100 mg Intravenous Q24H Amada Ponce MD          Today, Patient Was Seen By: Amada Ponce MD    **Please Note: This note may have been constructed using a voice recognition system. **

## 2023-10-30 NOTE — ASSESSMENT & PLAN NOTE
Status post RLL lobectomy in 12/2018  Follows with Oncology at [facility]  Follow-up outpatient CT scan per pulmonary/oncology recommendations.

## 2023-10-31 PROBLEM — I50.33 ACUTE ON CHRONIC DIASTOLIC CONGESTIVE HEART FAILURE (HCC): Status: ACTIVE | Noted: 2023-10-31

## 2023-10-31 LAB
ALBUMIN SERPL BCP-MCNC: 3.5 G/DL (ref 3.5–5)
ALP SERPL-CCNC: 45 U/L (ref 34–104)
ALT SERPL W P-5'-P-CCNC: 35 U/L (ref 7–52)
ANION GAP SERPL CALCULATED.3IONS-SCNC: 5 MMOL/L
APTT PPP: 114 SECONDS (ref 23–37)
APTT PPP: 57 SECONDS (ref 23–37)
APTT PPP: 63 SECONDS (ref 23–37)
AST SERPL W P-5'-P-CCNC: 33 U/L (ref 13–39)
BASOPHILS # BLD AUTO: 0 THOUSANDS/ÂΜL (ref 0–0.1)
BASOPHILS NFR BLD AUTO: 0 % (ref 0–1)
BILIRUB SERPL-MCNC: 1.1 MG/DL (ref 0.2–1)
BUN SERPL-MCNC: 23 MG/DL (ref 5–25)
CALCIUM SERPL-MCNC: 8.7 MG/DL (ref 8.4–10.2)
CHLORIDE SERPL-SCNC: 105 MMOL/L (ref 96–108)
CO2 SERPL-SCNC: 29 MMOL/L (ref 21–32)
CREAT SERPL-MCNC: 0.82 MG/DL (ref 0.6–1.3)
CRP SERPL QL: 20.8 MG/L
EOSINOPHIL # BLD AUTO: 0 THOUSAND/ÂΜL (ref 0–0.61)
EOSINOPHIL NFR BLD AUTO: 0 % (ref 0–6)
ERYTHROCYTE [DISTWIDTH] IN BLOOD BY AUTOMATED COUNT: 13.3 % (ref 11.6–15.1)
GFR SERPL CREATININE-BSD FRML MDRD: 87 ML/MIN/1.73SQ M
GLUCOSE SERPL-MCNC: 137 MG/DL (ref 65–140)
HCT VFR BLD AUTO: 46.2 % (ref 36.5–49.3)
HGB BLD-MCNC: 14.8 G/DL (ref 12–17)
IMM GRANULOCYTES # BLD AUTO: 0.03 THOUSAND/UL (ref 0–0.2)
IMM GRANULOCYTES NFR BLD AUTO: 0 % (ref 0–2)
LYMPHOCYTES # BLD AUTO: 1.15 THOUSANDS/ÂΜL (ref 0.6–4.47)
LYMPHOCYTES NFR BLD AUTO: 11 % (ref 14–44)
MCH RBC QN AUTO: 31.1 PG (ref 26.8–34.3)
MCHC RBC AUTO-ENTMCNC: 32 G/DL (ref 31.4–37.4)
MCV RBC AUTO: 97 FL (ref 82–98)
MONOCYTES # BLD AUTO: 0.72 THOUSAND/ÂΜL (ref 0.17–1.22)
MONOCYTES NFR BLD AUTO: 7 % (ref 4–12)
NEUTROPHILS # BLD AUTO: 8.49 THOUSANDS/ÂΜL (ref 1.85–7.62)
NEUTS SEG NFR BLD AUTO: 82 % (ref 43–75)
NRBC BLD AUTO-RTO: 0 /100 WBCS
PLATELET # BLD AUTO: 220 THOUSANDS/UL (ref 149–390)
PMV BLD AUTO: 9.8 FL (ref 8.9–12.7)
POTASSIUM SERPL-SCNC: 4.4 MMOL/L (ref 3.5–5.3)
PROCALCITONIN SERPL-MCNC: 0.08 NG/ML
PROT SERPL-MCNC: 6.5 G/DL (ref 6.4–8.4)
RBC # BLD AUTO: 4.76 MILLION/UL (ref 3.88–5.62)
SODIUM SERPL-SCNC: 139 MMOL/L (ref 135–147)
WBC # BLD AUTO: 10.39 THOUSAND/UL (ref 4.31–10.16)

## 2023-10-31 PROCEDURE — 85730 THROMBOPLASTIN TIME PARTIAL: CPT | Performed by: FAMILY MEDICINE

## 2023-10-31 PROCEDURE — 99232 SBSQ HOSP IP/OBS MODERATE 35: CPT | Performed by: FAMILY MEDICINE

## 2023-10-31 PROCEDURE — 85730 THROMBOPLASTIN TIME PARTIAL: CPT | Performed by: INTERNAL MEDICINE

## 2023-10-31 PROCEDURE — 85025 COMPLETE CBC W/AUTO DIFF WBC: CPT | Performed by: INTERNAL MEDICINE

## 2023-10-31 PROCEDURE — 80053 COMPREHEN METABOLIC PANEL: CPT | Performed by: INTERNAL MEDICINE

## 2023-10-31 PROCEDURE — 86140 C-REACTIVE PROTEIN: CPT | Performed by: INTERNAL MEDICINE

## 2023-10-31 PROCEDURE — 84145 PROCALCITONIN (PCT): CPT | Performed by: INTERNAL MEDICINE

## 2023-10-31 RX ORDER — METOPROLOL TARTRATE 50 MG/1
50 TABLET, FILM COATED ORAL EVERY 6 HOURS
Status: DISCONTINUED | OUTPATIENT
Start: 2023-10-31 | End: 2023-11-01

## 2023-10-31 RX ORDER — FUROSEMIDE 10 MG/ML
20 INJECTION INTRAMUSCULAR; INTRAVENOUS ONCE
Status: COMPLETED | OUTPATIENT
Start: 2023-10-31 | End: 2023-10-31

## 2023-10-31 RX ADMIN — Medication 400 MG: at 17:24

## 2023-10-31 RX ADMIN — HEPARIN SODIUM 5000 UNITS: 1000 INJECTION INTRAVENOUS; SUBCUTANEOUS at 17:22

## 2023-10-31 RX ADMIN — Medication 400 MG: at 09:05

## 2023-10-31 RX ADMIN — HEPARIN SODIUM 13 UNITS/KG/HR: 10000 INJECTION, SOLUTION INTRAVENOUS at 23:57

## 2023-10-31 RX ADMIN — HEPARIN SODIUM 11 UNITS/KG/HR: 10000 INJECTION, SOLUTION INTRAVENOUS at 06:30

## 2023-10-31 RX ADMIN — ASPIRIN 81 MG 81 MG: 81 TABLET ORAL at 09:05

## 2023-10-31 RX ADMIN — FUROSEMIDE 20 MG: 20 TABLET ORAL at 09:05

## 2023-10-31 RX ADMIN — DEXAMETHASONE SODIUM PHOSPHATE 6 MG: 10 INJECTION, SOLUTION INTRAMUSCULAR; INTRAVENOUS at 09:08

## 2023-10-31 RX ADMIN — ATORVASTATIN CALCIUM 20 MG: 20 TABLET, FILM COATED ORAL at 15:50

## 2023-10-31 RX ADMIN — METOPROLOL TARTRATE 50 MG: 50 TABLET, FILM COATED ORAL at 15:50

## 2023-10-31 RX ADMIN — METOPROLOL TARTRATE 25 MG: 25 TABLET, FILM COATED ORAL at 02:04

## 2023-10-31 RX ADMIN — METOPROLOL TARTRATE 50 MG: 50 TABLET, FILM COATED ORAL at 21:33

## 2023-10-31 RX ADMIN — FUROSEMIDE 20 MG: 10 INJECTION, SOLUTION INTRAMUSCULAR; INTRAVENOUS at 10:55

## 2023-10-31 RX ADMIN — METOPROLOL TARTRATE 25 MG: 25 TABLET, FILM COATED ORAL at 09:06

## 2023-10-31 RX ADMIN — REMDESIVIR 100 MG: 100 INJECTION, POWDER, LYOPHILIZED, FOR SOLUTION INTRAVENOUS at 09:17

## 2023-10-31 NOTE — PLAN OF CARE
Problem: PAIN - ADULT  Goal: Verbalizes/displays adequate comfort level or baseline comfort level  Description: Interventions:  - Encourage patient to monitor pain and request assistance  - Assess pain using appropriate pain scale  - Administer analgesics based on type and severity of pain and evaluate response  - Implement non-pharmacological measures as appropriate and evaluate response  - Consider cultural and social influences on pain and pain management  - Notify physician/advanced practitioner if interventions unsuccessful or patient reports new pain  Outcome: Progressing     Problem: INFECTION - ADULT  Goal: Absence or prevention of progression during hospitalization  Description: INTERVENTIONS:  - Assess and monitor for signs and symptoms of infection  - Monitor lab/diagnostic results  - Monitor all insertion sites, i.e. indwelling lines, tubes, and drains  - Monitor endotracheal if appropriate and nasal secretions for changes in amount and color  - [place] appropriate cooling/warming therapies per order  - Administer medications as ordered  - Instruct and encourage patient and family to use good hand hygiene technique  - Identify and instruct in appropriate isolation precautions for identified infection/condition  Outcome: Progressing  Goal: Absence of fever/infection during neutropenic period  Description: INTERVENTIONS:  - Monitor WBC    Outcome: Progressing     Problem: SAFETY ADULT  Goal: Patient will remain free of falls  Description: INTERVENTIONS:  - Educate patient/family on patient safety including physical limitations  - Instruct patient to call for assistance with activity   - Consult OT/PT to assist with strengthening/mobility   - Keep Call bell within reach  - Keep bed low and locked with side rails adjusted as appropriate  - Keep care items and personal belongings within reach  - Initiate and maintain comfort rounds  - Make Fall Risk Sign visible to staff  - Offer Toileting every 2 Hours, in advance of need  - Initiate/Maintain na alarm  - Obtain necessary fall risk management equipment: na  - Apply yellow socks and bracelet for high fall risk patients  - Consider moving patient to room near nurses station  Outcome: Progressing     Problem: DISCHARGE PLANNING  Goal: Discharge to home or other facility with appropriate resources  Description: INTERVENTIONS:  - Identify barriers to discharge w/patient and caregiver  - Arrange for needed discharge resources and transportation as appropriate  - Identify discharge learning needs (meds, wound care, etc.)  - Arrange for interpretive services to assist at discharge as needed  - Refer to Case Management Department for coordinating discharge planning if the patient needs post-hospital services based on physician/advanced practitioner order or complex needs related to functional status, cognitive ability, or social support system  Outcome: Progressing     Problem: Knowledge Deficit  Goal: Patient/family/caregiver demonstrates understanding of disease process, treatment plan, medications, and discharge instructions  Description: Complete learning assessment and assess knowledge base.   Interventions:  - Provide teaching at level of understanding  - Provide teaching via preferred learning methods  Outcome: Progressing     Problem: MOBILITY - ADULT  Goal: Maintain or return to baseline ADL function  Description: INTERVENTIONS:  -  Assess patient's ability to carry out ADLs; assess patient's baseline for ADL function and identify physical deficits which impact ability to perform ADLs (bathing, care of mouth/teeth, toileting, grooming, dressing, etc.)  - Assess/evaluate cause of self-care deficits   - Assess range of motion  - Assess patient's mobility; develop plan if impaired  - Assess patient's need for assistive devices and provide as appropriate  - Encourage maximum independence but intervene and supervise when necessary  - Involve family in performance of ADLs  - Assess for home care needs following discharge   - Consider OT consult to assist with ADL evaluation and planning for discharge  - Provide patient education as appropriate  Outcome: Progressing     Problem: CARDIOVASCULAR - ADULT  Goal: Maintains optimal cardiac output and hemodynamic stability  Description: INTERVENTIONS:  - Monitor I/O, vital signs and rhythm  - Monitor for S/S and trends of decreased cardiac output  - Administer and titrate ordered vasoactive medications to optimize hemodynamic stability  - Assess quality of pulses, skin color and temperature  - Assess for signs of decreased coronary artery perfusion  - Instruct patient to report change in severity of symptoms  Outcome: Progressing  Goal: Absence of cardiac dysrhythmias or at baseline rhythm  Description: INTERVENTIONS:  - Continuous cardiac monitoring, vital signs, obtain 12 lead EKG if ordered  - Administer antiarrhythmic and heart rate control medications as ordered  - Monitor electrolytes and administer replacement therapy as ordered  Outcome: Progressing     Problem: RESPIRATORY - ADULT  Goal: Achieves optimal ventilation and oxygenation  Description: INTERVENTIONS:  - Assess for changes in respiratory status  - Assess for changes in mentation and behavior  - Position to facilitate oxygenation and minimize respiratory effort  - Oxygen administered by appropriate delivery if ordered  - Initiate smoking cessation education as indicated  - Encourage broncho-pulmonary hygiene including cough, deep breathe, Incentive Spirometry  - Assess the need for suctioning and aspirate as needed  - Assess and instruct to report SOB or any respiratory difficulty  - Respiratory Therapy support as indicated  Outcome: Progressing

## 2023-10-31 NOTE — PLAN OF CARE
Problem: PAIN - ADULT  Goal: Verbalizes/displays adequate comfort level or baseline comfort level  Description: Interventions:  - Encourage patient to monitor pain and request assistance  - Assess pain using appropriate pain scale  - Administer analgesics based on type and severity of pain and evaluate response  - Implement non-pharmacological measures as appropriate and evaluate response  - Consider cultural and social influences on pain and pain management  - Notify physician/advanced practitioner if interventions unsuccessful or patient reports new pain  Outcome: Progressing     Problem: INFECTION - ADULT  Goal: Absence or prevention of progression during hospitalization  Description: INTERVENTIONS:  - Assess and monitor for signs and symptoms of infection  - Monitor lab/diagnostic results  - Monitor all insertion sites, i.e. indwelling lines, tubes, and drains  - Monitor endotracheal if appropriate and nasal secretions for changes in amount and color  - [place] appropriate cooling/warming therapies per order  - Administer medications as ordered  - Instruct and encourage patient and family to use good hand hygiene technique  - Identify and instruct in appropriate isolation precautions for identified infection/condition  Outcome: Progressing  Goal: Absence of fever/infection during neutropenic period  Description: INTERVENTIONS:  - Monitor WBC    Outcome: Progressing     Problem: SAFETY ADULT  Goal: Patient will remain free of falls  Description: INTERVENTIONS:  - Educate patient/family on patient safety including physical limitations  - Instruct patient to call for assistance with activity   - Consult OT/PT to assist with strengthening/mobility   - Keep Call bell within reach  - Keep bed low and locked with side rails adjusted as appropriate  - Keep care items and personal belongings within reach  - Initiate and maintain comfort rounds  - Make Fall Risk Sign visible to staff  - Offer Toileting every 2 Hours, in advance of need  - Initiate/Maintain na alarm  - Obtain necessary fall risk management equipment: na  - Apply yellow socks and bracelet for high fall risk patients  - Consider moving patient to room near nurses station  Outcome: Progressing  Goal: Maintain or return to baseline ADL function  Description: INTERVENTIONS:  -  Assess patient's ability to carry out ADLs; assess patient's baseline for ADL function and identify physical deficits which impact ability to perform ADLs (bathing, care of mouth/teeth, toileting, grooming, dressing, etc.)  - Assess/evaluate cause of self-care deficits   - Assess range of motion  - Assess patient's mobility; develop plan if impaired  - Assess patient's need for assistive devices and provide as appropriate  - Encourage maximum independence but intervene and supervise when necessary  - Involve family in performance of ADLs  - Assess for home care needs following discharge   - Consider OT consult to assist with ADL evaluation and planning for discharge  - Provide patient education as appropriate  Outcome: Progressing  Goal: Maintains/Returns to pre admission functional level  Description: INTERVENTIONS:  - Perform BMAT or MOVE assessment daily.   - Set and communicate daily mobility goal to care team and patient/family/caregiver. - Collaborate with rehabilitation services on mobility goals if consulted  - Perform Range of Motion 2 times a day. - Reposition patient every 2 hours.   - Dangle patient 2 times a day  - Stand patient 2 times a day  - Ambulate patient 2 times a day  - Out of bed to chair 2 times a day   - Out of bed for meals 2 times a day  - Out of bed for toileting  - Record patient progress and toleration of activity level   Outcome: Progressing     Problem: DISCHARGE PLANNING  Goal: Discharge to home or other facility with appropriate resources  Description: INTERVENTIONS:  - Identify barriers to discharge w/patient and caregiver  - Arrange for needed discharge resources and transportation as appropriate  - Identify discharge learning needs (meds, wound care, etc.)  - Arrange for interpretive services to assist at discharge as needed  - Refer to Case Management Department for coordinating discharge planning if the patient needs post-hospital services based on physician/advanced practitioner order or complex needs related to functional status, cognitive ability, or social support system  Outcome: Progressing     Problem: Knowledge Deficit  Goal: Patient/family/caregiver demonstrates understanding of disease process, treatment plan, medications, and discharge instructions  Description: Complete learning assessment and assess knowledge base.   Interventions:  - Provide teaching at level of understanding  - Provide teaching via preferred learning methods  Outcome: Progressing     Problem: Potential for Falls  Goal: Patient will remain free of falls  Description: INTERVENTIONS:  - Educate patient/family on patient safety including physical limitations  - Instruct patient to call for assistance with activity   - Consult OT/PT to assist with strengthening/mobility   - Keep Call bell within reach  - Keep bed low and locked with side rails adjusted as appropriate  - Keep care items and personal belongings within reach  - Initiate and maintain comfort rounds  - Make Fall Risk Sign visible to staff  - Offer Toileting every 2 Hours, in advance of need  - Initiate/Maintain na alarm  - Obtain necessary fall risk management equipment: na  - Apply yellow socks and bracelet for high fall risk patients  - Consider moving patient to room near nurses station  Outcome: Progressing     Problem: MOBILITY - ADULT  Goal: Maintain or return to baseline ADL function  Description: INTERVENTIONS:  -  Assess patient's ability to carry out ADLs; assess patient's baseline for ADL function and identify physical deficits which impact ability to perform ADLs (bathing, care of mouth/teeth, toileting, grooming, dressing, etc.)  - Assess/evaluate cause of self-care deficits   - Assess range of motion  - Assess patient's mobility; develop plan if impaired  - Assess patient's need for assistive devices and provide as appropriate  - Encourage maximum independence but intervene and supervise when necessary  - Involve family in performance of ADLs  - Assess for home care needs following discharge   - Consider OT consult to assist with ADL evaluation and planning for discharge  - Provide patient education as appropriate  Outcome: Progressing  Goal: Maintains/Returns to pre admission functional level  Description: INTERVENTIONS:  - Perform BMAT or MOVE assessment daily.   - Set and communicate daily mobility goal to care team and patient/family/caregiver. - Collaborate with rehabilitation services on mobility goals if consulted  - Perform Range of Motion 2 times a day. - Reposition patient every 2 hours.   - Dangle patient 2 times a day  - Stand patient 2 times a day  - Ambulate patient 2 times a day  - Out of bed to chair 2 times a day   - Out of bed for meals 2 times a day  - Out of bed for toileting  - Record patient progress and toleration of activity level   Outcome: Progressing     Problem: Prexisting or High Potential for Compromised Skin Integrity  Goal: Skin integrity is maintained or improved  Description: INTERVENTIONS:  - Identify patients at risk for skin breakdown  - Assess and monitor skin integrity  - Assess and monitor nutrition and hydration status  - Monitor labs   - Assess for incontinence   - Turn and reposition patient  - Assist with mobility/ambulation  - Relieve pressure over bony prominences  - Avoid friction and shearing  - Provide appropriate hygiene as needed including keeping skin clean and dry  - Evaluate need for skin moisturizer/barrier cream  - Collaborate with interdisciplinary team   - Patient/family teaching  - Consider wound care consult   Outcome: Progressing     Problem: CARDIOVASCULAR - ADULT  Goal: Maintains optimal cardiac output and hemodynamic stability  Description: INTERVENTIONS:  - Monitor I/O, vital signs and rhythm  - Monitor for S/S and trends of decreased cardiac output  - Administer and titrate ordered vasoactive medications to optimize hemodynamic stability  - Assess quality of pulses, skin color and temperature  - Assess for signs of decreased coronary artery perfusion  - Instruct patient to report change in severity of symptoms  Outcome: Progressing  Goal: Absence of cardiac dysrhythmias or at baseline rhythm  Description: INTERVENTIONS:  - Continuous cardiac monitoring, vital signs, obtain 12 lead EKG if ordered  - Administer antiarrhythmic and heart rate control medications as ordered  - Monitor electrolytes and administer replacement therapy as ordered  Outcome: Progressing     Problem: RESPIRATORY - ADULT  Goal: Achieves optimal ventilation and oxygenation  Description: INTERVENTIONS:  - Assess for changes in respiratory status  - Assess for changes in mentation and behavior  - Position to facilitate oxygenation and minimize respiratory effort  - Oxygen administered by appropriate delivery if ordered  - Initiate smoking cessation education as indicated  - Encourage broncho-pulmonary hygiene including cough, deep breathe, Incentive Spirometry  - Assess the need for suctioning and aspirate as needed  - Assess and instruct to report SOB or any respiratory difficulty  - Respiratory Therapy support as indicated  Outcome: Progressing

## 2023-10-31 NOTE — ASSESSMENT & PLAN NOTE
COVID 19 positive in ED with 3 weeks of URI symptoms, given <3L, treating as mild  Criteria for mild COVID pathway based on 2 to 3 L of oxygen requirement on admission. Currently he is satting well ON ra  Continue with IV dexamethasone and remdesivir. Follow-up on inflammatory markers  Currently on heparin gtt.  for atrial fibrillation

## 2023-10-31 NOTE — PROGRESS NOTES
[address] [facility]  Progress Note  Name: Murel Schlatter  MRN: [id number]  Unit/Bed#: -01 I Date of Admission: 10/29/2023   Date of Service: 10/31/2023 I Hospital Day: 2    Assessment/Plan   * Atrial fibrillation [facility])  Assessment & Plan  Presented with NEW onset Atrial fibrillation with RVR in ED, in the  setting of COVID 19 section. [id number] of at least 2,   History of prior perioperative PE in setting of resection of lung cancer in remission, had been off anticoagulation after completing 6 months of treatment. [facility] Heart rates improved overall mid 90s to 118  On metoprolol 25 mg every 6 hours cardiology following adjustment to them  Continue with telemetry monitoring we will downgrade to [employer] telemetry  Continue with heparin GTT. Price check for Eliquis and subsequently transition to Eliquis 5 mg twice daily on discharge. TTE -EF 45-50  No plan for DC cardioversion per cardiology. Acute on chronic diastolic congestive heart failure [facility])  Assessment & Plan  Wt Readings from Last 3 Encounters:   10/30/23 (!) 148 kg (327 lb)   09/21/21 (!) 143 kg (316 lb)   03/23/21 (!) 142 kg (312 lb 6.4 oz)     Patient does have evidence of edema he does have rhonchi on his lungs. Weight gain he is on Lasix 20 mg p.o. daily he is EF on 2D echo is 45 to 50% with a mild to moderate AS. We will give additional Lasix 20 mg IV x1          COVID  Assessment & Plan  COVID 19 positive in ED with 3 weeks of URI symptoms, given <3L, treating as mild  Criteria for mild COVID pathway based on 2 to 3 L of oxygen requirement on admission. Currently he is satting well ON ra  Continue with IV dexamethasone and remdesivir. Follow-up on inflammatory markers  Currently on heparin gtt. for atrial fibrillation    Chronic obstructive pulmonary disease, unspecified ([address] [facility])  Assessment & Plan  Not on home O2 requirement, s/p lung mass resection, in remission  Does not appear to be in acute exacerbation.   - wean O2 for COVID as able  - continue prn oxygen  Currently he is satting well on room air    Essential (primary) hypertension  Assessment & Plan  Pressure control using metoprolol for A-fib Lasix    Malignant neoplasm of lower lobe, right bronchus or lung [facility])  Assessment & Plan  Status post RLL lobectomy in 2018  Follows with Oncology at [facility]  Follow-up outpatient CT scan per pulmonary/oncology recommendations. History of pulmonary embolism  Assessment & Plan  Prior PE in the setting of resection of lung mass,   Completed anticoagulation for 6 months. Now on heparin gtt. for new atrial fibrillation. VTE Pharmacologic Prophylaxis:   Moderate Risk (Score 3-4) - Pharmacological DVT Prophylaxis Ordered: heparin drip. Patient Centered Rounds: I performed bedside rounds with nursing staff today. Discussions with Specialists or Other Care Team Provider: will discuss with cards    Education and Discussions with Family / Patient: will update family    Total Time Spent on Date of Encounter in care of patient: >35 mins. This time was spent on one or more of the following: performing physical exam; counseling and coordination of care; obtaining or reviewing history; documenting in the medical record; reviewing/ordering tests, medications or procedures; communicating with other healthcare professionals and discussing with patient's family/caregivers. Current Length of Stay: 2 day(s)  Current Patient Status: Inpatient   Certification Statement: The patient will continue to require additional inpatient hospital stay due to afib with rvr  Discharge Plan: Anticipate discharge in 48-72 hrs to home.     Code Status: Level 1 - Full Code    Subjective:   Seen and examined breathing better    Objective:     Vitals:   Temp (24hrs), Av.7 °F (36.5 °C), Min:97.1 °F (36.2 °C), Max:98.2 °F (36.8 °C)    Temp:  [97.1 °F (36.2 °C)-98.2 °F (36.8 °C)] 97.8 °F (36.6 °C)  HR:  [] 95  Resp:  [17-22] 20  BP: (108-141)/() 134/87  SpO2:  [92 %-96 %] 96 %  Body mass index is 43.14 kg/m². Input and Output Summary (last 24 hours): Intake/Output Summary (Last 24 hours) at 10/31/2023 0937  Last data filed at 10/31/2023 0555  Gross per 24 hour   Intake 1468.35 ml   Output 225 ml   Net 1243.35 ml       Physical Exam:   Physical Exam  Vitals and nursing note reviewed. Constitutional:       General: He is not in acute distress. Appearance: He is well-developed. HENT:      Head: Normocephalic and atraumatic. Eyes:      Conjunctiva/sclera: Conjunctivae normal.   Cardiovascular:      Rate and Rhythm: Normal rate. Rhythm irregular. Heart sounds: No murmur heard. Pulmonary:      Effort: Pulmonary effort is normal. No respiratory distress. Breath sounds: Rhonchi present. Abdominal:      Palpations: Abdomen is soft. Tenderness: There is no abdominal tenderness. Musculoskeletal:         General: Swelling (+1) present. Cervical back: Neck supple. Skin:     General: Skin is warm and dry. Capillary Refill: Capillary refill takes less than 2 seconds. Neurological:      Mental Status: He is alert and oriented to person, place, and time.    Psychiatric:         Mood and Affect: Mood normal.          Additional Data:     Labs:  Results from last 7 days   Lab Units 10/31/23  0543   WBC Thousand/uL 10.39*   HEMOGLOBIN g/dL 14.8   HEMATOCRIT % 46.2   PLATELETS Thousands/uL 220   NEUTROS PCT % 82*   LYMPHS PCT % 11*   MONOS PCT % 7   EOS PCT % 0     Results from last 7 days   Lab Units 10/31/23  0532   SODIUM mmol/L 139   POTASSIUM mmol/L 4.4   CHLORIDE mmol/L 105   CO2 mmol/L 29   BUN mg/dL 23   CREATININE mg/dL 0.82   ANION GAP mmol/L 5   CALCIUM mg/dL 8.7   ALBUMIN g/dL 3.5   TOTAL BILIRUBIN mg/dL 1.10*   ALK PHOS U/L 45   ALT U/L 35   AST U/L 33   GLUCOSE RANDOM mg/dL 137     Results from last 7 days   Lab Units 10/29/23  0644   INR  1.17             Results from last 7 days   Lab Units 10/31/23  0532 10/29/23  0644   LACTIC ACID mmol/L  --  1.4   PROCALCITONIN ng/ml 0.08 0.18       Lines/Drains:  Invasive Devices       Peripheral Intravenous Line  Duration             Peripheral IV 10/29/23 Distal;Left;Upper;Ventral (anterior) Arm 2 days    Peripheral IV 10/29/23 Left;Ventral (anterior) Forearm 2 days    Peripheral IV 10/30/23 Right Antecubital 1 day                          Imaging: Reviewed radiology reports from this admission including: chest CT scan    Recent Cultures (last 7 days):   Results from last 7 days   Lab Units 10/29/23  0644   BLOOD CULTURE  No Growth at 24 hrs. No Growth at 24 hrs. Last 24 Hours Medication List:   Current Facility-Administered Medications   Medication Dose Route Frequency Provider Last Rate    albuterol  1.25 mg Nebulization Q6H PRN Kamryn Cabrera MD      artificial tear   Both Eyes HS PRN Kamryn Cabrera MD      aspirin  81 mg Oral Daily Kamryn Cabrera MD      atorvastatin  20 mg Oral Daily With Tawni Dance, MD      dexamethasone  6 mg Intravenous Q24H Dede Mckeon MD      furosemide  20 mg Intravenous Once Jeremiah Elaine MD      furosemide  20 mg Oral Daily Kamryn Cabrera MD      heparin (porcine)  3-30 Units/kg/hr (Order-Specific) Intravenous Titrated Dede Mckeon MD 11 Units/kg/hr (10/31/23 0630)    heparin (porcine)  10,000 Units Intravenous Q6H PRN Dede Mckeon MD      heparin (porcine)  5,000 Units Intravenous Q6H PRN Dede Mckeon MD      magnesium Oxide  400 mg Oral BID Kamryn Cabrera MD      metoprolol tartrate  25 mg Oral Q6H Bart Leong, DO      remdesivir  100 mg Intravenous Q24H Dede Mckeon MD          Today, Patient Was Seen By: Jeremiah Elaine MD    **Please Note: This note may have been constructed using a voice recognition system. **

## 2023-10-31 NOTE — ASSESSMENT & PLAN NOTE
Presented with NEW onset Atrial fibrillation with RVR in ED, in the  setting of COVID 19 section. [id number] of at least 2,   History of prior perioperative PE in setting of resection of lung cancer in remission, had been off anticoagulation after completing 6 months of treatment. [facility] Heart rates improved overall mid 90s to 118  On metoprolol 25 mg every 6 hours cardiology following adjustment to them  Continue with telemetry monitoring we will downgrade to [employer] telemetry  Continue with heparin GTT. Price check for Eliquis and subsequently transition to Eliquis 5 mg twice daily on discharge. TTE -EF 45-50  No plan for DC cardioversion per cardiology.

## 2023-10-31 NOTE — ASSESSMENT & PLAN NOTE
Wt Readings from Last 3 Encounters:   10/30/23 (!) 148 kg (327 lb)   09/21/21 (!) 143 kg (316 lb)   03/23/21 (!) 142 kg (312 lb 6.4 oz)     Patient does have evidence of edema he does have rhonchi on his lungs. Weight gain he is on Lasix 20 mg p.o. daily he is EF on 2D echo is 45 to 50% with a mild to moderate AS.   We will give additional Lasix 20 mg IV x1

## 2023-10-31 NOTE — ASSESSMENT & PLAN NOTE
Status post RLL lobectomy in 12/2018  Follows with Oncology at [facility]  Follow-up outpatient CT scan per pulmonary/oncology recommendations.

## 2023-10-31 NOTE — PROGRESS NOTES
Progress Note - Cardiology   Griffin Shelton 75 y.o. male MRN: [id number]  Unit/Bed#: -01 Encounter: [id number]    Assessment:  New Onset AFIB with RVR  Acute COVID infection  Mild to moderate CAD by cath 2023  Cardiomyopathy- mild EF 45-50% (Difficult study images) - possibly mild TICM  Mild - Moderate AS  Chronic HFpEF on daily lasix  COPD - not on home O2  H/x PE in setting of lung mass removal  H/x [place] CA - surgery, chemo  Former smoker      Plan:  Continue with rate control therapy and anticoagulation for now  Not appear to be any significant CHF at this time. Titrate metoprolol to 50 mg every 6 hours orally-heart rates improving, but still tachycardic in general  Would rec transition to oral Eliquis 5mg bid when able  No plan for DCCV st this time. Rate control for now. Hopefully with improvement in his breathing/infectious status he may return to NSR. Pt does not currently notice his AFIB. Can continue low-dose oral diuretic as needed    Subjective/Objective       Subjective: Patient notes he is feeling better today. Significantly less shortness of breath. Denies chest pain or palpitations.         Vitals: /98 (BP Location: Right arm)   Pulse 94   Temp 97.7 °F (36.5 °C) (Oral)   Resp 21   Ht 6' 1" (1.854 m)   Wt (!) 148 kg (327 lb)   SpO2 94%   BMI 43.14 kg/m²   Vitals:    10/29/23 0630 10/30/23 0830   Weight: (!) 149 kg (327 lb 13.2 oz) (!) 148 kg (327 lb)     Orthostatic Blood Pressures      Flowsheet Row Most Recent Value   Blood Pressure 141/98 filed at 10/31/2023 0300   Patient Position - Orthostatic VS Lying filed at 10/31/2023 0300              Intake/Output Summary (Last 24 hours) at 10/31/2023 0821  Last data filed at 10/31/2023 0555  Gross per 24 hour   Intake 1768.35 ml   Output 225 ml   Net 1543.35 ml       Invasive Devices       Peripheral Intravenous Line  Duration             Peripheral IV 10/29/23 Distal;Left;Upper;Ventral (anterior) Arm 1 day    Peripheral IV 10/29/23 Left;Ventral (anterior) Forearm 1 day    Peripheral IV 10/30/23 Right Antecubital <1 day                        Physical Exam:   General: No acute distress  Neck: Soft, supple, no JVD appreciated, no carotid bruits appreciated  Cardiovascular tachycardic, irregular irregular rhythm, early MATTHEW across precordium  Lungs: Mildly diminished breath sounds globally, breath sounds are coarse  Abdomen: Soft, obese, nontender, positive bowel sounds  Extremities: Minimal lower extreme edema with varicosities      Lab Results: I have personally reviewed pertinent lab results. CBC with diff:   Results from last 7 days   Lab Units 10/31/23  0543   WBC Thousand/uL 10.39*   RBC Million/uL 4.76   HEMOGLOBIN g/dL 14.8   HEMATOCRIT % 46.2   MCV fL 97   MCH pg 31.1   MCHC g/dL 32.0   RDW % 13.3   MPV fL 9.8   PLATELETS Thousands/uL 220     CMP:   Results from last 7 days   Lab Units 10/31/23  0532   SODIUM mmol/L 139   CHLORIDE mmol/L 105   CO2 mmol/L 29   BUN mg/dL 23   CREATININE mg/dL 0.82   CALCIUM mg/dL 8.7   AST U/L 33   ALT U/L 35   ALK PHOS U/L 45   EGFR ml/min/1.73sq m 87     HS Troponin:   0   Lab Value Date/Time    HSTNI0 29 10/29/2023 0644    HSTNI2 27 10/29/2023 0840    HSTNI4 24 10/29/2023 1024     BNP:   Results from last 7 days   Lab Units 10/31/23  0532   POTASSIUM mmol/L 4.4   CHLORIDE mmol/L 105   CO2 mmol/L 29   BUN mg/dL 23   CREATININE mg/dL 0.82   CALCIUM mg/dL 8.7   EGFR ml/min/1.73sq m 87     Imaging: I have personally reviewed pertinent reports. EKG: Atrial fibrillation with rapid ventricular response with premature ventricular or aberrantly conducted complexes  Low voltage QRS  Nonspecific ST abnormality  Abnormal ECG    Telemetry: Reviewed-atrial fibrillation with improving heart rate control but still tachycardic    ECHO:  Limited and very technically difficult study    Left Ventricle: Left ventricular cavity size is normal. Wall thickness is mildly increased.  The left ventricular ejection fraction is 45-50% by visual estimation. . Systolic function is mildly reduced. Unable to assess complete regional wall motion given loack of endocardial definition. Pt has allergy to Definity contrast    Left Atrium: The atrium is mildly dilated. Aortic Valve: The leaflets are moderately calcified. Unable to assess aortic valve stenosis due to poor Doppler exam.    Mitral Valve: There is mild regurgitation. Aorta: The aortic root is mildly dilated. IVC/SVC: The inferior vena cava is normal in size. Respirophasic changes were normal.      Counseling / Coordination of Care  Total time spent today 30 minutes. Greater than 50% of total time was spent with the patient and / or family counseling and / or coordination of care.

## 2023-10-31 NOTE — ASSESSMENT & PLAN NOTE
Not on home O2 requirement, s/p lung mass resection, in remission  Does not appear to be in acute exacerbation.   - wean O2 for COVID as able  - continue prn oxygen  Currently he is satting well on room air

## 2023-11-01 LAB
ALBUMIN SERPL BCP-MCNC: 3.7 G/DL (ref 3.5–5)
ALP SERPL-CCNC: 46 U/L (ref 34–104)
ALT SERPL W P-5'-P-CCNC: 37 U/L (ref 7–52)
ANION GAP SERPL CALCULATED.3IONS-SCNC: 5 MMOL/L
APTT PPP: 122 SECONDS (ref 23–37)
APTT PPP: 54 SECONDS (ref 23–37)
APTT PPP: 70 SECONDS (ref 23–37)
APTT PPP: 72 SECONDS (ref 23–37)
AST SERPL W P-5'-P-CCNC: 29 U/L (ref 13–39)
BILIRUB SERPL-MCNC: 1.18 MG/DL (ref 0.2–1)
BUN SERPL-MCNC: 31 MG/DL (ref 5–25)
CALCIUM SERPL-MCNC: 9 MG/DL (ref 8.4–10.2)
CHLORIDE SERPL-SCNC: 102 MMOL/L (ref 96–108)
CO2 SERPL-SCNC: 31 MMOL/L (ref 21–32)
CREAT SERPL-MCNC: 0.97 MG/DL (ref 0.6–1.3)
GFR SERPL CREATININE-BSD FRML MDRD: 76 ML/MIN/1.73SQ M
GLUCOSE SERPL-MCNC: 131 MG/DL (ref 65–140)
POTASSIUM SERPL-SCNC: 4.2 MMOL/L (ref 3.5–5.3)
PROT SERPL-MCNC: 6.8 G/DL (ref 6.4–8.4)
SODIUM SERPL-SCNC: 138 MMOL/L (ref 135–147)

## 2023-11-01 PROCEDURE — 94640 AIRWAY INHALATION TREATMENT: CPT

## 2023-11-01 PROCEDURE — 85730 THROMBOPLASTIN TIME PARTIAL: CPT | Performed by: FAMILY MEDICINE

## 2023-11-01 PROCEDURE — 80053 COMPREHEN METABOLIC PANEL: CPT | Performed by: FAMILY MEDICINE

## 2023-11-01 PROCEDURE — 94760 N-INVAS EAR/PLS OXIMETRY 1: CPT

## 2023-11-01 PROCEDURE — 99232 SBSQ HOSP IP/OBS MODERATE 35: CPT | Performed by: FAMILY MEDICINE

## 2023-11-01 RX ORDER — METOPROLOL TARTRATE 50 MG/1
50 TABLET, FILM COATED ORAL ONCE
Status: COMPLETED | OUTPATIENT
Start: 2023-11-01 | End: 2023-11-01

## 2023-11-01 RX ORDER — METOPROLOL TARTRATE 50 MG/1
100 TABLET, FILM COATED ORAL EVERY 12 HOURS SCHEDULED
Status: DISCONTINUED | OUTPATIENT
Start: 2023-11-01 | End: 2023-11-01

## 2023-11-01 RX ORDER — ALBUTEROL SULFATE 90 UG/1
2 AEROSOL, METERED RESPIRATORY (INHALATION) EVERY 4 HOURS PRN
Status: DISCONTINUED | OUTPATIENT
Start: 2023-11-01 | End: 2023-11-03 | Stop reason: HOSPADM

## 2023-11-01 RX ORDER — LEVALBUTEROL INHALATION SOLUTION 0.63 MG/3ML
0.63 SOLUTION RESPIRATORY (INHALATION)
Status: DISCONTINUED | OUTPATIENT
Start: 2023-11-01 | End: 2023-11-01

## 2023-11-01 RX ORDER — IPRATROPIUM BROMIDE AND ALBUTEROL SULFATE 2.5; .5 MG/3ML; MG/3ML
3 SOLUTION RESPIRATORY (INHALATION)
Status: DISCONTINUED | OUTPATIENT
Start: 2023-11-01 | End: 2023-11-01

## 2023-11-01 RX ORDER — METOPROLOL TARTRATE 50 MG/1
100 TABLET, FILM COATED ORAL EVERY 12 HOURS SCHEDULED
Status: DISCONTINUED | OUTPATIENT
Start: 2023-11-01 | End: 2023-11-02

## 2023-11-01 RX ORDER — BUDESONIDE 0.5 MG/2ML
0.5 INHALANT ORAL
Status: DISCONTINUED | OUTPATIENT
Start: 2023-11-01 | End: 2023-11-01

## 2023-11-01 RX ADMIN — REMDESIVIR 100 MG: 100 INJECTION, POWDER, LYOPHILIZED, FOR SOLUTION INTRAVENOUS at 10:28

## 2023-11-01 RX ADMIN — ATORVASTATIN CALCIUM 20 MG: 20 TABLET, FILM COATED ORAL at 16:10

## 2023-11-01 RX ADMIN — METOPROLOL TARTRATE 50 MG: 50 TABLET, FILM COATED ORAL at 04:56

## 2023-11-01 RX ADMIN — Medication 400 MG: at 17:11

## 2023-11-01 RX ADMIN — HEPARIN SODIUM 13 UNITS/KG/HR: 10000 INJECTION, SOLUTION INTRAVENOUS at 17:11

## 2023-11-01 RX ADMIN — FUROSEMIDE 20 MG: 20 TABLET ORAL at 08:16

## 2023-11-01 RX ADMIN — DEXAMETHASONE SODIUM PHOSPHATE 6 MG: 10 INJECTION, SOLUTION INTRAMUSCULAR; INTRAVENOUS at 08:16

## 2023-11-01 RX ADMIN — LEVALBUTEROL HYDROCHLORIDE 0.63 MG: 0.63 SOLUTION RESPIRATORY (INHALATION) at 08:49

## 2023-11-01 RX ADMIN — ALBUTEROL SULFATE 1.25 MG: 2.5 SOLUTION RESPIRATORY (INHALATION) at 05:16

## 2023-11-01 RX ADMIN — Medication 400 MG: at 08:16

## 2023-11-01 RX ADMIN — METOPROLOL TARTRATE 50 MG: 50 TABLET, FILM COATED ORAL at 14:30

## 2023-11-01 RX ADMIN — BUDESONIDE 0.5 MG: 0.5 INHALANT RESPIRATORY (INHALATION) at 08:49

## 2023-11-01 RX ADMIN — METOPROLOL TARTRATE 50 MG: 50 TABLET, FILM COATED ORAL at 10:28

## 2023-11-01 RX ADMIN — HEPARIN SODIUM 5000 UNITS: 1000 INJECTION INTRAVENOUS; SUBCUTANEOUS at 16:13

## 2023-11-01 RX ADMIN — FLUTICASONE FUROATE 1 PUFF: 100 POWDER RESPIRATORY (INHALATION) at 12:00

## 2023-11-01 RX ADMIN — UMECLIDINIUM 1 PUFF: 62.5 AEROSOL, POWDER ORAL at 12:00

## 2023-11-01 RX ADMIN — METOPROLOL TARTRATE 100 MG: 50 TABLET, FILM COATED ORAL at 20:49

## 2023-11-01 RX ADMIN — ASPIRIN 81 MG 81 MG: 81 TABLET ORAL at 08:16

## 2023-11-01 NOTE — ASSESSMENT & PLAN NOTE
Status post RLL lobectomy in 12/2018  Follows with Oncology at [facility]  Follow-up outpatient CT scan per pulmonary/oncology recommendations.

## 2023-11-01 NOTE — CASE MANAGEMENT
Case Management Discharge Planning Note    Patient name Latrell Del Valle  Location /-42 MRN [id number]  : 1950 Date 2023       Current Admission Date: 10/29/2023  Current Admission Diagnosis:Atrial fibrillation [facility])   Patient Active Problem List    Diagnosis Date Noted    Acute on chronic diastolic congestive heart failure ([address]) 10/31/2023    Lung mass 10/29/2023    COVID 10/29/2023    Atrial fibrillation ([address]) 10/29/2023    Morbid obesity ([address]) 2021    Chronic bronchitis ([address]) 2021    Mixed simple and mucopurulent chronic bronchitis ([address]) 2019    Hypoxemia requiring supplemental oxygen 2019    History of pulmonary embolism 2019    Malignant neoplasm of lower lobe, right bronchus or lung ([address]) 2019    Essential (primary) hypertension 2016    Chronic obstructive pulmonary disease, unspecified ([address]) 2012      LOS (days): 3  Geometric Mean LOS (GMLOS) (days): 5.00  Days to GMLOS:1.7     OBJECTIVE:  Risk of Unplanned Readmission Score: 14.45         Current admission status: Inpatient   Preferred Pharmacy:   [address] [place] [address]   [address] [place] [address]  Phone: [phone] Fax: [phone]    Primary Care Provider: Elwood Severin, DO    Primary Insurance: MEDICARE  Secondary Insurance: [facility]    DISCHARGE DETAILS:        CM provided Eliquis coupon to bedside nurse for spouse to  patients medications today in anticipation of discharge tomorrow.

## 2023-11-01 NOTE — PROGRESS NOTES
Progress Note - Cardiology   Gifty Crawford 75 y.o. male MRN: [id number]  Unit/Bed#: -01 Encounter: [id number]    Assessment:  New Onset AFIB with RVR  Acute COVID infection  Mild to moderate CAD by cath 2023  Cardiomyopathy- mild EF 45-50% (Difficult study images) - possibly mild TICM  Mild - Moderate AS  Chronic HFpEF on daily lasix  COPD - not on home O2  H/x PE in setting of lung mass removal  H/x [place] CA - surgery, chemo  Former smoker      Plan:  Continue with rate control therapy and anticoagulation for now  Not appear to be any significant CHF at this time. Change metoprolol to 100mg bid - overall HR better  Would rec transition to oral Eliquis 5mg bid when able (if too expensive, could transition to warfarin and use lovenox bridge if needed)  No plan for DCCV st this time. Rate control for now. Hopefully with improvement in his breathing/infectious status he may return to NSR. Pt does not currently notice his AFIB. Can continue low-dose oral diuretic. Could do outpt ZIO and follow up with [place] cardiologist to determine if PAF or persistant and need for outpt DCCV    Subjective/Objective       Subjective: Patient notes he is feeling better today. Breathing feels near baseline Denies chest pain or palpitations.  + cough with phlegm        Vitals: /87 (BP Location: Right arm)   Pulse 81   Temp 97.8 °F (36.6 °C) (Oral)   Resp (!) 35   Ht 6' 1" (1.854 m)   Wt (!) 148 kg (327 lb)   SpO2 94%   BMI 43.14 kg/m²   Vitals:    10/29/23 0630 10/30/23 0830   Weight: (!) 149 kg (327 lb 13.2 oz) (!) 148 kg (327 lb)     Orthostatic Blood Pressures      Flowsheet Row Most Recent Value   Blood Pressure 137/87 filed at 11/01/2023 0815   Patient Position - Orthostatic VS Sitting filed at 11/01/2023 0815              Intake/Output Summary (Last 24 hours) at 11/1/2023 0853  Last data filed at 11/1/2023 0601  Gross per 24 hour   Intake 336.87 ml   Output --   Net 336.87 ml       Invasive Devices Peripheral Intravenous Line  Duration             Peripheral IV 10/29/23 Distal;Left;Upper;Ventral (anterior) Arm 2 days    Peripheral IV 10/29/23 Left;Ventral (anterior) Forearm 2 days    Peripheral IV 10/30/23 Right Antecubital 1 day                        Physical Exam:   General: No acute distress  Neck: Soft, supple, no JVD appreciated, no carotid bruits appreciated  Cardiovascular : irregular irregular rhythm, early MATTHEW across precordium  Lungs: Mildly diminished breath sounds globally, breath sounds are coarse, mild wheeze  Abdomen: Soft, obese, nontender, positive bowel sounds  Extremities: Minimal lower extreme edema with varicosities      Lab Results: I have personally reviewed pertinent lab results. CBC with diff:   Results from last 7 days   Lab Units 10/31/23  0543   WBC Thousand/uL 10.39*   RBC Million/uL 4.76   HEMOGLOBIN g/dL 14.8   HEMATOCRIT % 46.2   MCV fL 97   MCH pg 31.1   MCHC g/dL 32.0   RDW % 13.3   MPV fL 9.8   PLATELETS Thousands/uL 220     CMP:   Results from last 7 days   Lab Units 11/01/23  0500   SODIUM mmol/L 138   CHLORIDE mmol/L 102   CO2 mmol/L 31   BUN mg/dL 31*   CREATININE mg/dL 0.97   CALCIUM mg/dL 9.0   AST U/L 29   ALT U/L 37   ALK PHOS U/L 46   EGFR ml/min/1.73sq m 76     HS Troponin:   0   Lab Value Date/Time    HSTNI0 29 10/29/2023 0644    HSTNI2 27 10/29/2023 0840    HSTNI4 24 10/29/2023 1024     BNP:   Results from last 7 days   Lab Units 11/01/23  0500   POTASSIUM mmol/L 4.2   CHLORIDE mmol/L 102   CO2 mmol/L 31   BUN mg/dL 31*   CREATININE mg/dL 0.97   CALCIUM mg/dL 9.0   EGFR ml/min/1.73sq m 76     Imaging: I have personally reviewed pertinent reports.     EKG: Atrial fibrillation with rapid ventricular response with premature ventricular or aberrantly conducted complexes  Low voltage QRS  Nonspecific ST abnormality  Abnormal ECG    Telemetry: Reviewed-atrial fibrillation with improving heart rate control but still tachycardic    ECHO:  Limited and very technically difficult study    Left Ventricle: Left ventricular cavity size is normal. Wall thickness is mildly increased. The left ventricular ejection fraction is 45-50% by visual estimation. . Systolic function is mildly reduced. Unable to assess complete regional wall motion given loack of endocardial definition. Pt has allergy to Definity contrast    Left Atrium: The atrium is mildly dilated. Aortic Valve: The leaflets are moderately calcified. Unable to assess aortic valve stenosis due to poor Doppler exam.    Mitral Valve: There is mild regurgitation. Aorta: The aortic root is mildly dilated. IVC/SVC: The inferior vena cava is normal in size. Respirophasic changes were normal.      Counseling / Coordination of Care  Total time spent today 30 minutes. Greater than 50% of total time was spent with the patient and / or family counseling and / or coordination of care.

## 2023-11-01 NOTE — ASSESSMENT & PLAN NOTE
Presented with NEW onset Atrial fibrillation with RVR in ED, in the  setting of COVID 19 section. [id number] of at least 2,   History of prior perioperative PE in setting of resection of lung cancer in remission, had been off anticoagulation after completing 6 months of treatment. .   Continue with heparin GTT. Price check for Eliquis and subsequently transition to Eliquis 5 mg twice daily on discharge. Discussed with wife and patient they are in agreement to be with a co-pay  TTE -EF 45-50  No plan for DC cardioversion per cardiology.   Rates are better cardiology has changed metoprolol to 100 mg p.o. twice daily rate continue to be controlled will discharge home tomorrow

## 2023-11-01 NOTE — ASSESSMENT & PLAN NOTE
Not on home O2 requirement, s/p lung mass resection, in remission  Mild exacerbation some rhonchi and wheezing intermittently on room air placed him on inhaled steroid and encruse albuterol hfa  He is on steroids

## 2023-11-01 NOTE — ASSESSMENT & PLAN NOTE
Wt Readings from Last 3 Encounters:   10/30/23 (!) 148 kg (327 lb)   09/21/21 (!) 143 kg (316 lb)   03/23/21 (!) 142 kg (312 lb 6.4 oz)     Patient does have evidence of edema he does have rhonchi on his lungs. Weight gain he is on Lasix 20 mg p.o. daily he is EF on 2D echo is 45 to 50% with a mild to moderate AS. Status post additional Lasix dose given yesterday.   Is improved

## 2023-11-01 NOTE — RESPIRATORY THERAPY NOTE
RT Protocol Note  Richard Richard 75 y.o. male MRN: [id number]  Unit/Bed#: -01 Encounter: [id number]    Assessment    Principal Problem:    Atrial fibrillation ([address])  Active Problems:    History of pulmonary embolism    Malignant neoplasm of lower lobe, right bronchus or lung (HCC)    Essential (primary) hypertension    Chronic obstructive pulmonary disease, unspecified ([address])    COVID    Acute on chronic diastolic congestive heart failure (HCC)      Home Pulmonary Medications:         Past Medical History:   Diagnosis Date    Arthritis     (L) hip    Cataract     PHIL    COPD (chronic obstructive pulmonary disease) (HCC)     Hernia of abdominal wall     Hip joint pain     (L)    [place] (hard of hearing)     phil hearing aides    Hypertension     Macular degeneration     phil    Psoriasis     elbows and ankles- small  red open areas left ankle    Pulmonary emphysema (HCC)     Wears glasses      Social History     Socioeconomic History    Marital status: /Civil Union     Spouse name: None    Number of children: None    Years of education: None    Highest education level: None   Occupational History    None   Tobacco Use    Smoking status: Former     Packs/day: 2.00     Years: 40.00     Total pack years: 80.00     Types: Cigarettes     Quit date: 1/20/2013     Years since quitting: 10.7    Smokeless tobacco: Never   Vaping Use    Vaping Use: Never used   Substance and Sexual Activity    Alcohol use: Not Currently     Comment: 1x year    Drug use: No    Sexual activity: None   Other Topics Concern    None   Social History Narrative    None     Social Determinants of Health     Financial Resource Strain: Not on file   Food Insecurity: Not on file   Transportation Needs: Not on file   Physical Activity: Not on file   Stress: Not on file   Social Connections: Not on file   Intimate Partner Violence: Not on file   Housing Stability: Not on file       Subjective         Objective    Physical Exam: Vitals:  Blood pressure 137/87, pulse 81, temperature 97.8 °F (36.6 °C), temperature source Oral, resp. rate (!) 35, height 6' 1" (1.854 m), weight (!) 148 kg (327 lb), SpO2 94 %. Imaging and other studies: I have personally reviewed pertinent reports.             Plan             Resp Comments: (P) pt is stable on RA   albuterol used prn at home maybe 4xs weekly   xopenex being chnaged to prn albuterol inhaler and service to change pulmicort to inhaled steroid

## 2023-11-01 NOTE — PLAN OF CARE
Problem: PAIN - ADULT  Goal: Verbalizes/displays adequate comfort level or baseline comfort level  Description: Interventions:  - Encourage patient to monitor pain and request assistance  - Assess pain using appropriate pain scale  - Administer analgesics based on type and severity of pain and evaluate response  - Implement non-pharmacological measures as appropriate and evaluate response  - Consider cultural and social influences on pain and pain management  - Notify physician/advanced practitioner if interventions unsuccessful or patient reports new pain  Outcome: Progressing     Problem: INFECTION - ADULT  Goal: Absence or prevention of progression during hospitalization  Description: INTERVENTIONS:  - Assess and monitor for signs and symptoms of infection  - Monitor lab/diagnostic results  - Monitor all insertion sites, i.e. indwelling lines, tubes, and drains  - Monitor endotracheal if appropriate and nasal secretions for changes in amount and color  - [place] appropriate cooling/warming therapies per order  - Administer medications as ordered  - Instruct and encourage patient and family to use good hand hygiene technique  - Identify and instruct in appropriate isolation precautions for identified infection/condition  Outcome: Progressing  Goal: Absence of fever/infection during neutropenic period  Description: INTERVENTIONS:  - Monitor WBC    Outcome: Progressing     Problem: SAFETY ADULT  Goal: Maintains/Returns to pre admission functional level  Description: INTERVENTIONS:  - Perform BMAT or MOVE assessment daily.   - Set and communicate daily mobility goal to care team and patient/family/caregiver. - Collaborate with rehabilitation services on mobility goals if consulted  - Perform Range of Motion 2 times a day. - Reposition patient every 2 hours.   - Dangle patient 2 times a day  - Stand patient 2 times a day  - Ambulate patient 2 times a day  - Out of bed to chair 2 times a day   - Out of bed for meals 2 times a day  - Out of bed for toileting  - Record patient progress and toleration of activity level   Outcome: Progressing

## 2023-11-01 NOTE — PROGRESS NOTES
[facility]  Progress Note  Name: Maryellen Hager  MRN: [id number]  Unit/Bed#: -01 I Date of Admission: 10/29/2023   Date of Service: 11/1/2023 I Hospital Day: 3    Assessment/Plan   * Atrial fibrillation [facility])  Assessment & Plan  Presented with NEW onset Atrial fibrillation with RVR in ED, in the  setting of COVID 19 section. [id number] of at least 2,   History of prior perioperative PE in setting of resection of lung cancer in remission, had been off anticoagulation after completing 6 months of treatment. .   Continue with heparin GTT. Price check for Eliquis and subsequently transition to Eliquis 5 mg twice daily on discharge. Discussed with wife and patient they are in agreement to be with a co-pay  TTE -EF 45-50  No plan for DC cardioversion per cardiology. Rates are better cardiology has changed metoprolol to 100 mg p.o. twice daily rate continue to be controlled will discharge home tomorrow    Acute on chronic diastolic congestive heart failure [facility])  Assessment & Plan  Wt Readings from Last 3 Encounters:   10/30/23 (!) 148 kg (327 lb)   09/21/21 (!) 143 kg (316 lb)   03/23/21 (!) 142 kg (312 lb 6.4 oz)     Patient does have evidence of edema he does have rhonchi on his lungs. Weight gain he is on Lasix 20 mg p.o. daily he is EF on 2D echo is 45 to 50% with a mild to moderate AS. Status post additional Lasix dose given yesterday. Is improved        COVID  Assessment & Plan  COVID 19 positive in ED with 3 weeks of URI symptoms, given <3L, treating as mild  Criteria for mild COVID pathway based on 2 to 3 L of oxygen requirement on admission. Currently he is satting well ON ra  Continue with IV dexamethasone and remdesivir. Follow-up on inflammatory markers  Currently on heparin gtt.  for atrial fibrillation    Chronic obstructive pulmonary disease, unspecified ([address])  Assessment & Plan  Not on home O2 requirement, s/p lung mass resection, in remission  Mild exacerbation some rhonchi and wheezing intermittently on room air placed him on inhaled steroid and encruse albuterol hfa  He is on steroids    Essential (primary) hypertension  Assessment & Plan  Pressure control using metoprolol for A-fib Lasix    Malignant neoplasm of lower lobe, right bronchus or lung [facility])  Assessment & Plan  Status post RLL lobectomy in 2018  Follows with Oncology at [facility]  Follow-up outpatient CT scan per pulmonary/oncology recommendations. History of pulmonary embolism  Assessment & Plan  Prior PE in the setting of resection of lung mass,   Completed anticoagulation for 6 months. Now on heparin gtt. for new atrial fibrillation. VTE Pharmacologic Prophylaxis:   Moderate Risk (Score 3-4) - Pharmacological DVT Prophylaxis Ordered: heparin drip. Patient Centered Rounds: I performed bedside rounds with nursing staff today. Discussions with Specialists or Other Care Team Provider: will discuss with cards    Education and Discussions with Family / Patient: pt will update wife    Total Time Spent on Date of Encounter in care of patient: >35 mins. This time was spent on one or more of the following: performing physical exam; counseling and coordination of care; obtaining or reviewing history; documenting in the medical record; reviewing/ordering tests, medications or procedures; communicating with other healthcare professionals and discussing with patient's family/caregivers. Current Length of Stay: 3 day(s)  Current Patient Status: Inpatient   Certification Statement: The patient will continue to require additional inpatient hospital stay due to afib with rvr  Discharge Plan: Anticipate discharge tomorrow to home.     Code Status: Level 1 - Full Code    Subjective:   Seen and examined no cp or sob cough with white sputum    Objective:     Vitals:   Temp (24hrs), Av.9 °F (36.6 °C), Min:97.8 °F (36.6 °C), Max:98.2 °F (36.8 °C)    Temp:  [97.8 °F (36.6 °C)-98.2 °F (36.8 °C)] 97.8 °F (36.6 °C)  HR:  [79-96] 81  Resp:  [18-35] 35  BP: (132-144)/(61-89) 137/87  SpO2:  [91 %-94 %] 94 %  Body mass index is 43.14 kg/m². Input and Output Summary (last 24 hours): Intake/Output Summary (Last 24 hours) at 11/1/2023 1121  Last data filed at 11/1/2023 0901  Gross per 24 hour   Intake 571.81 ml   Output --   Net 571.81 ml       Physical Exam:   Physical Exam  Vitals and nursing note reviewed. Constitutional:       General: He is not in acute distress. Appearance: He is well-developed. HENT:      Head: Normocephalic and atraumatic. Eyes:      Conjunctiva/sclera: Conjunctivae normal.   Cardiovascular:      Rate and Rhythm: Normal rate. Rhythm irregular. Heart sounds: No murmur heard. Pulmonary:      Effort: Pulmonary effort is normal. No respiratory distress. Breath sounds: Rhonchi present. No rales. Abdominal:      General: There is no distension. Palpations: Abdomen is soft. Tenderness: There is no abdominal tenderness. Musculoskeletal:         General: Swelling (+1) present. Cervical back: Neck supple. Skin:     General: Skin is warm and dry. Capillary Refill: Capillary refill takes less than 2 seconds. Neurological:      Mental Status: He is alert and oriented to person, place, and time.    Psychiatric:         Mood and Affect: Mood normal.          Additional Data:     Labs:  Results from last 7 days   Lab Units 10/31/23  0543   WBC Thousand/uL 10.39*   HEMOGLOBIN g/dL 14.8   HEMATOCRIT % 46.2   PLATELETS Thousands/uL 220   NEUTROS PCT % 82*   LYMPHS PCT % 11*   MONOS PCT % 7   EOS PCT % 0     Results from last 7 days   Lab Units 11/01/23  0500   SODIUM mmol/L 138   POTASSIUM mmol/L 4.2   CHLORIDE mmol/L 102   CO2 mmol/L 31   BUN mg/dL 31*   CREATININE mg/dL 0.97   ANION GAP mmol/L 5   CALCIUM mg/dL 9.0   ALBUMIN g/dL 3.7   TOTAL BILIRUBIN mg/dL 1.18*   ALK PHOS U/L 46   ALT U/L 37   AST U/L 29   GLUCOSE RANDOM mg/dL 131     Results from last 7 days   Lab Units 10/29/23  0644   INR  1.17             Results from last 7 days   Lab Units 10/31/23  0532 10/29/23  0644   LACTIC ACID mmol/L  --  1.4   PROCALCITONIN ng/ml 0.08 0.18       Lines/Drains:  Invasive Devices       Peripheral Intravenous Line  Duration             Peripheral IV 10/29/23 Distal;Left;Upper;Ventral (anterior) Arm 3 days    Peripheral IV 10/29/23 Left;Ventral (anterior) Forearm 3 days    Peripheral IV 10/30/23 Right Antecubital 2 days                          Imaging: Reviewed radiology reports from this admission including: chest CT scan    Recent Cultures (last 7 days):   Results from last 7 days   Lab Units 10/29/23  0644   BLOOD CULTURE  No Growth at 48 hrs. No Growth at 48 hrs.        Last 24 Hours Medication List:   Current Facility-Administered Medications   Medication Dose Route Frequency Provider Last Rate    albuterol  2 puff Inhalation Q4H PRN Arleen Cintron MD      artificial tear   Both Eyes HS PRN Renetta Jose MD      aspirin  81 mg Oral Daily Renetta Jose MD      atorvastatin  20 mg Oral Daily With Neema Darling MD      dexamethasone  6 mg Intravenous Q24H Ana Funse MD      fluticasone  1 puff Inhalation Daily Arleen Cintron MD      furosemide  20 mg Oral Daily Renetta Jose MD      heparin (porcine)  3-30 Units/kg/hr (Order-Specific) Intravenous Titrated Ana Funes MD 11 Units/kg/hr (11/01/23 0140)    heparin (porcine)  10,000 Units Intravenous Q6H PRN Ana Funes MD      heparin (porcine)  5,000 Units Intravenous Q6H PRN Ana Funes MD      magnesium Oxide  400 mg Oral BID Renetta Jose MD      metoprolol tartrate  100 mg Oral Q12H [address] [facility],       remdesivir  100 mg Intravenous Q24H Ana Funes MD      umeclidinium  1 puff Inhalation Daily Arleen Cintron MD          Today, Patient Was Seen By: Arleen Cintron MD    **Please Note: This note may have been constructed using a voice recognition system. **

## 2023-11-02 LAB
ANION GAP SERPL CALCULATED.3IONS-SCNC: 8 MMOL/L
APTT PPP: 82 SECONDS (ref 23–37)
BASOPHILS # BLD AUTO: 0.01 THOUSANDS/ÂΜL (ref 0–0.1)
BASOPHILS NFR BLD AUTO: 0 % (ref 0–1)
BUN SERPL-MCNC: 29 MG/DL (ref 5–25)
CALCIUM SERPL-MCNC: 9.3 MG/DL (ref 8.4–10.2)
CHLORIDE SERPL-SCNC: 99 MMOL/L (ref 96–108)
CO2 SERPL-SCNC: 30 MMOL/L (ref 21–32)
CREAT SERPL-MCNC: 1.04 MG/DL (ref 0.6–1.3)
EOSINOPHIL # BLD AUTO: 0.01 THOUSAND/ÂΜL (ref 0–0.61)
EOSINOPHIL NFR BLD AUTO: 0 % (ref 0–6)
ERYTHROCYTE [DISTWIDTH] IN BLOOD BY AUTOMATED COUNT: 13.1 % (ref 11.6–15.1)
GFR SERPL CREATININE-BSD FRML MDRD: 70 ML/MIN/1.73SQ M
GLUCOSE SERPL-MCNC: 135 MG/DL (ref 65–140)
HCT VFR BLD AUTO: 49.8 % (ref 36.5–49.3)
HGB BLD-MCNC: 16.1 G/DL (ref 12–17)
IMM GRANULOCYTES # BLD AUTO: 0.08 THOUSAND/UL (ref 0–0.2)
IMM GRANULOCYTES NFR BLD AUTO: 1 % (ref 0–2)
LYMPHOCYTES # BLD AUTO: 1.23 THOUSANDS/ÂΜL (ref 0.6–4.47)
LYMPHOCYTES NFR BLD AUTO: 10 % (ref 14–44)
MAGNESIUM SERPL-MCNC: 2 MG/DL (ref 1.9–2.7)
MCH RBC QN AUTO: 30.3 PG (ref 26.8–34.3)
MCHC RBC AUTO-ENTMCNC: 32.3 G/DL (ref 31.4–37.4)
MCV RBC AUTO: 94 FL (ref 82–98)
MONOCYTES # BLD AUTO: 0.99 THOUSAND/ÂΜL (ref 0.17–1.22)
MONOCYTES NFR BLD AUTO: 8 % (ref 4–12)
NEUTROPHILS # BLD AUTO: 10.58 THOUSANDS/ÂΜL (ref 1.85–7.62)
NEUTS SEG NFR BLD AUTO: 81 % (ref 43–75)
NRBC BLD AUTO-RTO: 0 /100 WBCS
PLATELET # BLD AUTO: 272 THOUSANDS/UL (ref 149–390)
PMV BLD AUTO: 9.7 FL (ref 8.9–12.7)
POTASSIUM SERPL-SCNC: 4 MMOL/L (ref 3.5–5.3)
RBC # BLD AUTO: 5.31 MILLION/UL (ref 3.88–5.62)
SODIUM SERPL-SCNC: 137 MMOL/L (ref 135–147)
WBC # BLD AUTO: 12.9 THOUSAND/UL (ref 4.31–10.16)

## 2023-11-02 PROCEDURE — 83735 ASSAY OF MAGNESIUM: CPT | Performed by: NURSE PRACTITIONER

## 2023-11-02 PROCEDURE — 85025 COMPLETE CBC W/AUTO DIFF WBC: CPT | Performed by: NURSE PRACTITIONER

## 2023-11-02 PROCEDURE — 80048 BASIC METABOLIC PNL TOTAL CA: CPT | Performed by: NURSE PRACTITIONER

## 2023-11-02 PROCEDURE — 85730 THROMBOPLASTIN TIME PARTIAL: CPT | Performed by: FAMILY MEDICINE

## 2023-11-02 PROCEDURE — 99232 SBSQ HOSP IP/OBS MODERATE 35: CPT | Performed by: FAMILY MEDICINE

## 2023-11-02 RX ORDER — METOPROLOL TARTRATE 5 MG/5ML
5 INJECTION INTRAVENOUS ONCE
Status: COMPLETED | OUTPATIENT
Start: 2023-11-02 | End: 2023-11-02

## 2023-11-02 RX ORDER — QUETIAPINE FUMARATE 25 MG/1
25 TABLET, FILM COATED ORAL
Status: DISCONTINUED | OUTPATIENT
Start: 2023-11-02 | End: 2023-11-03 | Stop reason: HOSPADM

## 2023-11-02 RX ORDER — LANOLIN ALCOHOL/MO/W.PET/CERES
6 CREAM (GRAM) TOPICAL
Status: DISCONTINUED | OUTPATIENT
Start: 2023-11-02 | End: 2023-11-03 | Stop reason: HOSPADM

## 2023-11-02 RX ORDER — FUROSEMIDE 20 MG/1
20 TABLET ORAL DAILY
Status: DISCONTINUED | OUTPATIENT
Start: 2023-11-03 | End: 2023-11-03 | Stop reason: HOSPADM

## 2023-11-02 RX ORDER — DIGOXIN 125 MCG
125 TABLET ORAL DAILY
Status: DISCONTINUED | OUTPATIENT
Start: 2023-11-03 | End: 2023-11-03 | Stop reason: HOSPADM

## 2023-11-02 RX ORDER — METOPROLOL TARTRATE 5 MG/5ML
2.5 INJECTION INTRAVENOUS EVERY 6 HOURS PRN
Status: DISCONTINUED | OUTPATIENT
Start: 2023-11-02 | End: 2023-11-03 | Stop reason: HOSPADM

## 2023-11-02 RX ORDER — DIGOXIN 125 MCG
250 TABLET ORAL 2 TIMES DAILY
Status: COMPLETED | OUTPATIENT
Start: 2023-11-02 | End: 2023-11-02

## 2023-11-02 RX ADMIN — DEXAMETHASONE SODIUM PHOSPHATE 6 MG: 10 INJECTION, SOLUTION INTRAMUSCULAR; INTRAVENOUS at 08:19

## 2023-11-02 RX ADMIN — ATORVASTATIN CALCIUM 20 MG: 20 TABLET, FILM COATED ORAL at 17:16

## 2023-11-02 RX ADMIN — ASPIRIN 81 MG 81 MG: 81 TABLET ORAL at 08:19

## 2023-11-02 RX ADMIN — MELATONIN TAB 3 MG 6 MG: 3 TAB at 22:09

## 2023-11-02 RX ADMIN — UMECLIDINIUM 1 PUFF: 62.5 AEROSOL, POWDER ORAL at 08:22

## 2023-11-02 RX ADMIN — HEPARIN SODIUM 13 UNITS/KG/HR: 10000 INJECTION, SOLUTION INTRAVENOUS at 08:21

## 2023-11-02 RX ADMIN — APIXABAN 5 MG: 5 TABLET, FILM COATED ORAL at 10:59

## 2023-11-02 RX ADMIN — APIXABAN 5 MG: 5 TABLET, FILM COATED ORAL at 17:15

## 2023-11-02 RX ADMIN — Medication 400 MG: at 17:16

## 2023-11-02 RX ADMIN — METOPROLOL TARTRATE 125 MG: 50 TABLET, FILM COATED ORAL at 20:00

## 2023-11-02 RX ADMIN — ALBUTEROL SULFATE 2 PUFF: 90 AEROSOL, METERED RESPIRATORY (INHALATION) at 04:14

## 2023-11-02 RX ADMIN — METOROPROLOL TARTRATE 5 MG: 5 INJECTION, SOLUTION INTRAVENOUS at 05:31

## 2023-11-02 RX ADMIN — METOROPROLOL TARTRATE 5 MG: 5 INJECTION, SOLUTION INTRAVENOUS at 06:26

## 2023-11-02 RX ADMIN — QUETIAPINE FUMARATE 25 MG: 25 TABLET ORAL at 19:36

## 2023-11-02 RX ADMIN — REMDESIVIR 100 MG: 100 INJECTION, POWDER, LYOPHILIZED, FOR SOLUTION INTRAVENOUS at 10:59

## 2023-11-02 RX ADMIN — DIGOXIN 250 MCG: 125 TABLET ORAL at 17:16

## 2023-11-02 RX ADMIN — METOPROLOL TARTRATE 125 MG: 50 TABLET, FILM COATED ORAL at 08:18

## 2023-11-02 RX ADMIN — Medication 400 MG: at 08:19

## 2023-11-02 RX ADMIN — FLUTICASONE FUROATE 1 PUFF: 100 POWDER RESPIRATORY (INHALATION) at 08:22

## 2023-11-02 RX ADMIN — DIGOXIN 250 MCG: 125 TABLET ORAL at 10:30

## 2023-11-02 NOTE — PLAN OF CARE
Problem: PAIN - ADULT  Goal: Verbalizes/displays adequate comfort level or baseline comfort level  Description: Interventions:  - Encourage patient to monitor pain and request assistance  - Assess pain using appropriate pain scale  - Administer analgesics based on type and severity of pain and evaluate response  - Implement non-pharmacological measures as appropriate and evaluate response  - Consider cultural and social influences on pain and pain management  - Notify physician/advanced practitioner if interventions unsuccessful or patient reports new pain  Outcome: Progressing     Problem: INFECTION - ADULT  Goal: Absence or prevention of progression during hospitalization  Description: INTERVENTIONS:  - Assess and monitor for signs and symptoms of infection  - Monitor lab/diagnostic results  - Monitor all insertion sites, i.e. indwelling lines, tubes, and drains  - Monitor endotracheal if appropriate and nasal secretions for changes in amount and color  - [place] appropriate cooling/warming therapies per order  - Administer medications as ordered  - Instruct and encourage patient and family to use good hand hygiene technique  - Identify and instruct in appropriate isolation precautions for identified infection/condition  Outcome: Progressing  Goal: Absence of fever/infection during neutropenic period  Description: INTERVENTIONS:  - Monitor WBC    Outcome: Progressing     Problem: SAFETY ADULT  Goal: Patient will remain free of falls  Description: INTERVENTIONS:  - Educate patient/family on patient safety including physical limitations  - Instruct patient to call for assistance with activity   - Consult OT/PT to assist with strengthening/mobility   - Keep Call bell within reach  - Keep bed low and locked with side rails adjusted as appropriate  - Keep care items and personal belongings within reach  - Initiate and maintain comfort rounds  - Make Fall Risk Sign visible to staff  - Offer Toileting every 2 Hours, in advance of need  - Initiate/Maintain na alarm  - Obtain necessary fall risk management equipment: na  - Apply yellow socks and bracelet for high fall risk patients  - Consider moving patient to room near nurses station  Outcome: Progressing  Goal: Maintain or return to baseline ADL function  Description: INTERVENTIONS:  -  Assess patient's ability to carry out ADLs; assess patient's baseline for ADL function and identify physical deficits which impact ability to perform ADLs (bathing, care of mouth/teeth, toileting, grooming, dressing, etc.)  - Assess/evaluate cause of self-care deficits   - Assess range of motion  - Assess patient's mobility; develop plan if impaired  - Assess patient's need for assistive devices and provide as appropriate  - Encourage maximum independence but intervene and supervise when necessary  - Involve family in performance of ADLs  - Assess for home care needs following discharge   - Consider OT consult to assist with ADL evaluation and planning for discharge  - Provide patient education as appropriate  Outcome: Progressing  Goal: Maintains/Returns to pre admission functional level  Description: INTERVENTIONS:  - Perform BMAT or MOVE assessment daily.   - Set and communicate daily mobility goal to care team and patient/family/caregiver. - Collaborate with rehabilitation services on mobility goals if consulted  - Perform Range of Motion 2 times a day. - Reposition patient every 2 hours.   - Dangle patient 2 times a day  - Stand patient 2 times a day  - Ambulate patient 2 times a day  - Out of bed to chair 2 times a day   - Out of bed for meals 2 times a day  - Out of bed for toileting  - Record patient progress and toleration of activity level   Outcome: Progressing

## 2023-11-02 NOTE — ASSESSMENT & PLAN NOTE
COVID 19 positive in ED with 3 weeks of URI symptoms, given <3L, treating as mild  Criteria for mild COVID pathway based on 2 to 3 L of oxygen requirement on admission. Currently he is satting well ON ra  Continue with IV dexamethasone and remdesivir. 5/5  Follow-up on inflammatory markers  He is on RA dc heparin transition to eliquis for afib

## 2023-11-02 NOTE — ASSESSMENT & PLAN NOTE
Presented with NEW onset Atrial fibrillation with RVR in ED, in the  setting of COVID 19 section. [id number] of at least 2,   History of prior perioperative PE in setting of resection of lung cancer in remission, had been off anticoagulation after completing 6 months of treatment. .   As off oxygen from covid dc heparin drip and switch to eliquis 5 mg po bid wife ok to pay  TTE -EF 45-50  No plan for DC cardioversion per cardiology as he is not off isolation - but made npo after midnight just in case rates will be difficult to control will need WILLARD cardio  Today rates in am high given prn lopressor - increase metoprolol to 125 mg po bid and also discussed with cardio he was loaded with digoxin and placed on oral dig from joe

## 2023-11-02 NOTE — ASSESSMENT & PLAN NOTE
Status post RLL lobectomy in 12/2018  Follows with Oncology at [facility]  Follow-up outpatient CT scan per pulmonary/oncology recommendations.

## 2023-11-02 NOTE — ASSESSMENT & PLAN NOTE
Not on home O2 requirement, s/p lung mass resection, in remission  Lungs clear continue steroid inhaler   He is on steroids

## 2023-11-02 NOTE — PROGRESS NOTES
Reviewed telemetry, labs. Had episode of rapid atrial fibrillation this morning. Hospitalist service titrated metoprolol further. Initiate digoxin for improved rate control. Trying to avoid WILLARD guided cardioversion given that he is still in isolation from his COVID. Heart rates in the afternoon were trending better in the 60-70 range. Continue oral metoprolol give digoxin orally today as well. Make n.p.o. after midnight in case WILLARD guided cardioversion is needed if heart rates become uncontrolled.

## 2023-11-02 NOTE — PROGRESS NOTES
[address] [facility]  Progress Note  Name: Olesya Caldwell  MRN: [id number]  Unit/Bed#: -01 I Date of Admission: 10/29/2023   Date of Service: 11/2/2023 I Hospital Day: 4    Assessment/Plan   * Atrial fibrillation [facility])  Assessment & Plan  Presented with NEW onset Atrial fibrillation with RVR in ED, in the  setting of COVID 19 section. [id number] of at least 2,   History of prior perioperative PE in setting of resection of lung cancer in remission, had been off anticoagulation after completing 6 months of treatment. .   As off oxygen from covid dc heparin drip and switch to eliquis 5 mg po bid wife ok to pay  TTE -EF 45-50  No plan for DC cardioversion per cardiology as he is not off isolation - but made npo after midnight just in case rates will be difficult to control will need WILLARD cardio  Today rates in am high given prn lopressor - increase metoprolol to 125 mg po bid and also discussed with cardio he was loaded with digoxin and placed on oral dig from joe     Acute on chronic diastolic congestive heart failure [facility])  Assessment & Plan  Wt Readings from Last 3 Encounters:   10/30/23 (!) 148 kg (327 lb)   09/21/21 (!) 143 kg (316 lb)   03/23/21 (!) 142 kg (312 lb 6.4 oz)     Patient does have evidence of edema he does have rhonchi on his lungs. Weight gain he is on Lasix 20 mg p.o. daily he is EF on 2D echo is 45 to 50% with a mild to moderate AS. Status post additional Lasix dose given yesterday. Is improved        COVID  Assessment & Plan  COVID 19 positive in ED with 3 weeks of URI symptoms, given <3L, treating as mild  Criteria for mild COVID pathway based on 2 to 3 L of oxygen requirement on admission. Currently he is satting well ON ra  Continue with IV dexamethasone and remdesivir. 5/5  Follow-up on inflammatory markers  He is on RA dc heparin transition to eliquis for afib    Chronic obstructive pulmonary disease, unspecified ([address])  Assessment & Plan  Not on home O2 requirement, s/p lung mass resection, in remission  Lungs clear continue steroid inhaler   He is on steroids    Essential (primary) hypertension  Assessment & Plan  Pressure control using metoprolol for A-fib Lasix    Malignant neoplasm of lower lobe, right bronchus or lung [facility])  Assessment & Plan  Status post RLL lobectomy in 2018  Follows with Oncology at [facility]  Follow-up outpatient CT scan per pulmonary/oncology recommendations. History of pulmonary embolism  Assessment & Plan  Prior PE in the setting of resection of lung mass,   Completed anticoagulation for 6 months. VTE Pharmacologic Prophylaxis:   Moderate Risk (Score 3-4) - Pharmacological DVT Prophylaxis Ordered: heparin drip. Patient Centered Rounds: I performed bedside rounds with nursing staff today. Discussions with Specialists or Other Care Team Provider: will discuss with cardio    Education and Discussions with Family / Patient: Updated  (wife) at bedside. Total Time Spent on Date of Encounter in care of patient: >35   mins. This time was spent on one or more of the following: performing physical exam; counseling and coordination of care; obtaining or reviewing history; documenting in the medical record; reviewing/ordering tests, medications or procedures; communicating with other healthcare professionals and discussing with patient's family/caregivers. Current Length of Stay: 4 day(s)  Current Patient Status: Inpatient   Certification Statement: The patient will continue to require additional inpatient hospital stay due to afib with rvr  Discharge Plan: Anticipate discharge in 24-48 hrs to home. Code Status: Level 1 - Full Code    Subjective:   Seen and examined discussed with nursing he has beensleeping bad and started to get confused last night now back to nml .  He has no complaints    Objective:     Vitals:   Temp (24hrs), Av.9 °F (36.6 °C), Min:97.5 °F (36.4 °C), Max:98.3 °F (36.8 °C)    Temp:  [97.5 °F (36.4 °C)-98.3 °F (36.8 °C)] 97.5 °F (36.4 °C)  HR:  [] 62  Resp:  [17-22] 20  BP: (117-161)/() 117/78  SpO2:  [94 %-97 %] 96 %  Body mass index is 43.14 kg/m². Input and Output Summary (last 24 hours): Intake/Output Summary (Last 24 hours) at 11/2/2023 1102  Last data filed at 11/2/2023 0901  Gross per 24 hour   Intake 886.24 ml   Output --   Net 886.24 ml       Physical Exam:   Physical Exam  Vitals and nursing note reviewed. Constitutional:       General: He is not in acute distress. Appearance: He is well-developed. HENT:      Head: Normocephalic and atraumatic. Eyes:      Conjunctiva/sclera: Conjunctivae normal.   Cardiovascular:      Rate and Rhythm: Normal rate. Rhythm irregular. Heart sounds: No murmur heard. Pulmonary:      Effort: Pulmonary effort is normal. No respiratory distress. Breath sounds: Normal breath sounds. No wheezing or rales. Abdominal:      General: There is no distension. Palpations: Abdomen is soft. Tenderness: There is no abdominal tenderness. Musculoskeletal:         General: Swelling (mild) present. Cervical back: Neck supple. Skin:     General: Skin is warm and dry. Capillary Refill: Capillary refill takes less than 2 seconds. Neurological:      General: No focal deficit present. Mental Status: He is alert and oriented to person, place, and time.    Psychiatric:         Mood and Affect: Mood normal.          Additional Data:     Labs:  Results from last 7 days   Lab Units 11/02/23  0641   WBC Thousand/uL 12.90*   HEMOGLOBIN g/dL 16.1   HEMATOCRIT % 49.8*   PLATELETS Thousands/uL 272   NEUTROS PCT % 81*   LYMPHS PCT % 10*   MONOS PCT % 8   EOS PCT % 0     Results from last 7 days   Lab Units 11/02/23  0641 11/01/23  0500   SODIUM mmol/L 137 138   POTASSIUM mmol/L 4.0 4.2   CHLORIDE mmol/L 99 102   CO2 mmol/L 30 31   BUN mg/dL 29* 31*   CREATININE mg/dL 1.04 0.97   ANION GAP mmol/L 8 5 CALCIUM mg/dL 9.3 9.0   ALBUMIN g/dL  --  3.7   TOTAL BILIRUBIN mg/dL  --  1.18*   ALK PHOS U/L  --  46   ALT U/L  --  37   AST U/L  --  29   GLUCOSE RANDOM mg/dL 135 131     Results from last 7 days   Lab Units 10/29/23  0644   INR  1.17             Results from last 7 days   Lab Units 10/31/23  0532 10/29/23  0644   LACTIC ACID mmol/L  --  1.4   PROCALCITONIN ng/ml 0.08 0.18       Lines/Drains:  Invasive Devices       Peripheral Intravenous Line  Duration             Peripheral IV 10/29/23 Distal;Left;Upper;Ventral (anterior) Arm 4 days    Peripheral IV 10/30/23 Right Antecubital 3 days                      Telemetry:  Telemetry Orders (From admission, onward)               24 Hour Telemetry Monitoring  Continuous x 24 Hours (Telem)        Question:  Reason for 24 Hour Telemetry  Answer:  Arrhythmias requiring acute medical intervention / PPM or ICD malfunction                     Telemetry Reviewed: Atrial fibrillation. HR averaging 120  Indication for Continued Telemetry Use: Arrthymias requiring medical therapy             Imaging: Reviewed radiology reports from this admission including: chest xray    Recent Cultures (last 7 days):   Results from last 7 days   Lab Units 10/29/23  0644   BLOOD CULTURE  No Growth at 72 hrs. No Growth at 72 hrs.        Last 24 Hours Medication List:   Current Facility-Administered Medications   Medication Dose Route Frequency Provider Last Rate    albuterol  2 puff Inhalation Q4H PRN Radha Uribe MD      apixaban  5 mg Oral BID Radha Uribe MD      artificial tear   Both Eyes HS PRN Kim Boogie MD      aspirin  81 mg Oral Daily Kim Boogie MD      atorvastatin  20 mg Oral Daily With Manny Borrero MD      dexamethasone  6 mg Intravenous Q24H Tiesha Lopez MD      [START ON 11/3/2023] digoxin  125 mcg Oral Daily Yvrose Allison DO      digoxin  250 mcg Oral BID Yvrose Allison DO      fluticasone  1 puff Inhalation Daily MD Juanis Denny ON 11/3/2023] furosemide  20 mg Oral Daily Zoila Minaya MD      magnesium Oxide  400 mg Oral BID Soledad May MD      melatonin  6 mg Oral HS Zoila Minaya MD      metoprolol  2.5 mg Intravenous Q6H PRN CHOCO Hudson      metoprolol tartrate  125 mg Oral Q12H [facility] Zoila Minaya MD      remdesivir  100 mg Intravenous Q24H Chacho Rabago MD      umeclidinium  1 puff Inhalation Daily Zoila Minaya MD          Today, Patient Was Seen By: Zoila Minaya MD    **Please Note: This note may have been constructed using a voice recognition system. **

## 2023-11-02 NOTE — PLAN OF CARE
Problem: PAIN - ADULT  Goal: Verbalizes/displays adequate comfort level or baseline comfort level  Description: Interventions:  - Encourage patient to monitor pain and request assistance  - Assess pain using appropriate pain scale  - Administer analgesics based on type and severity of pain and evaluate response  - Implement non-pharmacological measures as appropriate and evaluate response  - Consider cultural and social influences on pain and pain management  - Notify physician/advanced practitioner if interventions unsuccessful or patient reports new pain  Outcome: Progressing     Problem: INFECTION - ADULT  Goal: Absence or prevention of progression during hospitalization  Description: INTERVENTIONS:  - Assess and monitor for signs and symptoms of infection  - Monitor lab/diagnostic results  - Monitor all insertion sites, i.e. indwelling lines, tubes, and drains  - Monitor endotracheal if appropriate and nasal secretions for changes in amount and color  - [place] appropriate cooling/warming therapies per order  - Administer medications as ordered  - Instruct and encourage patient and family to use good hand hygiene technique  - Identify and instruct in appropriate isolation precautions for identified infection/condition  Outcome: Progressing  Goal: Absence of fever/infection during neutropenic period  Description: INTERVENTIONS:  - Monitor WBC    Outcome: Progressing     Problem: SAFETY ADULT  Goal: Patient will remain free of falls  Description: INTERVENTIONS:  - Educate patient/family on patient safety including physical limitations  - Instruct patient to call for assistance with activity   - Consult OT/PT to assist with strengthening/mobility   - Keep Call bell within reach  - Keep bed low and locked with side rails adjusted as appropriate  - Keep care items and personal belongings within reach  - Initiate and maintain comfort rounds  - Make Fall Risk Sign visible to staff  - Offer Toileting every 2 Hours, in advance of need  - Initiate/Maintain na alarm  - Obtain necessary fall risk management equipment: na  - Apply yellow socks and bracelet for high fall risk patients  - Consider moving patient to room near nurses station  Outcome: Progressing  Goal: Maintain or return to baseline ADL function  Description: INTERVENTIONS:  -  Assess patient's ability to carry out ADLs; assess patient's baseline for ADL function and identify physical deficits which impact ability to perform ADLs (bathing, care of mouth/teeth, toileting, grooming, dressing, etc.)  - Assess/evaluate cause of self-care deficits   - Assess range of motion  - Assess patient's mobility; develop plan if impaired  - Assess patient's need for assistive devices and provide as appropriate  - Encourage maximum independence but intervene and supervise when necessary  - Involve family in performance of ADLs  - Assess for home care needs following discharge   - Consider OT consult to assist with ADL evaluation and planning for discharge  - Provide patient education as appropriate  Outcome: Progressing  Goal: Maintains/Returns to pre admission functional level  Description: INTERVENTIONS:  - Perform BMAT or MOVE assessment daily.   - Set and communicate daily mobility goal to care team and patient/family/caregiver. - Collaborate with rehabilitation services on mobility goals if consulted  - Perform Range of Motion 2 times a day. - Reposition patient every 2 hours.   - Dangle patient 2 times a day  - Stand patient 2 times a day  - Ambulate patient 2 times a day  - Out of bed to chair 2 times a day   - Out of bed for meals 2 times a day  - Out of bed for toileting  - Record patient progress and toleration of activity level   Outcome: Progressing     Problem: Potential for Falls  Goal: Patient will remain free of falls  Description: INTERVENTIONS:  - Educate patient/family on patient safety including physical limitations  - Instruct patient to call for assistance with activity   - Consult OT/PT to assist with strengthening/mobility   - Keep Call bell within reach  - Keep bed low and locked with side rails adjusted as appropriate  - Keep care items and personal belongings within reach  - Initiate and maintain comfort rounds  - Make Fall Risk Sign visible to staff  - Offer Toileting every 2 Hours, in advance of need  - Initiate/Maintain na alarm  - Obtain necessary fall risk management equipment: na  - Apply yellow socks and bracelet for high fall risk patients  - Consider moving patient to room near nurses station  Outcome: Progressing     Problem: MOBILITY - ADULT  Goal: Maintain or return to baseline ADL function  Description: INTERVENTIONS:  -  Assess patient's ability to carry out ADLs; assess patient's baseline for ADL function and identify physical deficits which impact ability to perform ADLs (bathing, care of mouth/teeth, toileting, grooming, dressing, etc.)  - Assess/evaluate cause of self-care deficits   - Assess range of motion  - Assess patient's mobility; develop plan if impaired  - Assess patient's need for assistive devices and provide as appropriate  - Encourage maximum independence but intervene and supervise when necessary  - Involve family in performance of ADLs  - Assess for home care needs following discharge   - Consider OT consult to assist with ADL evaluation and planning for discharge  - Provide patient education as appropriate  Outcome: Progressing  Goal: Maintains/Returns to pre admission functional level  Description: INTERVENTIONS:  - Perform BMAT or MOVE assessment daily.   - Set and communicate daily mobility goal to care team and patient/family/caregiver. - Collaborate with rehabilitation services on mobility goals if consulted  - Perform Range of Motion 2 times a day. - Reposition patient every 2 hours.   - Dangle patient 2 times a day  - Stand patient 2 times a day  - Ambulate patient 2 times a day  - Out of bed to chair 2 times a day   - Out of bed for meals 2 times a day  - Out of bed for toileting  - Record patient progress and toleration of activity level   Outcome: Progressing     Problem: CARDIOVASCULAR - ADULT  Goal: Maintains optimal cardiac output and hemodynamic stability  Description: INTERVENTIONS:  - Monitor I/O, vital signs and rhythm  - Monitor for S/S and trends of decreased cardiac output  - Administer and titrate ordered vasoactive medications to optimize hemodynamic stability  - Assess quality of pulses, skin color and temperature  - Assess for signs of decreased coronary artery perfusion  - Instruct patient to report change in severity of symptoms  Outcome: Progressing  Goal: Absence of cardiac dysrhythmias or at baseline rhythm  Description: INTERVENTIONS:  - Continuous cardiac monitoring, vital signs, obtain 12 lead EKG if ordered  - Administer antiarrhythmic and heart rate control medications as ordered  - Monitor electrolytes and administer replacement therapy as ordered  Outcome: Progressing     Problem: RESPIRATORY - ADULT  Goal: Achieves optimal ventilation and oxygenation  Description: INTERVENTIONS:  - Assess for changes in respiratory status  - Assess for changes in mentation and behavior  - Position to facilitate oxygenation and minimize respiratory effort  - Oxygen administered by appropriate delivery if ordered  - Initiate smoking cessation education as indicated  - Encourage broncho-pulmonary hygiene including cough, deep breathe, Incentive Spirometry  - Assess the need for suctioning and aspirate as needed  - Assess and instruct to report SOB or any respiratory difficulty  - Respiratory Therapy support as indicated  Outcome: Progressing

## 2023-11-03 VITALS
RESPIRATION RATE: 18 BRPM | DIASTOLIC BLOOD PRESSURE: 67 MMHG | SYSTOLIC BLOOD PRESSURE: 136 MMHG | BODY MASS INDEX: 41.75 KG/M2 | HEIGHT: 73 IN | WEIGHT: 315 LBS | HEART RATE: 55 BPM | TEMPERATURE: 97.7 F | OXYGEN SATURATION: 97 %

## 2023-11-03 LAB
ANION GAP SERPL CALCULATED.3IONS-SCNC: 9 MMOL/L
BACTERIA BLD CULT: NORMAL
BACTERIA BLD CULT: NORMAL
BUN SERPL-MCNC: 29 MG/DL (ref 5–25)
CALCIUM SERPL-MCNC: 9 MG/DL (ref 8.4–10.2)
CHLORIDE SERPL-SCNC: 104 MMOL/L (ref 96–108)
CO2 SERPL-SCNC: 24 MMOL/L (ref 21–32)
CREAT SERPL-MCNC: 1.01 MG/DL (ref 0.6–1.3)
GFR SERPL CREATININE-BSD FRML MDRD: 72 ML/MIN/1.73SQ M
GLUCOSE SERPL-MCNC: 130 MG/DL (ref 65–140)
POTASSIUM SERPL-SCNC: 4.6 MMOL/L (ref 3.5–5.3)
SODIUM SERPL-SCNC: 137 MMOL/L (ref 135–147)

## 2023-11-03 PROCEDURE — 99239 HOSP IP/OBS DSCHRG MGMT >30: CPT | Performed by: FAMILY MEDICINE

## 2023-11-03 PROCEDURE — 80048 BASIC METABOLIC PNL TOTAL CA: CPT | Performed by: FAMILY MEDICINE

## 2023-11-03 RX ORDER — BUDESONIDE 0.5 MG/2ML
0.5 INHALANT ORAL 2 TIMES DAILY
Qty: 120 ML | Refills: 0 | Status: SHIPPED | OUTPATIENT
Start: 2023-11-03 | End: 2023-12-03

## 2023-11-03 RX ORDER — METOPROLOL TARTRATE 100 MG/1
100 TABLET ORAL EVERY 12 HOURS SCHEDULED
Qty: 60 TABLET | Refills: 0 | Status: SHIPPED | OUTPATIENT
Start: 2023-11-03

## 2023-11-03 RX ORDER — PREDNISONE 10 MG/1
TABLET ORAL
Qty: 18 TABLET | Refills: 0 | Status: SHIPPED | OUTPATIENT
Start: 2023-11-04 | End: 2023-11-13

## 2023-11-03 RX ORDER — DIGOXIN 125 MCG
125 TABLET ORAL DAILY
Qty: 30 TABLET | Refills: 0 | Status: SHIPPED | OUTPATIENT
Start: 2023-11-04

## 2023-11-03 RX ORDER — METOPROLOL TARTRATE 50 MG/1
100 TABLET, FILM COATED ORAL EVERY 12 HOURS SCHEDULED
Status: DISCONTINUED | OUTPATIENT
Start: 2023-11-03 | End: 2023-11-03 | Stop reason: HOSPADM

## 2023-11-03 RX ADMIN — METOPROLOL TARTRATE 100 MG: 50 TABLET, FILM COATED ORAL at 08:54

## 2023-11-03 RX ADMIN — FUROSEMIDE 20 MG: 20 TABLET ORAL at 08:53

## 2023-11-03 RX ADMIN — UMECLIDINIUM 1 PUFF: 62.5 AEROSOL, POWDER ORAL at 08:55

## 2023-11-03 RX ADMIN — Medication 400 MG: at 08:53

## 2023-11-03 RX ADMIN — APIXABAN 5 MG: 5 TABLET, FILM COATED ORAL at 08:53

## 2023-11-03 RX ADMIN — ASPIRIN 81 MG 81 MG: 81 TABLET ORAL at 08:53

## 2023-11-03 RX ADMIN — FLUTICASONE FUROATE 1 PUFF: 100 POWDER RESPIRATORY (INHALATION) at 08:55

## 2023-11-03 RX ADMIN — DEXAMETHASONE SODIUM PHOSPHATE 6 MG: 10 INJECTION, SOLUTION INTRAMUSCULAR; INTRAVENOUS at 08:54

## 2023-11-03 NOTE — CASE MANAGEMENT
Case Management Discharge Planning Note    Patient name Ariadna Rodriguez  Location /-91 MRN [id number]  : 1950 Date 11/3/2023       Current Admission Date: 10/29/2023  Current Admission Diagnosis:Atrial fibrillation [facility])   Patient Active Problem List    Diagnosis Date Noted    Acute on chronic diastolic congestive heart failure ([address]) 10/31/2023    Lung mass 10/29/2023    COVID 10/29/2023    Atrial fibrillation ([address]) 10/29/2023    Morbid obesity ([address]) 2021    Chronic bronchitis ([address]) 2021    Mixed simple and mucopurulent chronic bronchitis ([address]) 2019    Hypoxemia requiring supplemental oxygen 2019    History of pulmonary embolism 2019    Malignant neoplasm of lower lobe, right bronchus or lung ([address]) 2019    Essential (primary) hypertension 2016    Chronic obstructive pulmonary disease, unspecified ([address]) 2012      LOS (days): 5  Geometric Mean LOS (GMLOS) (days): 5.00  Days to GMLOS:-0.1     OBJECTIVE:  Risk of Unplanned Readmission Score: 17.69         Current admission status: Inpatient   Preferred Pharmacy:   [address] [place] [address]  Phone: [phone] Fax: [phone]    Primary Care Provider: Tracee Faust DO    Primary Insurance: MEDICARE  Secondary Insurance: [facility]    DISCHARGE DETAILS:    Discharge planning discussed with[de-identified] patient and spouse  Freedom of Choice: Yes     CM contacted family/caregiver?: Yes  Were Treatment Team discharge recommendations reviewed with patient/caregiver?: Yes  Did patient/caregiver verbalize understanding of patient care needs?: Yes  Were patient/caregiver advised of the risks associated with not following Treatment Team discharge recommendations?: Yes    Contacts  Patient Contacts: christal Guerrero  Relationship to Patient[de-identified] Family  Contact Method:  In Person  Reason/Outcome: Discharge Planning    Requested Home Health Care         Is the patient interested in [address] at discharge?: No    DME Referral Provided  Referral made for DME?: No         Would you like to participate in our [id number] service program?  : No - Declined    Treatment Team Recommendation: Home  Discharge Destination Plan[de-identified] Home  Transport at Discharge : Family                             IMM Given (Date):: 11/03/23  IMM Given to[de-identified] Patient  Family notified[de-identified] appeal rights reviewed with patient              CM met with patient and spouse to review discharge today. Spouse indicated she went to pharmacy to  Eliquis but coupon did not work so she paid cash. CM provided spouse with another coupon to use for next month prescription. CM spoke to patient and spouse at the bedside, reviewed DC IMM with patient and informed that patient can stay an additional 4 hours for reconsidering appealing the discharge as the medicare rights were review on the day of discharge. Pt verbalized understanding and feels ready to go home and does not intend to stay 4 hours to reconsider. IMM placed in bin for fiilng.

## 2023-11-03 NOTE — ASSESSMENT & PLAN NOTE
Not on home O2 requirement, s/p lung mass resection, in remission  Some mild exacerbation will discharge on prednisone taper 30 mg daily as he has been here on Decadron 6 for 5 days and reduce by 10 every 3 days.   Unfortunately a lot of his inhalers are not covered and cost a lot of money as I try to send it through hands therefore he may use his albuterol as needed but I did place him on Pulmicort nebulizers that what seems to be covered

## 2023-11-03 NOTE — NURSING NOTE
Patient discharged to home in stable condition. All discharge instructions gone over with patient including medications. NO further questions noted. All belongings sent home with patient.

## 2023-11-03 NOTE — ASSESSMENT & PLAN NOTE
Wt Readings from Last 3 Encounters:   10/30/23 (!) 148 kg (327 lb)   09/21/21 (!) 143 kg (316 lb)   03/23/21 (!) 142 kg (312 lb 6.4 oz)     Patient does have evidence of edema he does have rhonchi on his lungs. he is EF on 2D echo is 45 to 50% with a mild to moderate AS.     Resume home dose Lasix to 40 mg daily

## 2023-11-03 NOTE — ASSESSMENT & PLAN NOTE
COVID 19 positive in ED with 3 weeks of URI symptoms, given <3L, treating as mild  Criteria for mild COVID pathway based on 2 to 3 L of oxygen requirement on admission. Currently he is satting well ON ra  Continue with IV dexamethasone and remdesivir. 4/5 does not need further as patient is on room air with symptoms improvement and will be discharged  Follow-up on inflammatory markers  He is on RA dc heparin transition to eliquis for afib

## 2023-11-03 NOTE — ASSESSMENT & PLAN NOTE
Presented with NEW onset Atrial fibrillation with RVR in ED, in the  setting of COVID 19 section. [id number] of at least 2,   History of prior perioperative PE in setting of resection of lung cancer in remission, had been off anticoagulation after completing 6 months of treatment. .   As off oxygen from covid dc heparin drip and switch to eliquis 5 mg po bid wife ok to pay  TTE -EF 45-50  No plan for DC cardioversion per cardiology as he is not off isolation should follow-up with his cardiologist to consider later  Patient rates are improved he was some bradycardia discussed with cardiology okay to DC decrease metoprolol and DC to Toprol tartrate 100 mg p.o. twice daily and digoxin 0.125 mcg p.o. daily digoxin level next week Tuesday

## 2023-11-03 NOTE — PLAN OF CARE
Problem: PAIN - ADULT  Goal: Verbalizes/displays adequate comfort level or baseline comfort level  Description: Interventions:  - Encourage patient to monitor pain and request assistance  - Assess pain using appropriate pain scale  - Administer analgesics based on type and severity of pain and evaluate response  - Implement non-pharmacological measures as appropriate and evaluate response  - Consider cultural and social influences on pain and pain management  - Notify physician/advanced practitioner if interventions unsuccessful or patient reports new pain  Outcome: Progressing     Problem: INFECTION - ADULT  Goal: Absence or prevention of progression during hospitalization  Description: INTERVENTIONS:  - Assess and monitor for signs and symptoms of infection  - Monitor lab/diagnostic results  - Monitor all insertion sites, i.e. indwelling lines, tubes, and drains  - Monitor endotracheal if appropriate and nasal secretions for changes in amount and color  - [place] appropriate cooling/warming therapies per order  - Administer medications as ordered  - Instruct and encourage patient and family to use good hand hygiene technique  - Identify and instruct in appropriate isolation precautions for identified infection/condition  Outcome: Progressing  Goal: Absence of fever/infection during neutropenic period  Description: INTERVENTIONS:  - Monitor WBC    Outcome: Progressing     Problem: SAFETY ADULT  Goal: Patient will remain free of falls  Description: INTERVENTIONS:  - Educate patient/family on patient safety including physical limitations  - Instruct patient to call for assistance with activity   - Consult OT/PT to assist with strengthening/mobility   - Keep Call bell within reach  - Keep bed low and locked with side rails adjusted as appropriate  - Keep care items and personal belongings within reach  - Initiate and maintain comfort rounds  - Make Fall Risk Sign visible to staff  - Offer Toileting every 2 Hours, in advance of need  - Initiate/Maintain na alarm  - Obtain necessary fall risk management equipment: na  - Apply yellow socks and bracelet for high fall risk patients  - Consider moving patient to room near nurses station  Outcome: Progressing  Goal: Maintain or return to baseline ADL function  Description: INTERVENTIONS:  -  Assess patient's ability to carry out ADLs; assess patient's baseline for ADL function and identify physical deficits which impact ability to perform ADLs (bathing, care of mouth/teeth, toileting, grooming, dressing, etc.)  - Assess/evaluate cause of self-care deficits   - Assess range of motion  - Assess patient's mobility; develop plan if impaired  - Assess patient's need for assistive devices and provide as appropriate  - Encourage maximum independence but intervene and supervise when necessary  - Involve family in performance of ADLs  - Assess for home care needs following discharge   - Consider OT consult to assist with ADL evaluation and planning for discharge  - Provide patient education as appropriate  Outcome: Progressing  Goal: Maintains/Returns to pre admission functional level  Description: INTERVENTIONS:  - Perform BMAT or MOVE assessment daily.   - Set and communicate daily mobility goal to care team and patient/family/caregiver. - Collaborate with rehabilitation services on mobility goals if consulted  - Perform Range of Motion 2 times a day. - Reposition patient every 2 hours.   - Dangle patient 2 times a day  - Stand patient 2 times a day  - Ambulate patient 2 times a day  - Out of bed to chair 2 times a day   - Out of bed for meals 2 times a day  - Out of bed for toileting  - Record patient progress and toleration of activity level   Outcome: Progressing

## 2023-11-03 NOTE — DISCHARGE SUMMARY
[address] [facility]  Discharge- Kathi Rosado 1950, 75 y.o. male MRN: [id number]  Unit/Bed#: -01 Encounter: [id number]  Primary Care Provider: Darrell Wright DO   Date and time admitted to hospital: 10/29/2023  6:25 AM    * Atrial fibrillation [facility])  Assessment & Plan  Presented with NEW onset Atrial fibrillation with RVR in ED, in the  setting of COVID 19 section. [id number] of at least 2,   History of prior perioperative PE in setting of resection of lung cancer in remission, had been off anticoagulation after completing 6 months of treatment. .   As off oxygen from covid dc heparin drip and switch to eliquis 5 mg po bid wife ok to pay  TTE -EF 45-50  No plan for DC cardioversion per cardiology as he is not off isolation should follow-up with his cardiologist to consider later  Patient rates are improved he was some bradycardia discussed with cardiology okay to DC decrease metoprolol and DC to Toprol tartrate 100 mg p.o. twice daily and digoxin 0.125 mcg p.o. daily digoxin level next week Tuesday    Acute on chronic diastolic congestive heart failure [facility])  Assessment & Plan  Wt Readings from Last 3 Encounters:   10/30/23 (!) 148 kg (327 lb)   09/21/21 (!) 143 kg (316 lb)   03/23/21 (!) 142 kg (312 lb 6.4 oz)     Patient does have evidence of edema he does have rhonchi on his lungs. he is EF on 2D echo is 45 to 50% with a mild to moderate AS. Resume home dose Lasix to 40 mg daily    [address] 19 positive in ED with 3 weeks of URI symptoms, given <3L, treating as mild  Criteria for mild COVID pathway based on 2 to 3 L of oxygen requirement on admission. Currently he is satting well ON ra  Continue with IV dexamethasone and remdesivir. 4/5 does not need further as patient is on room air with symptoms improvement and will be discharged  Follow-up on inflammatory markers  He is on RA dc heparin transition to eliquis for afib    Chronic obstructive pulmonary disease, unspecified ([address])  Assessment & Plan  Not on home O2 requirement, s/p lung mass resection, in remission  Some mild exacerbation will discharge on prednisone taper 30 mg daily as he has been here on Decadron 6 for 5 days and reduce by 10 every 3 days. Unfortunately a lot of his inhalers are not covered and cost a lot of money as I try to send it through hands therefore he may use his albuterol as needed but I did place him on Pulmicort nebulizers that what seems to be covered    Essential (primary) hypertension  Assessment & Plan  Pressure control using metoprolol for A-fib Lasix    Malignant neoplasm of lower lobe, right bronchus or lung [facility])  Assessment & Plan  Status post RLL lobectomy in 12/2018  Follows with Oncology at [facility]  Follow-up outpatient CT scan per pulmonary/oncology recommendations. History of pulmonary embolism  Assessment & Plan  Prior PE in the setting of resection of lung mass,   Completed anticoagulation for 6 months. Medical Problems       Resolved Problems  Date Reviewed: 11/3/2023   None       Discharging Physician / Practitioner: Duc Scales MD  PCP: Nan Fiore DO  Admission Date:   Admission Orders (From admission, onward)       Ordered        10/29/23 0835  INPATIENT ADMISSION  Once                          Discharge Date: 11/03/23    Consultations During Hospital Stay:  Cardiology    Procedures Performed:   None    Significant Findings / Test Results:   Tte-  Limited and very technically difficult study    Left Ventricle: Left ventricular cavity size is normal. Wall thickness is mildly increased. The left ventricular ejection fraction is 45-50% by visual estimation. . Systolic function is mildly reduced. Unable to assess complete regional wall motion given loack of endocardial definition. Pt has allergy to Definity contrast    Left Atrium: The atrium is mildly dilated. Aortic Valve: The leaflets are moderately calcified.  Unable to assess aortic valve stenosis due to poor Doppler exam.    Mitral Valve: There is mild regurgitation. Aorta: The aortic root is mildly dilated. IVC/SVC: The inferior vena cava is normal in size. Respirophasic changes were normal.  CTA PE study- 1. No pulmonary embolism or pneumonia. 2.  Few mildly enlarged mediastinal lymph nodes may be reactive, but given patient's cancer history, correlation with prior imaging is advised . 3. Few small areas of groundglass in the left lower lobe measuring up to 1.3 cm may be due to motion artifact, focal atelectasis or a nodule(s). Correlation with prior imaging is advised    Incidental Findings:   See above follow-up with outpatient pulmonology    Test Results Pending at Discharge (will require follow up):   none     Outpatient Tests Requested:  Digoxin 95/2    Complications:  none    Reason for Admission: Shortness of breath positive COVID    Hospital Course:   Griffin Shelton is a 75 y.o. male patient who originally presented to the hospital on 10/29/2023 due to shortness of breath positive COVID initially on oxygen started on a mild pathway with remdesivir and Decadron  Pneumonia was found was also found to have new onset atrial fibrillation with RVR started on rate control and heparin drip with eventual transition to Eliquis prior to discharge as he was on room air. Cardiology consulted TTE some decreased EF he is metoprolol was adjusted he was added digoxin the rates were controlled he will be discharged on digoxin 125 mcg daily and Toprol tartrate 100 mg p.o. twice daily with Eliquis he needs to follow-up with his cardiology at [facility]. Some COPD exacerbation secondary to COVID which has improved he is good to be discharged on steroid taper unfortunately his insurance To Cover A Lot Of Inhalers for Maintenance Therefore for Now We Will Put Him on Pulmicort Nebulizers Seems to Cover At Least till the COVID Completely Resolves.   Follow-Up with Pulmonology As Well  Otherwise he is medical cleared to be discharged        Please see above list of diagnoses and related plan for additional information. Condition at Discharge: stable    Discharge Day Visit / Exam:   Subjective:  seen and examined no complaints  Vitals: Blood Pressure: 136/67 (11/03/23 0735)  Pulse: 55 (11/03/23 0735)  Temperature: 97.7 °F (36.5 °C) (11/03/23 0735)  Temp Source: Oral (11/03/23 0735)  Respirations: 18 (11/03/23 0735)  Height: 6' 1" (185.4 cm) (10/30/23 0830)  Weight - Scale: (!) 148 kg (327 lb) (10/30/23 0830)  SpO2: 97 % (11/03/23 0735)  Exam:   Physical Exam  Vitals and nursing note reviewed. Constitutional:       General: He is not in acute distress. Appearance: He is well-developed. HENT:      Head: Normocephalic and atraumatic. Eyes:      Conjunctiva/sclera: Conjunctivae normal.   Cardiovascular:      Rate and Rhythm: Normal rate. Rhythm irregular. Heart sounds: No murmur heard. Pulmonary:      Effort: Pulmonary effort is normal. No respiratory distress. Breath sounds: Wheezing (mild exp wheezing) present. No rales. Abdominal:      Palpations: Abdomen is soft. Tenderness: There is no abdominal tenderness. Musculoskeletal:         General: Swelling (mild) present. Cervical back: Neck supple. Skin:     General: Skin is warm and dry. Capillary Refill: Capillary refill takes less than 2 seconds. Neurological:      Mental Status: He is alert and oriented to person, place, and time. Psychiatric:         Mood and Affect: Mood normal.          Discussion with Family: Updated  (wife) at bedside. Discharge instructions/Information to patient and family:   See after visit summary for information provided to patient and family. Provisions for Follow-Up Care:  See after visit summary for information related to follow-up care and any pertinent home health orders.        Disposition:   Home    Planned Readmission: no     Discharge Statement:  I spent >35 minutes discharging the patient. This time was spent on the day of discharge. I had direct contact with the patient on the day of discharge. Greater than 50% of the total time was spent examining patient, answering all patient questions, arranging and discussing plan of care with patient as well as directly providing post-discharge instructions. Additional time then spent on discharge activities. Discharge Medications:  See after visit summary for reconciled discharge medications provided to patient and/or family.       **Please Note: This note may have been constructed using a voice recognition system**

## 2023-11-03 NOTE — ASSESSMENT & PLAN NOTE
Status post RLL lobectomy in 12/2018  Follows with Oncology at [facility]  Follow-up outpatient CT scan per pulmonary/oncology recommendations.

## 2023-11-03 NOTE — PLAN OF CARE
Problem: PAIN - ADULT  Goal: Verbalizes/displays adequate comfort level or baseline comfort level  Description: Interventions:  - Encourage patient to monitor pain and request assistance  - Assess pain using appropriate pain scale  - Administer analgesics based on type and severity of pain and evaluate response  - Implement non-pharmacological measures as appropriate and evaluate response  - Consider cultural and social influences on pain and pain management  - Notify physician/advanced practitioner if interventions unsuccessful or patient reports new pain  Outcome: Progressing     Problem: INFECTION - ADULT  Goal: Absence or prevention of progression during hospitalization  Description: INTERVENTIONS:  - Assess and monitor for signs and symptoms of infection  - Monitor lab/diagnostic results  - Monitor all insertion sites, i.e. indwelling lines, tubes, and drains  - Monitor endotracheal if appropriate and nasal secretions for changes in amount and color  - [place] appropriate cooling/warming therapies per order  - Administer medications as ordered  - Instruct and encourage patient and family to use good hand hygiene technique  - Identify and instruct in appropriate isolation precautions for identified infection/condition  Outcome: Progressing     Problem: SAFETY ADULT  Goal: Patient will remain free of falls  Description: INTERVENTIONS:  - Educate patient/family on patient safety including physical limitations  - Instruct patient to call for assistance with activity   - Consult OT/PT to assist with strengthening/mobility   - Keep Call bell within reach  - Keep bed low and locked with side rails adjusted as appropriate  - Keep care items and personal belongings within reach  - Initiate and maintain comfort rounds  - Make Fall Risk Sign visible to staff  - Offer Toileting every 2 Hours, in advance of need  - Initiate/Maintain na alarm  - Obtain necessary fall risk management equipment: na  - Apply yellow socks and bracelet for high fall risk patients  - Consider moving patient to room near nurses station  Outcome: Progressing     Problem: Potential for Falls  Goal: Patient will remain free of falls  Description: INTERVENTIONS:  - Educate patient/family on patient safety including physical limitations  - Instruct patient to call for assistance with activity   - Consult OT/PT to assist with strengthening/mobility   - Keep Call bell within reach  - Keep bed low and locked with side rails adjusted as appropriate  - Keep care items and personal belongings within reach  - Initiate and maintain comfort rounds  - Make Fall Risk Sign visible to staff  - Offer Toileting every 2 Hours, in advance of need  - Initiate/Maintain na alarm  - Obtain necessary fall risk management equipment: na  - Apply yellow socks and bracelet for high fall risk patients  - Consider moving patient to room near nurses station  Outcome: Progressing

## 2023-11-03 NOTE — PROGRESS NOTES
Reviewed telemetry, labs. His A-fib rates have been controlled, sometimes bradycardic. Would decrease metoprolol to 100 mg twice a day. Continue digoxin 0.125 mg daily. Discussed with hospitalist.  Would be okay for discharge from a cardiac standpoint. Should follow-up with his outpatient cardiologist at [facility] to see if his A-fib remains persistent and needs outpatient cardioversion. Does not feel his atrial fibrillation. Trying to avoid WILLARD guided cardioversion given that he is still recovering/isolation from his COVID.   We will sign off, please call if needed

## 2023-11-06 ENCOUNTER — APPOINTMENT (OUTPATIENT)
Dept: LAB | Facility: HOSPITAL | Age: 73
End: 2023-11-06
Payer: MEDICARE

## 2023-11-06 DIAGNOSIS — I48.91 RAPID ATRIAL FIBRILLATION (HCC): ICD-10-CM

## 2023-11-06 LAB — DIGOXIN SERPL-MCNC: <0.5 NG/ML (ref 0.8–2)

## 2023-11-06 PROCEDURE — 36415 COLL VENOUS BLD VENIPUNCTURE: CPT

## 2023-11-06 PROCEDURE — 80162 ASSAY OF DIGOXIN TOTAL: CPT

## 2023-11-09 ENCOUNTER — APPOINTMENT (OUTPATIENT)
Dept: LAB | Facility: HOSPITAL | Age: 73
End: 2023-11-09
Payer: MEDICARE

## 2023-11-09 DIAGNOSIS — I48.19 PERSISTENT ATRIAL FIBRILLATION (HCC): ICD-10-CM

## 2023-11-09 DIAGNOSIS — Z01.812 PRE-OPERATIVE LABORATORY EXAMINATION: ICD-10-CM

## 2023-11-09 LAB
ANION GAP SERPL CALCULATED.3IONS-SCNC: 6 MMOL/L
BNP SERPL-MCNC: 103 PG/ML (ref 0–100)
BUN SERPL-MCNC: 33 MG/DL (ref 5–25)
CALCIUM SERPL-MCNC: 9 MG/DL (ref 8.4–10.2)
CHLORIDE SERPL-SCNC: 101 MMOL/L (ref 96–108)
CO2 SERPL-SCNC: 31 MMOL/L (ref 21–32)
CREAT SERPL-MCNC: 1.15 MG/DL (ref 0.6–1.3)
DIGOXIN SERPL-MCNC: 0.6 NG/ML (ref 0.8–2)
ERYTHROCYTE [DISTWIDTH] IN BLOOD BY AUTOMATED COUNT: 13.2 % (ref 11.6–15.1)
GFR SERPL CREATININE-BSD FRML MDRD: 62 ML/MIN/1.73SQ M
GLUCOSE P FAST SERPL-MCNC: 138 MG/DL (ref 65–99)
HCT VFR BLD AUTO: 52.9 % (ref 36.5–49.3)
HGB BLD-MCNC: 17.1 G/DL (ref 12–17)
MAGNESIUM SERPL-MCNC: 2.2 MG/DL (ref 1.9–2.7)
MCH RBC QN AUTO: 30.1 PG (ref 26.8–34.3)
MCHC RBC AUTO-ENTMCNC: 32.3 G/DL (ref 31.4–37.4)
MCV RBC AUTO: 93 FL (ref 82–98)
PLATELET # BLD AUTO: 306 THOUSANDS/UL (ref 149–390)
PMV BLD AUTO: 9.7 FL (ref 8.9–12.7)
POTASSIUM SERPL-SCNC: 4.3 MMOL/L (ref 3.5–5.3)
RBC # BLD AUTO: 5.68 MILLION/UL (ref 3.88–5.62)
SODIUM SERPL-SCNC: 138 MMOL/L (ref 135–147)
T4 FREE SERPL-MCNC: 0.87 NG/DL (ref 0.61–1.12)
TSH SERPL DL<=0.05 MIU/L-ACNC: 1.99 UIU/ML (ref 0.45–4.5)
WBC # BLD AUTO: 17.49 THOUSAND/UL (ref 4.31–10.16)

## 2023-11-09 PROCEDURE — 80162 ASSAY OF DIGOXIN TOTAL: CPT

## 2023-11-09 PROCEDURE — 80048 BASIC METABOLIC PNL TOTAL CA: CPT

## 2023-11-09 PROCEDURE — 83735 ASSAY OF MAGNESIUM: CPT

## 2023-11-09 PROCEDURE — 83880 ASSAY OF NATRIURETIC PEPTIDE: CPT

## 2023-11-09 PROCEDURE — 85027 COMPLETE CBC AUTOMATED: CPT

## 2023-11-09 PROCEDURE — 84443 ASSAY THYROID STIM HORMONE: CPT

## 2023-11-09 PROCEDURE — 84439 ASSAY OF FREE THYROXINE: CPT

## 2023-11-09 PROCEDURE — 36415 COLL VENOUS BLD VENIPUNCTURE: CPT

## 2023-11-16 ENCOUNTER — APPOINTMENT (OUTPATIENT)
Dept: LAB | Facility: HOSPITAL | Age: 73
End: 2023-11-16
Payer: MEDICARE

## 2023-11-16 DIAGNOSIS — D72.829 LEUKOCYTOSIS, UNSPECIFIED TYPE: ICD-10-CM

## 2023-11-16 LAB
ACANTHOCYTES BLD QL SMEAR: PRESENT
BASOPHILS # BLD MANUAL: 0 THOUSAND/UL (ref 0–0.1)
BASOPHILS NFR MAR MANUAL: 0 % (ref 0–1)
EOSINOPHIL # BLD MANUAL: 0.23 THOUSAND/UL (ref 0–0.4)
EOSINOPHIL NFR BLD MANUAL: 2 % (ref 0–6)
ERYTHROCYTE [DISTWIDTH] IN BLOOD BY AUTOMATED COUNT: 13.2 % (ref 11.6–15.1)
HCT VFR BLD AUTO: 46.4 % (ref 36.5–49.3)
HGB BLD-MCNC: 14.9 G/DL (ref 12–17)
LG PLATELETS BLD QL SMEAR: PRESENT
LYMPHOCYTES # BLD AUTO: 1.36 THOUSAND/UL (ref 0.6–4.47)
LYMPHOCYTES # BLD AUTO: 8 % (ref 14–44)
MCH RBC QN AUTO: 30.3 PG (ref 26.8–34.3)
MCHC RBC AUTO-ENTMCNC: 32.1 G/DL (ref 31.4–37.4)
MCV RBC AUTO: 94 FL (ref 82–98)
MONOCYTES # BLD AUTO: 0.91 THOUSAND/UL (ref 0–1.22)
MONOCYTES NFR BLD: 8 % (ref 4–12)
NEUTROPHILS # BLD MANUAL: 8.85 THOUSAND/UL (ref 1.85–7.62)
NEUTS SEG NFR BLD AUTO: 78 % (ref 43–75)
PLATELET # BLD AUTO: 280 THOUSANDS/UL (ref 149–390)
PLATELET BLD QL SMEAR: ADEQUATE
PMV BLD AUTO: 9.6 FL (ref 8.9–12.7)
RBC # BLD AUTO: 4.92 MILLION/UL (ref 3.88–5.62)
VARIANT LYMPHS # BLD AUTO: 4 %
WBC # BLD AUTO: 11.34 THOUSAND/UL (ref 4.31–10.16)

## 2023-11-16 PROCEDURE — 36415 COLL VENOUS BLD VENIPUNCTURE: CPT

## 2023-11-16 PROCEDURE — 85027 COMPLETE CBC AUTOMATED: CPT

## 2023-11-16 PROCEDURE — 85007 BL SMEAR W/DIFF WBC COUNT: CPT

## 2023-11-28 ENCOUNTER — APPOINTMENT (OUTPATIENT)
Dept: LAB | Facility: HOSPITAL | Age: 73
End: 2023-11-28
Payer: MEDICARE

## 2023-11-28 DIAGNOSIS — I10 ESSENTIAL HYPERTENSION: ICD-10-CM

## 2023-11-28 DIAGNOSIS — C34.31 MALIGNANT NEOPLASM OF BRONCHUS OF RIGHT LOWER LOBE (HCC): ICD-10-CM

## 2023-11-28 DIAGNOSIS — J43.9 PULMONARY EMPHYSEMA, UNSPECIFIED EMPHYSEMA TYPE (HCC): ICD-10-CM

## 2023-11-28 LAB
ALBUMIN SERPL BCP-MCNC: 3.9 G/DL (ref 3.5–5)
ALP SERPL-CCNC: 57 U/L (ref 34–104)
ALT SERPL W P-5'-P-CCNC: 18 U/L (ref 7–52)
ANION GAP SERPL CALCULATED.3IONS-SCNC: 6 MMOL/L
AST SERPL W P-5'-P-CCNC: 20 U/L (ref 13–39)
BASOPHILS # BLD AUTO: 0.03 THOUSANDS/ÂΜL (ref 0–0.1)
BASOPHILS NFR BLD AUTO: 0 % (ref 0–1)
BILIRUB SERPL-MCNC: 2.33 MG/DL (ref 0.2–1)
BUN SERPL-MCNC: 21 MG/DL (ref 5–25)
CALCIUM SERPL-MCNC: 9.1 MG/DL (ref 8.4–10.2)
CHLORIDE SERPL-SCNC: 102 MMOL/L (ref 96–108)
CO2 SERPL-SCNC: 29 MMOL/L (ref 21–32)
CREAT SERPL-MCNC: 0.97 MG/DL (ref 0.6–1.3)
EOSINOPHIL # BLD AUTO: 0.26 THOUSAND/ÂΜL (ref 0–0.61)
EOSINOPHIL NFR BLD AUTO: 3 % (ref 0–6)
ERYTHROCYTE [DISTWIDTH] IN BLOOD BY AUTOMATED COUNT: 13.6 % (ref 11.6–15.1)
GFR SERPL CREATININE-BSD FRML MDRD: 76 ML/MIN/1.73SQ M
GLUCOSE P FAST SERPL-MCNC: 139 MG/DL (ref 65–99)
HCT VFR BLD AUTO: 44.1 % (ref 36.5–49.3)
HGB BLD-MCNC: 13.9 G/DL (ref 12–17)
IMM GRANULOCYTES # BLD AUTO: 0.06 THOUSAND/UL (ref 0–0.2)
IMM GRANULOCYTES NFR BLD AUTO: 1 % (ref 0–2)
LYMPHOCYTES # BLD AUTO: 1.75 THOUSANDS/ÂΜL (ref 0.6–4.47)
LYMPHOCYTES NFR BLD AUTO: 19 % (ref 14–44)
MCH RBC QN AUTO: 30 PG (ref 26.8–34.3)
MCHC RBC AUTO-ENTMCNC: 31.5 G/DL (ref 31.4–37.4)
MCV RBC AUTO: 95 FL (ref 82–98)
MONOCYTES # BLD AUTO: 0.89 THOUSAND/ÂΜL (ref 0.17–1.22)
MONOCYTES NFR BLD AUTO: 10 % (ref 4–12)
NEUTROPHILS # BLD AUTO: 6.18 THOUSANDS/ÂΜL (ref 1.85–7.62)
NEUTS SEG NFR BLD AUTO: 67 % (ref 43–75)
NRBC BLD AUTO-RTO: 0 /100 WBCS
PLATELET # BLD AUTO: 310 THOUSANDS/UL (ref 149–390)
PMV BLD AUTO: 9.2 FL (ref 8.9–12.7)
POTASSIUM SERPL-SCNC: 4.4 MMOL/L (ref 3.5–5.3)
PROT SERPL-MCNC: 6.8 G/DL (ref 6.4–8.4)
RBC # BLD AUTO: 4.64 MILLION/UL (ref 3.88–5.62)
SODIUM SERPL-SCNC: 137 MMOL/L (ref 135–147)
WBC # BLD AUTO: 9.17 THOUSAND/UL (ref 4.31–10.16)

## 2023-11-28 PROCEDURE — 85025 COMPLETE CBC W/AUTO DIFF WBC: CPT

## 2023-11-28 PROCEDURE — 80053 COMPREHEN METABOLIC PANEL: CPT

## 2023-11-28 PROCEDURE — 36415 COLL VENOUS BLD VENIPUNCTURE: CPT

## 2023-12-21 ENCOUNTER — APPOINTMENT (OUTPATIENT)
Dept: LAB | Facility: HOSPITAL | Age: 73
End: 2023-12-21
Payer: MEDICARE

## 2023-12-21 DIAGNOSIS — I50.32 HEART FAILURE, DIASTOLIC, CHRONIC (HCC): ICD-10-CM

## 2023-12-21 DIAGNOSIS — I25.10 CORONARY ARTERY DISEASE WITHOUT ANGINA PECTORIS, UNSPECIFIED VESSEL OR LESION TYPE, UNSPECIFIED WHETHER NATIVE OR TRANSPLANTED HEART: ICD-10-CM

## 2023-12-21 LAB
ALT SERPL W P-5'-P-CCNC: 14 U/L (ref 7–52)
ANION GAP SERPL CALCULATED.3IONS-SCNC: 5 MMOL/L
AST SERPL W P-5'-P-CCNC: 19 U/L (ref 13–39)
BNP SERPL-MCNC: 82 PG/ML (ref 0–100)
BUN SERPL-MCNC: 16 MG/DL (ref 5–25)
CALCIUM SERPL-MCNC: 9.1 MG/DL (ref 8.4–10.2)
CHLORIDE SERPL-SCNC: 103 MMOL/L (ref 96–108)
CHOLEST SERPL-MCNC: 111 MG/DL
CO2 SERPL-SCNC: 32 MMOL/L (ref 21–32)
CREAT SERPL-MCNC: 1.02 MG/DL (ref 0.6–1.3)
ERYTHROCYTE [DISTWIDTH] IN BLOOD BY AUTOMATED COUNT: 13.8 % (ref 11.6–15.1)
GFR SERPL CREATININE-BSD FRML MDRD: 72 ML/MIN/1.73SQ M
GLUCOSE P FAST SERPL-MCNC: 125 MG/DL (ref 65–99)
HCT VFR BLD AUTO: 43.5 % (ref 36.5–49.3)
HDLC SERPL-MCNC: 44 MG/DL
HGB BLD-MCNC: 13.9 G/DL (ref 12–17)
LDLC SERPL CALC-MCNC: 53 MG/DL (ref 0–100)
MAGNESIUM SERPL-MCNC: 1.9 MG/DL (ref 1.9–2.7)
MCH RBC QN AUTO: 30.4 PG (ref 26.8–34.3)
MCHC RBC AUTO-ENTMCNC: 32 G/DL (ref 31.4–37.4)
MCV RBC AUTO: 95 FL (ref 82–98)
PLATELET # BLD AUTO: 276 THOUSANDS/UL (ref 149–390)
PMV BLD AUTO: 9.3 FL (ref 8.9–12.7)
POTASSIUM SERPL-SCNC: 3.9 MMOL/L (ref 3.5–5.3)
RBC # BLD AUTO: 4.57 MILLION/UL (ref 3.88–5.62)
SODIUM SERPL-SCNC: 140 MMOL/L (ref 135–147)
TRIGL SERPL-MCNC: 70 MG/DL
WBC # BLD AUTO: 10.16 THOUSAND/UL (ref 4.31–10.16)

## 2023-12-21 PROCEDURE — 85027 COMPLETE CBC AUTOMATED: CPT

## 2023-12-21 PROCEDURE — 80061 LIPID PANEL: CPT

## 2023-12-21 PROCEDURE — 80048 BASIC METABOLIC PNL TOTAL CA: CPT

## 2023-12-21 PROCEDURE — 83735 ASSAY OF MAGNESIUM: CPT

## 2023-12-21 PROCEDURE — 84460 ALANINE AMINO (ALT) (SGPT): CPT

## 2023-12-21 PROCEDURE — 84450 TRANSFERASE (AST) (SGOT): CPT

## 2023-12-21 PROCEDURE — 83880 ASSAY OF NATRIURETIC PEPTIDE: CPT

## 2023-12-21 PROCEDURE — 36415 COLL VENOUS BLD VENIPUNCTURE: CPT

## 2024-03-18 ENCOUNTER — APPOINTMENT (OUTPATIENT)
Dept: LAB | Facility: HOSPITAL | Age: 74
End: 2024-03-18
Payer: MEDICARE

## 2024-03-18 DIAGNOSIS — I49.5 TACHY-BRADY SYNDROME (HCC): ICD-10-CM

## 2024-03-18 LAB
ALT SERPL W P-5'-P-CCNC: 17 U/L (ref 7–52)
ANION GAP SERPL CALCULATED.3IONS-SCNC: 6 MMOL/L (ref 4–13)
AST SERPL W P-5'-P-CCNC: 20 U/L (ref 13–39)
BUN SERPL-MCNC: 17 MG/DL (ref 5–25)
CALCIUM SERPL-MCNC: 9.5 MG/DL (ref 8.4–10.2)
CHLORIDE SERPL-SCNC: 101 MMOL/L (ref 96–108)
CHOLEST SERPL-MCNC: 108 MG/DL
CO2 SERPL-SCNC: 31 MMOL/L (ref 21–32)
CREAT SERPL-MCNC: 0.89 MG/DL (ref 0.6–1.3)
ERYTHROCYTE [DISTWIDTH] IN BLOOD BY AUTOMATED COUNT: 12.6 % (ref 11.6–15.1)
GFR SERPL CREATININE-BSD FRML MDRD: 84 ML/MIN/1.73SQ M
GLUCOSE P FAST SERPL-MCNC: 117 MG/DL (ref 65–99)
HCT VFR BLD AUTO: 44.7 % (ref 36.5–49.3)
HDLC SERPL-MCNC: 45 MG/DL
HGB BLD-MCNC: 14 G/DL (ref 12–17)
LDLC SERPL CALC-MCNC: 51 MG/DL (ref 0–100)
MCH RBC QN AUTO: 30.2 PG (ref 26.8–34.3)
MCHC RBC AUTO-ENTMCNC: 31.3 G/DL (ref 31.4–37.4)
MCV RBC AUTO: 96 FL (ref 82–98)
PLATELET # BLD AUTO: 285 THOUSANDS/UL (ref 149–390)
PMV BLD AUTO: 9.1 FL (ref 8.9–12.7)
POTASSIUM SERPL-SCNC: 4 MMOL/L (ref 3.5–5.3)
RBC # BLD AUTO: 4.64 MILLION/UL (ref 3.88–5.62)
SODIUM SERPL-SCNC: 138 MMOL/L (ref 135–147)
TRIGL SERPL-MCNC: 59 MG/DL
WBC # BLD AUTO: 9.47 THOUSAND/UL (ref 4.31–10.16)

## 2024-03-18 PROCEDURE — 80048 BASIC METABOLIC PNL TOTAL CA: CPT

## 2024-03-18 PROCEDURE — 36415 COLL VENOUS BLD VENIPUNCTURE: CPT

## 2024-03-18 PROCEDURE — 84450 TRANSFERASE (AST) (SGOT): CPT

## 2024-03-18 PROCEDURE — 85027 COMPLETE CBC AUTOMATED: CPT

## 2024-03-18 PROCEDURE — 84460 ALANINE AMINO (ALT) (SGPT): CPT

## 2024-03-18 PROCEDURE — 80061 LIPID PANEL: CPT

## 2024-03-27 ENCOUNTER — APPOINTMENT (OUTPATIENT)
Dept: LAB | Facility: HOSPITAL | Age: 74
End: 2024-03-27
Payer: MEDICARE

## 2024-03-27 DIAGNOSIS — Z01.812 PRE-OPERATIVE LABORATORY EXAMINATION: ICD-10-CM

## 2024-03-27 DIAGNOSIS — M89.9 DISORDER OF BONE, UNSPECIFIED: ICD-10-CM

## 2024-03-27 DIAGNOSIS — M25.562 LEFT KNEE PAIN, UNSPECIFIED CHRONICITY: ICD-10-CM

## 2024-03-27 DIAGNOSIS — Z12.5 SPECIAL SCREENING FOR MALIGNANT NEOPLASM OF PROSTATE: ICD-10-CM

## 2024-03-27 DIAGNOSIS — E11.69 DIABETES MELLITUS ASSOCIATED WITH HORMONAL ETIOLOGY (HCC): ICD-10-CM

## 2024-03-27 DIAGNOSIS — I10 ESSENTIAL HYPERTENSION, MALIGNANT: ICD-10-CM

## 2024-03-27 DIAGNOSIS — E27.8 ABNORMALITY OF CORTISOL-BINDING GLOBULIN (HCC): ICD-10-CM

## 2024-03-27 DIAGNOSIS — M17.12 PRIMARY OSTEOARTHRITIS OF LEFT KNEE: ICD-10-CM

## 2024-03-27 DIAGNOSIS — E78.2 MIXED HYPERLIPIDEMIA: ICD-10-CM

## 2024-03-27 LAB
25(OH)D3 SERPL-MCNC: 33.3 NG/ML (ref 30–100)
ALBUMIN SERPL BCP-MCNC: 4.1 G/DL (ref 3.5–5)
ALP SERPL-CCNC: 70 U/L (ref 34–104)
ALT SERPL W P-5'-P-CCNC: 14 U/L (ref 7–52)
ANION GAP SERPL CALCULATED.3IONS-SCNC: 7 MMOL/L (ref 4–13)
AST SERPL W P-5'-P-CCNC: 18 U/L (ref 13–39)
BACTERIA UR QL AUTO: ABNORMAL /HPF
BASOPHILS # BLD AUTO: 0.04 THOUSANDS/ÂΜL (ref 0–0.1)
BASOPHILS NFR BLD AUTO: 0 % (ref 0–1)
BILIRUB SERPL-MCNC: 1.99 MG/DL (ref 0.2–1)
BILIRUB UR QL STRIP: NEGATIVE
BUN SERPL-MCNC: 26 MG/DL (ref 5–25)
CALCIUM SERPL-MCNC: 9.3 MG/DL (ref 8.4–10.2)
CHLORIDE SERPL-SCNC: 102 MMOL/L (ref 96–108)
CHOLEST SERPL-MCNC: 118 MG/DL
CLARITY UR: CLEAR
CO2 SERPL-SCNC: 30 MMOL/L (ref 21–32)
COLOR UR: YELLOW
CREAT SERPL-MCNC: 0.9 MG/DL (ref 0.6–1.3)
CREAT UR-MCNC: 62.9 MG/DL
EOSINOPHIL # BLD AUTO: 0.23 THOUSAND/ÂΜL (ref 0–0.61)
EOSINOPHIL NFR BLD AUTO: 2 % (ref 0–6)
ERYTHROCYTE [DISTWIDTH] IN BLOOD BY AUTOMATED COUNT: 12.7 % (ref 11.6–15.1)
EST. AVERAGE GLUCOSE BLD GHB EST-MCNC: 143 MG/DL
FOLATE SERPL-MCNC: 8.9 NG/ML
GFR SERPL CREATININE-BSD FRML MDRD: 83 ML/MIN/1.73SQ M
GLUCOSE P FAST SERPL-MCNC: 116 MG/DL (ref 65–99)
GLUCOSE UR STRIP-MCNC: NEGATIVE MG/DL
HBA1C MFR BLD: 6.6 %
HCT VFR BLD AUTO: 44.8 % (ref 36.5–49.3)
HDLC SERPL-MCNC: 49 MG/DL
HGB BLD-MCNC: 14.1 G/DL (ref 12–17)
HGB UR QL STRIP.AUTO: NEGATIVE
IMM GRANULOCYTES # BLD AUTO: 0.03 THOUSAND/UL (ref 0–0.2)
IMM GRANULOCYTES NFR BLD AUTO: 0 % (ref 0–2)
KETONES UR STRIP-MCNC: NEGATIVE MG/DL
LDLC SERPL CALC-MCNC: 54 MG/DL (ref 0–100)
LEUKOCYTE ESTERASE UR QL STRIP: NEGATIVE
LYMPHOCYTES # BLD AUTO: 1.56 THOUSANDS/ÂΜL (ref 0.6–4.47)
LYMPHOCYTES NFR BLD AUTO: 16 % (ref 14–44)
MCH RBC QN AUTO: 29.9 PG (ref 26.8–34.3)
MCHC RBC AUTO-ENTMCNC: 31.5 G/DL (ref 31.4–37.4)
MCV RBC AUTO: 95 FL (ref 82–98)
MICROALBUMIN UR-MCNC: 10.4 MG/L
MICROALBUMIN/CREAT 24H UR: 17 MG/G CREATININE (ref 0–30)
MONOCYTES # BLD AUTO: 1.11 THOUSAND/ÂΜL (ref 0.17–1.22)
MONOCYTES NFR BLD AUTO: 11 % (ref 4–12)
NEUTROPHILS # BLD AUTO: 6.88 THOUSANDS/ÂΜL (ref 1.85–7.62)
NEUTS SEG NFR BLD AUTO: 71 % (ref 43–75)
NITRITE UR QL STRIP: NEGATIVE
NON-SQ EPI CELLS URNS QL MICRO: ABNORMAL /HPF
NONHDLC SERPL-MCNC: 69 MG/DL
NRBC BLD AUTO-RTO: 0 /100 WBCS
PH UR STRIP.AUTO: 6 [PH]
PLATELET # BLD AUTO: 304 THOUSANDS/UL (ref 149–390)
PMV BLD AUTO: 9.3 FL (ref 8.9–12.7)
POTASSIUM SERPL-SCNC: 4.3 MMOL/L (ref 3.5–5.3)
PROT SERPL-MCNC: 7.2 G/DL (ref 6.4–8.4)
PROT UR STRIP-MCNC: NEGATIVE MG/DL
PSA SERPL-MCNC: 0.42 NG/ML (ref 0–4)
RBC # BLD AUTO: 4.71 MILLION/UL (ref 3.88–5.62)
RBC #/AREA URNS AUTO: ABNORMAL /HPF
SODIUM SERPL-SCNC: 139 MMOL/L (ref 135–147)
SP GR UR STRIP.AUTO: 1.02 (ref 1–1.03)
T4 FREE SERPL-MCNC: 0.77 NG/DL (ref 0.61–1.12)
TRIGL SERPL-MCNC: 74 MG/DL
TSH SERPL DL<=0.05 MIU/L-ACNC: 1.73 UIU/ML (ref 0.45–4.5)
URATE SERPL-MCNC: 6.6 MG/DL (ref 3.5–8.5)
UROBILINOGEN UR QL STRIP.AUTO: 0.2 E.U./DL
VIT B12 SERPL-MCNC: 243 PG/ML (ref 180–914)
WBC # BLD AUTO: 9.85 THOUSAND/UL (ref 4.31–10.16)
WBC #/AREA URNS AUTO: ABNORMAL /HPF

## 2024-03-27 PROCEDURE — 80053 COMPREHEN METABOLIC PANEL: CPT

## 2024-03-27 PROCEDURE — G0103 PSA SCREENING: HCPCS

## 2024-03-27 PROCEDURE — 84439 ASSAY OF FREE THYROXINE: CPT

## 2024-03-27 PROCEDURE — 80061 LIPID PANEL: CPT

## 2024-03-27 PROCEDURE — 36415 COLL VENOUS BLD VENIPUNCTURE: CPT

## 2024-03-27 PROCEDURE — 84443 ASSAY THYROID STIM HORMONE: CPT

## 2024-03-27 PROCEDURE — 82607 VITAMIN B-12: CPT

## 2024-03-27 PROCEDURE — 82306 VITAMIN D 25 HYDROXY: CPT

## 2024-03-27 PROCEDURE — 85025 COMPLETE CBC W/AUTO DIFF WBC: CPT

## 2024-03-27 PROCEDURE — 82746 ASSAY OF FOLIC ACID SERUM: CPT

## 2024-03-27 PROCEDURE — 82043 UR ALBUMIN QUANTITATIVE: CPT

## 2024-03-27 PROCEDURE — 83036 HEMOGLOBIN GLYCOSYLATED A1C: CPT

## 2024-03-27 PROCEDURE — 82570 ASSAY OF URINE CREATININE: CPT

## 2024-03-27 PROCEDURE — 81001 URINALYSIS AUTO W/SCOPE: CPT

## 2024-03-27 PROCEDURE — 84550 ASSAY OF BLOOD/URIC ACID: CPT

## 2024-05-15 ENCOUNTER — APPOINTMENT (EMERGENCY)
Dept: CT IMAGING | Facility: HOSPITAL | Age: 74
DRG: 871 | End: 2024-05-15
Payer: MEDICARE

## 2024-05-15 ENCOUNTER — HOSPITAL ENCOUNTER (INPATIENT)
Facility: HOSPITAL | Age: 74
LOS: 2 days | Discharge: HOME | DRG: 871 | End: 2024-05-18
Attending: EMERGENCY MEDICINE | Admitting: FAMILY MEDICINE
Payer: MEDICARE

## 2024-05-15 DIAGNOSIS — S32.9XXA PELVIC FRACTURE (HCC): ICD-10-CM

## 2024-05-15 DIAGNOSIS — J44.0 CHRONIC OBSTRUCTIVE PULMONARY DISEASE WITH ACUTE LOWER RESPIRATORY INFECTION (HCC): ICD-10-CM

## 2024-05-15 DIAGNOSIS — J18.9 PNEUMONIA: Primary | ICD-10-CM

## 2024-05-15 DIAGNOSIS — I95.9 HYPOTENSION: ICD-10-CM

## 2024-05-15 DIAGNOSIS — R06.02 SHORTNESS OF BREATH: ICD-10-CM

## 2024-05-15 DIAGNOSIS — M79.601 PAIN OF RIGHT UPPER EXTREMITY: ICD-10-CM

## 2024-05-15 DIAGNOSIS — E80.6 HYPERBILIRUBINEMIA: ICD-10-CM

## 2024-05-15 LAB
ALBUMIN SERPL BCP-MCNC: 3.7 G/DL (ref 3.5–5)
ALP SERPL-CCNC: 60 U/L (ref 34–104)
ALT SERPL W P-5'-P-CCNC: 14 U/L (ref 7–52)
ANION GAP SERPL CALCULATED.3IONS-SCNC: 9 MMOL/L (ref 4–13)
APTT PPP: 50 SECONDS (ref 23–37)
AST SERPL W P-5'-P-CCNC: 16 U/L (ref 13–39)
BASOPHILS # BLD AUTO: 0.06 THOUSANDS/ÂΜL (ref 0–0.1)
BASOPHILS NFR BLD AUTO: 0 % (ref 0–1)
BILIRUB SERPL-MCNC: 3.41 MG/DL (ref 0.2–1)
BNP SERPL-MCNC: 285 PG/ML (ref 0–100)
BUN SERPL-MCNC: 28 MG/DL (ref 5–25)
CALCIUM SERPL-MCNC: 8.5 MG/DL (ref 8.4–10.2)
CARDIAC TROPONIN I PNL SERPL HS: 15 NG/L
CHLORIDE SERPL-SCNC: 100 MMOL/L (ref 96–108)
CO2 SERPL-SCNC: 26 MMOL/L (ref 21–32)
CREAT SERPL-MCNC: 1.2 MG/DL (ref 0.6–1.3)
EOSINOPHIL # BLD AUTO: 0.21 THOUSAND/ÂΜL (ref 0–0.61)
EOSINOPHIL NFR BLD AUTO: 1 % (ref 0–6)
ERYTHROCYTE [DISTWIDTH] IN BLOOD BY AUTOMATED COUNT: 15 % (ref 11.6–15.1)
FLUAV RNA RESP QL NAA+PROBE: NEGATIVE
FLUBV RNA RESP QL NAA+PROBE: NEGATIVE
GFR SERPL CREATININE-BSD FRML MDRD: 59 ML/MIN/1.73SQ M
GLUCOSE SERPL-MCNC: 174 MG/DL (ref 65–140)
HCT VFR BLD AUTO: 33.1 % (ref 36.5–49.3)
HGB BLD-MCNC: 10.3 G/DL (ref 12–17)
IMM GRANULOCYTES # BLD AUTO: 0.09 THOUSAND/UL (ref 0–0.2)
IMM GRANULOCYTES NFR BLD AUTO: 1 % (ref 0–2)
INR PPP: 3 (ref 0.84–1.19)
LACTATE SERPL-SCNC: 1.9 MMOL/L (ref 0.5–2)
LYMPHOCYTES # BLD AUTO: 1.89 THOUSANDS/ÂΜL (ref 0.6–4.47)
LYMPHOCYTES NFR BLD AUTO: 11 % (ref 14–44)
MCH RBC QN AUTO: 28.9 PG (ref 26.8–34.3)
MCHC RBC AUTO-ENTMCNC: 31.1 G/DL (ref 31.4–37.4)
MCV RBC AUTO: 93 FL (ref 82–98)
MONOCYTES # BLD AUTO: 1.79 THOUSAND/ÂΜL (ref 0.17–1.22)
MONOCYTES NFR BLD AUTO: 10 % (ref 4–12)
NEUTROPHILS # BLD AUTO: 13.62 THOUSANDS/ÂΜL (ref 1.85–7.62)
NEUTS SEG NFR BLD AUTO: 77 % (ref 43–75)
NRBC BLD AUTO-RTO: 0 /100 WBCS
PLATELET # BLD AUTO: 405 THOUSANDS/UL (ref 149–390)
PMV BLD AUTO: 8.9 FL (ref 8.9–12.7)
POTASSIUM SERPL-SCNC: 3.8 MMOL/L (ref 3.5–5.3)
PROT SERPL-MCNC: 7.1 G/DL (ref 6.4–8.4)
PROTHROMBIN TIME: 31.4 SECONDS (ref 11.6–14.5)
RBC # BLD AUTO: 3.56 MILLION/UL (ref 3.88–5.62)
RSV RNA RESP QL NAA+PROBE: NEGATIVE
SARS-COV-2 RNA RESP QL NAA+PROBE: NEGATIVE
SODIUM SERPL-SCNC: 135 MMOL/L (ref 135–147)
WBC # BLD AUTO: 17.66 THOUSAND/UL (ref 4.31–10.16)

## 2024-05-15 PROCEDURE — 80053 COMPREHEN METABOLIC PANEL: CPT | Performed by: EMERGENCY MEDICINE

## 2024-05-15 PROCEDURE — 87040 BLOOD CULTURE FOR BACTERIA: CPT | Performed by: EMERGENCY MEDICINE

## 2024-05-15 PROCEDURE — 80162 ASSAY OF DIGOXIN TOTAL: CPT | Performed by: NURSE PRACTITIONER

## 2024-05-15 PROCEDURE — 99285 EMERGENCY DEPT VISIT HI MDM: CPT

## 2024-05-15 PROCEDURE — 85610 PROTHROMBIN TIME: CPT | Performed by: EMERGENCY MEDICINE

## 2024-05-15 PROCEDURE — 83605 ASSAY OF LACTIC ACID: CPT | Performed by: EMERGENCY MEDICINE

## 2024-05-15 PROCEDURE — 84145 PROCALCITONIN (PCT): CPT | Performed by: NURSE PRACTITIONER

## 2024-05-15 PROCEDURE — 84484 ASSAY OF TROPONIN QUANT: CPT | Performed by: EMERGENCY MEDICINE

## 2024-05-15 PROCEDURE — 85025 COMPLETE CBC W/AUTO DIFF WBC: CPT | Performed by: EMERGENCY MEDICINE

## 2024-05-15 PROCEDURE — 96368 THER/DIAG CONCURRENT INF: CPT

## 2024-05-15 PROCEDURE — 96365 THER/PROPH/DIAG IV INF INIT: CPT

## 2024-05-15 PROCEDURE — 83880 ASSAY OF NATRIURETIC PEPTIDE: CPT | Performed by: EMERGENCY MEDICINE

## 2024-05-15 PROCEDURE — 85730 THROMBOPLASTIN TIME PARTIAL: CPT | Performed by: EMERGENCY MEDICINE

## 2024-05-15 PROCEDURE — 99291 CRITICAL CARE FIRST HOUR: CPT | Performed by: EMERGENCY MEDICINE

## 2024-05-15 PROCEDURE — 93005 ELECTROCARDIOGRAM TRACING: CPT

## 2024-05-15 PROCEDURE — 36415 COLL VENOUS BLD VENIPUNCTURE: CPT | Performed by: EMERGENCY MEDICINE

## 2024-05-15 PROCEDURE — 0241U HB NFCT DS VIR RESP RNA 4 TRGT: CPT | Performed by: EMERGENCY MEDICINE

## 2024-05-15 PROCEDURE — 71275 CT ANGIOGRAPHY CHEST: CPT

## 2024-05-15 RX ORDER — CEFEPIME HYDROCHLORIDE 2 G/50ML
2000 INJECTION, SOLUTION INTRAVENOUS ONCE
Status: COMPLETED | OUTPATIENT
Start: 2024-05-15 | End: 2024-05-15

## 2024-05-15 RX ADMIN — IOHEXOL 100 ML: 350 INJECTION, SOLUTION INTRAVENOUS at 23:05

## 2024-05-15 RX ADMIN — CEFEPIME HYDROCHLORIDE 2000 MG: 2 INJECTION, SOLUTION INTRAVENOUS at 23:25

## 2024-05-15 RX ADMIN — SODIUM CHLORIDE, SODIUM LACTATE, POTASSIUM CHLORIDE, AND CALCIUM CHLORIDE 500 ML: .6; .31; .03; .02 INJECTION, SOLUTION INTRAVENOUS at 22:51

## 2024-05-16 ENCOUNTER — APPOINTMENT (INPATIENT)
Dept: NON INVASIVE DIAGNOSTICS | Facility: HOSPITAL | Age: 74
DRG: 871 | End: 2024-05-16
Payer: MEDICARE

## 2024-05-16 PROBLEM — A41.9 SEVERE SEPSIS (HCC): Status: ACTIVE | Noted: 2024-05-16

## 2024-05-16 PROBLEM — I50.32 CHRONIC DIASTOLIC CONGESTIVE HEART FAILURE (HCC): Status: ACTIVE | Noted: 2023-10-31

## 2024-05-16 PROBLEM — E80.6 HYPERBILIRUBINEMIA: Status: ACTIVE | Noted: 2024-05-16

## 2024-05-16 PROBLEM — R65.20 SEVERE SEPSIS (HCC): Status: ACTIVE | Noted: 2024-05-16

## 2024-05-16 PROBLEM — Z96.652 HISTORY OF TOTAL LEFT KNEE REPLACEMENT (TKR): Status: ACTIVE | Noted: 2024-05-16

## 2024-05-16 PROBLEM — I48.0 PAF (PAROXYSMAL ATRIAL FIBRILLATION) (HCC): Status: ACTIVE | Noted: 2023-10-29

## 2024-05-16 PROBLEM — D64.9 ANEMIA: Status: ACTIVE | Noted: 2024-05-16

## 2024-05-16 PROBLEM — J18.9 LEFT LOWER LOBE PNEUMONIA: Status: ACTIVE | Noted: 2024-05-16

## 2024-05-16 LAB
2HR DELTA HS TROPONIN: -3 NG/L
B PARAP IS1001 DNA NPH QL NAA+NON-PROBE: NOT DETECTED
B PERT.PT PRMT NPH QL NAA+NON-PROBE: NOT DETECTED
BACTERIA UR QL AUTO: NORMAL /HPF
BILIRUB UR QL STRIP: ABNORMAL
C PNEUM DNA NPH QL NAA+NON-PROBE: NOT DETECTED
CARDIAC TROPONIN I PNL SERPL HS: 12 NG/L
CLARITY UR: CLEAR
COLOR UR: YELLOW
DIGOXIN SERPL-MCNC: 0.5 NG/ML (ref 0.8–2)
FINE GRAN CASTS URNS QL MICRO: NORMAL /LPF
FLUAV RNA NPH QL NAA+NON-PROBE: NOT DETECTED
FLUBV RNA NPH QL NAA+NON-PROBE: NOT DETECTED
GLUCOSE UR STRIP-MCNC: NEGATIVE MG/DL
HADV DNA NPH QL NAA+NON-PROBE: NOT DETECTED
HCOV 229E RNA NPH QL NAA+NON-PROBE: NOT DETECTED
HCOV HKU1 RNA NPH QL NAA+NON-PROBE: NOT DETECTED
HCOV NL63 RNA NPH QL NAA+NON-PROBE: NOT DETECTED
HCOV OC43 RNA NPH QL NAA+NON-PROBE: NOT DETECTED
HGB UR QL STRIP.AUTO: ABNORMAL
HMPV RNA NPH QL NAA+NON-PROBE: NOT DETECTED
HPIV1 RNA NPH QL NAA+NON-PROBE: NOT DETECTED
HPIV2 RNA NPH QL NAA+NON-PROBE: NOT DETECTED
HPIV3 RNA NPH QL NAA+NON-PROBE: NOT DETECTED
HPIV4 RNA NPH QL NAA+NON-PROBE: NOT DETECTED
KETONES UR STRIP-MCNC: NEGATIVE MG/DL
L PNEUMO1 AG UR QL IA.RAPID: NEGATIVE
LEUKOCYTE ESTERASE UR QL STRIP: NEGATIVE
M PNEUMO DNA NPH QL NAA+NON-PROBE: NOT DETECTED
NITRITE UR QL STRIP: NEGATIVE
NON-SQ EPI CELLS URNS QL MICRO: NORMAL /HPF
PH UR STRIP.AUTO: 5.5 [PH]
PROCALCITONIN SERPL-MCNC: 0.14 NG/ML
PROT UR STRIP-MCNC: ABNORMAL MG/DL
QRS AXIS: 57 DEGREES
QRSD INTERVAL: 94 MS
QT INTERVAL: 328 MS
QTC INTERVAL: 427 MS
RBC #/AREA URNS AUTO: NORMAL /HPF
RSV RNA NPH QL NAA+NON-PROBE: NOT DETECTED
RV+EV RNA NPH QL NAA+NON-PROBE: NOT DETECTED
S PNEUM AG UR QL: NEGATIVE
SARS-COV-2 RNA NPH QL NAA+NON-PROBE: NOT DETECTED
SP GR UR STRIP.AUTO: 1.01 (ref 1–1.03)
T WAVE AXIS: 34 DEGREES
UROBILINOGEN UR QL STRIP.AUTO: 0.2 E.U./DL
VENTRICULAR RATE: 102 BPM
WBC #/AREA URNS AUTO: NORMAL /HPF

## 2024-05-16 PROCEDURE — 87070 CULTURE OTHR SPECIMN AEROBIC: CPT | Performed by: NURSE PRACTITIONER

## 2024-05-16 PROCEDURE — 84484 ASSAY OF TROPONIN QUANT: CPT | Performed by: EMERGENCY MEDICINE

## 2024-05-16 PROCEDURE — 87185 SC STD ENZYME DETCJ PER NZM: CPT | Performed by: NURSE PRACTITIONER

## 2024-05-16 PROCEDURE — 93971 EXTREMITY STUDY: CPT

## 2024-05-16 PROCEDURE — 94760 N-INVAS EAR/PLS OXIMETRY 1: CPT

## 2024-05-16 PROCEDURE — 87184 SC STD DISK METHOD PER PLATE: CPT | Performed by: NURSE PRACTITIONER

## 2024-05-16 PROCEDURE — 97166 OT EVAL MOD COMPLEX 45 MIN: CPT

## 2024-05-16 PROCEDURE — 0202U NFCT DS 22 TRGT SARS-COV-2: CPT | Performed by: FAMILY MEDICINE

## 2024-05-16 PROCEDURE — 94640 AIRWAY INHALATION TREATMENT: CPT

## 2024-05-16 PROCEDURE — 96375 TX/PRO/DX INJ NEW DRUG ADDON: CPT

## 2024-05-16 PROCEDURE — 99223 1ST HOSP IP/OBS HIGH 75: CPT | Performed by: FAMILY MEDICINE

## 2024-05-16 PROCEDURE — 81001 URINALYSIS AUTO W/SCOPE: CPT | Performed by: EMERGENCY MEDICINE

## 2024-05-16 PROCEDURE — 87205 SMEAR GRAM STAIN: CPT | Performed by: NURSE PRACTITIONER

## 2024-05-16 PROCEDURE — 87077 CULTURE AEROBIC IDENTIFY: CPT | Performed by: NURSE PRACTITIONER

## 2024-05-16 PROCEDURE — 92610 EVALUATE SWALLOWING FUNCTION: CPT

## 2024-05-16 PROCEDURE — 36415 COLL VENOUS BLD VENIPUNCTURE: CPT | Performed by: EMERGENCY MEDICINE

## 2024-05-16 PROCEDURE — 97162 PT EVAL MOD COMPLEX 30 MIN: CPT

## 2024-05-16 PROCEDURE — 93971 EXTREMITY STUDY: CPT | Performed by: SURGERY

## 2024-05-16 PROCEDURE — 87449 NOS EACH ORGANISM AG IA: CPT | Performed by: NURSE PRACTITIONER

## 2024-05-16 RX ORDER — BISOPROLOL FUMARATE 10 MG/1
5 TABLET, FILM COATED ORAL 2 TIMES DAILY
Status: ON HOLD | COMMUNITY
End: 2024-05-16 | Stop reason: CLARIF

## 2024-05-16 RX ORDER — DOXYCYCLINE HYCLATE 100 MG/1
100 CAPSULE ORAL 2 TIMES DAILY
Status: DISCONTINUED | OUTPATIENT
Start: 2024-05-16 | End: 2024-05-17

## 2024-05-16 RX ORDER — BUDESONIDE 0.5 MG/2ML
0.5 INHALANT ORAL
Status: DISCONTINUED | OUTPATIENT
Start: 2024-05-16 | End: 2024-05-18 | Stop reason: HOSPADM

## 2024-05-16 RX ORDER — CEFTRIAXONE 2 G/50ML
2000 INJECTION, SOLUTION INTRAVENOUS EVERY 24 HOURS
Status: DISCONTINUED | OUTPATIENT
Start: 2024-05-16 | End: 2024-05-18 | Stop reason: HOSPADM

## 2024-05-16 RX ORDER — AZITHROMYCIN 250 MG/1
500 TABLET, FILM COATED ORAL EVERY 24 HOURS
Status: DISCONTINUED | OUTPATIENT
Start: 2024-05-16 | End: 2024-05-16

## 2024-05-16 RX ORDER — LANOLIN ALCOHOL/MO/W.PET/CERES
400 CREAM (GRAM) TOPICAL 3 TIMES DAILY
Status: DISCONTINUED | OUTPATIENT
Start: 2024-05-16 | End: 2024-05-18 | Stop reason: HOSPADM

## 2024-05-16 RX ORDER — ASPIRIN 81 MG/1
81 TABLET, CHEWABLE ORAL DAILY
Status: DISCONTINUED | OUTPATIENT
Start: 2024-05-16 | End: 2024-05-18 | Stop reason: HOSPADM

## 2024-05-16 RX ORDER — ATORVASTATIN CALCIUM 20 MG/1
20 TABLET, FILM COATED ORAL
Status: DISCONTINUED | OUTPATIENT
Start: 2024-05-16 | End: 2024-05-18 | Stop reason: HOSPADM

## 2024-05-16 RX ORDER — FUROSEMIDE 40 MG/1
40 TABLET ORAL DAILY
COMMUNITY

## 2024-05-16 RX ORDER — BISOPROLOL FUMARATE 5 MG/1
10 TABLET, FILM COATED ORAL DAILY
Status: DISCONTINUED | OUTPATIENT
Start: 2024-05-16 | End: 2024-05-16

## 2024-05-16 RX ORDER — BISOPROLOL FUMARATE 5 MG/1
2.5 TABLET, FILM COATED ORAL DAILY
Status: DISCONTINUED | OUTPATIENT
Start: 2024-05-16 | End: 2024-05-18 | Stop reason: HOSPADM

## 2024-05-16 RX ORDER — IPRATROPIUM BROMIDE AND ALBUTEROL SULFATE 2.5; .5 MG/3ML; MG/3ML
3 SOLUTION RESPIRATORY (INHALATION) ONCE
Status: COMPLETED | OUTPATIENT
Start: 2024-05-16 | End: 2024-05-16

## 2024-05-16 RX ORDER — IPRATROPIUM BROMIDE AND ALBUTEROL SULFATE 2.5; .5 MG/3ML; MG/3ML
3 SOLUTION RESPIRATORY (INHALATION) EVERY 6 HOURS PRN
Status: DISCONTINUED | OUTPATIENT
Start: 2024-05-16 | End: 2024-05-18 | Stop reason: HOSPADM

## 2024-05-16 RX ORDER — BISOPROLOL FUMARATE 5 MG/1
2.5 TABLET, FILM COATED ORAL DAILY
COMMUNITY

## 2024-05-16 RX ORDER — DIGOXIN 125 MCG
125 TABLET ORAL DAILY
Status: DISCONTINUED | OUTPATIENT
Start: 2024-05-16 | End: 2024-05-18 | Stop reason: HOSPADM

## 2024-05-16 RX ADMIN — BUDESONIDE INHALATION 0.5 MG: 0.5 SUSPENSION RESPIRATORY (INHALATION) at 07:41

## 2024-05-16 RX ADMIN — ASPIRIN 81 MG 81 MG: 81 TABLET ORAL at 08:25

## 2024-05-16 RX ADMIN — DIGOXIN 125 MCG: 125 TABLET ORAL at 08:25

## 2024-05-16 RX ADMIN — CEFTRIAXONE 2000 MG: 2 INJECTION, SOLUTION INTRAVENOUS at 08:25

## 2024-05-16 RX ADMIN — DOXYCYCLINE 100 MG: 100 CAPSULE ORAL at 08:25

## 2024-05-16 RX ADMIN — DOXYCYCLINE 100 MG: 100 CAPSULE ORAL at 17:17

## 2024-05-16 RX ADMIN — BUDESONIDE INHALATION 0.5 MG: 0.5 SUSPENSION RESPIRATORY (INHALATION) at 19:49

## 2024-05-16 RX ADMIN — Medication 400 MG: at 08:25

## 2024-05-16 RX ADMIN — ATORVASTATIN CALCIUM 20 MG: 20 TABLET, FILM COATED ORAL at 17:17

## 2024-05-16 RX ADMIN — BISOPROLOL FUMARATE 2.5 MG: 5 TABLET, FILM COATED ORAL at 11:14

## 2024-05-16 RX ADMIN — VANCOMYCIN HYDROCHLORIDE 1750 MG: 1 INJECTION, POWDER, LYOPHILIZED, FOR SOLUTION INTRAVENOUS at 00:05

## 2024-05-16 RX ADMIN — Medication 400 MG: at 15:39

## 2024-05-16 RX ADMIN — IPRATROPIUM BROMIDE AND ALBUTEROL SULFATE 3 ML: 2.5; .5 SOLUTION RESPIRATORY (INHALATION) at 01:35

## 2024-05-16 RX ADMIN — RIVAROXABAN 20 MG: 20 TABLET, FILM COATED ORAL at 08:25

## 2024-05-16 RX ADMIN — Medication 400 MG: at 20:14

## 2024-05-16 NOTE — CASE MANAGEMENT
Case Management Assessment & Discharge Planning Note    Patient name Manjit Hernandez  Location /-01 MRN [id number]  : 1950 Date 2024       Current Admission Date: 5/15/2024  Current Admission Diagnosis:Left lower lobe pneumonia   Patient Active Problem List    Diagnosis Date Noted Date Diagnosed    Left lower lobe pneumonia 2024     History of total left knee replacement (TKR) 2024     Severe sepsis (HCC) 2024     Anemia 2024     Hyperbilirubinemia 2024     Chronic diastolic congestive heart failure (HCC) 10/31/2023     Lung mass 10/29/2023     COVID 10/29/2023     PAF (paroxysmal atrial fibrillation) (HCC) 10/29/2023     Morbid obesity (HCC) 2021     Chronic bronchitis (HCC) 2021     Mixed simple and mucopurulent chronic bronchitis (HCC) 2019     Hypoxemia requiring supplemental oxygen 2019     History of pulmonary embolism 2019     Malignant neoplasm of lower lobe, right bronchus or lung (HCC) 2019     Essential (primary) hypertension 2016     Chronic obstructive pulmonary disease with acute lower respiratory infection (HCC) 2012       LOS (days): 0  Geometric Mean LOS (GMLOS) (days): 5.1  Days to GMLOS:4.7     OBJECTIVE:    Risk of Unplanned Readmission Score: 16.24         Current admission status: Inpatient  Referral Reason: Other (discharge planning)    Preferred Pharmacy:   [facility]/pharmacy #[id number] [address] [address]  Phone: [phone] Fax: [phone]    Primary Care Provider: Vicki Cota DO    Primary Insurance: MEDICARE  Secondary Insurance: [employer] [place]    ASSESSMENT:  Active Health Care Proxies    There are no active Health Care Proxies on file.       Advance Directives  Does patient have a Health Care POA?: Yes  Does patient have Advance Directives?: Yes  Advance Directives: Living will  Primary Contact: Erum (Spouse)          Readmission Root Cause  30 Day Readmission: No    Patient Information  Admitted from:: Home  Mental Status: Alert  During Assessment patient was accompanied by: Spouse, Daughter  Assessment information provided by:: Patient, Spouse  Primary Caregiver: Self  Support Systems: Spouse/significant other, Children, Daughter, Son, Family members (2 daughters, 1 son, DIL, 4 grandchildren)  County of Residence: [facility]  What city do you live in?: [place]  Home entry access options. Select all that apply.: Stairs (wide steps)  Number of steps to enter home.: 2  Do the steps have railings?: Yes  Type of Current Residence: [facility]  Living Arrangements: Lives w/ Spouse/significant other  Is patient a ?: No    Activities of Daily Living Prior to Admission  Functional Status: Independent  Completes ADLs independently?: Yes  Ambulates independently?: Yes  Does patient use assisted devices?: Yes  Assisted Devices (DME) used: Nebulizer, Straight Cane, Shower Chair, Other (Comment) (shower chair also toliet raiser; patient also has inhalers)  DME Company Name (respiratory supplies): patient cannot remember  Does patient currently own DME?: Yes  What DME does the patient currently own?: Shower Chair, Walker, Straight Cane, Nebulizer  Does patient have a history of Outpatient Therapy (PT/OT)?: Yes ([facility])  Does the patient have a history of Short-Term Rehab?: No  Does patient have a history of HHC?: Yes ([facility]  Does patient currently have HHC?: No         Patient Information Continued  Income Source: SSI/SSD (pension)  Does patient have prescription coverage?: Yes  Does patient receive dialysis treatments?: No  Does patient have a history of substance abuse?: No  Does patient have a history of Mental Health Diagnosis?: No         Means of Transportation  Means of Transport to Appts:: Drives Self      Social Determinants of Health (SDOH)      Flowsheet Row Most Recent Value   Housing Stability    In the  last 12 months, was there a time when you were not able to pay the mortgage or rent on time? N   In the past 12 months, how many times have you moved where you were living? 1   At any time in the past 12 months, were you homeless or living in a shelter (including now)? N   Transportation Needs    In the past 12 months, has lack of transportation kept you from medical appointments or from getting medications? no   In the past 12 months, has lack of transportation kept you from meetings, work, or from getting things needed for daily living? No   Food Insecurity    Within the past 12 months, you worried that your food would run out before you got the money to buy more. Never true   Within the past 12 months, the food you bought just didn't last and you didn't have money to get more. Never true   Utilities    In the past 12 months has the electric, gas, oil, or water company threatened to shut off services in your home? No            DISCHARGE DETAILS:    Discharge planning discussed with:: Patient, Spouse, Daugher  Freedom of Choice: Yes  Comments - Freedom of Choice: Preference is resumption of OP PT with [facility] - post knee surgery 3 weeks, and anticipated 2-3 more weeks when dsicharged from the hospital  CM contacted family/caregiver?: Yes  Were Treatment Team discharge recommendations reviewed with patient/caregiver?: Yes  Did patient/caregiver verbalize understanding of patient care needs?: Yes  Were patient/caregiver advised of the risks associated with not following Treatment Team discharge recommendations?: Yes    Contacts  Patient Contacts: Erum Hernandez, spouse, daughter Dina  Relationship to Patient:: Family  Contact Method: In Person  Reason/Outcome: Continuity of Care, Discharge Planning    Requested Home Health Care         Is the patient interested in HHC at discharge?: No    DME Referral Provided  Referral made for DME?: No    Other Referral/Resources/Interventions Provided:  Interventions: Outpatient  PT  Referral Comments: Resumption of OP PT with [facility]    Would you like to participate in our [facility] service program?  : No - Declined    Treatment Team Recommendation: Home  Discharge Destination Plan:: Home  Transport at Discharge : Family          CM met with patient and family at the bedside,baseline information  was obtained. CM discussed the role of CM in helping the patient develop a discharge plan and assist the patient in carry out their plan.  Patient lives with their wife in a ranch style home with two wide steps to enter. Patient was IPTA and uses a cane for ambulation (recently transitioning from the walker post 3 weeks knee surgery). Patient completes all ADL on their own. Patient was active with [facility] for OP PT and this is desired discharge plan. The family declined any other CM needs.     CM to follow for additional care management and discharge needs.

## 2024-05-16 NOTE — ASSESSMENT & PLAN NOTE
Total bilirubin 3.41  Has been slowly climbing over the years  Outpatient workup for further evaluation

## 2024-05-16 NOTE — ASSESSMENT & PLAN NOTE
Hypothermia, tachycardia, leukocytosis with transient hypotension fluid responsive  Did not receive full 30 mL/kg crystalloid fluid resuscitation secondary to history CHF and resolution of hypotension after smaller volume.  Lactic 1.9  Leukocytosis 17.66  Blood cultures pending  Trend fever curve, leukocytosis

## 2024-05-16 NOTE — ASSESSMENT & PLAN NOTE
PTA bisoprolol and digoxin  Digoxin level pending  Chronically anticoagulated with Eliquis  Admission EKG A-fib rate 102

## 2024-05-16 NOTE — ASSESSMENT & PLAN NOTE
Wt Readings from Last 3 Encounters:   05/15/24 (!) 138 kg (303 lb 5.7 oz)   10/30/23 (!) 148 kg (327 lb)   09/21/21 (!) 143 kg (316 lb)   History CHF with preserved ejection fraction  Euvolemic on exam  No diuretics in the outpatient setting  Monitor volume status

## 2024-05-16 NOTE — PLAN OF CARE
Problem: PHYSICAL THERAPY ADULT  Goal: Performs mobility at highest level of function for planned discharge setting.  See evaluation for individualized goals.  Description: Treatment/Interventions: Functional transfer training, LE strengthening/ROM, Elevations, Therapeutic exercise, Endurance training, Patient/family training, Equipment eval/education, Gait training, Compensatory technique education, Spoke to nursing, OT          See flowsheet documentation for full assessment, interventions and recommendations.  Note: Prognosis: Excellent  Problem List: Decreased strength, Decreased range of motion, Decreased endurance, Impaired balance, Decreased mobility, Obesity  Assessment: Pt is a 73 y.o. male seen for PT evaluation s/p admission to [facility] on 5/15/2024 with Left lower lobe pneumonia.  Order placed for PT services.  Upon evaluation: Pt is presenting with impaired functional mobility due to decreased strength, decreased ROM, decreased endurance, impaired balance, gait deviations, and fall risk requiring  SPV assistance for transfers and ambulation with SPC . Pt's clinical presentation is currently evolving given the functional mobility deficits above, especially decreased activity tolerance and SOB upon exertion, coupled with fall risks as indicated by AM-PAC 6-Clicks: 20/24 as well as obesity and combined with medical complications of abnormal renal lab values, abnormal blood sugars, and need for input for mobility technique/safety.  Pt's PMHx and comorbidities that may affect physical performance and progress include: CHF, COPD, HTN, obesity, and cancer history and/or treatment. Personal factors affecting pt at time of IE include: step(s) to enter environment. Pt will benefit from continued skilled PT services to address deficits as defined above and to maximize level of functional mobility to facilitate return toward PLOF and improved QOL. From PT/mobility standpoint, recommendation  at time of d/c would be Level III (Minimum Resource Intensity) and with cane pending progress in order to reduce fall risk and maximize pt's functional independence and consistency with mobility in order to facilitate return to PLOF.  Recommend trial with cane next 1-2 sessions and ther ex next 1-2 sessions.  Barriers to Discharge: None     Rehab Resource Intensity Level, PT: III (Minimum Resource Intensity)    See flowsheet documentation for full assessment.

## 2024-05-16 NOTE — ASSESSMENT & PLAN NOTE
COPD with mild exacerbation  Continue nebulized bronchodilators and home Pulmicort  No indication for IV steroids at this time  Continue to monitor  Empiric ceftriaxone/doxycycline for pulmonary infection

## 2024-05-16 NOTE — SPEECH THERAPY NOTE
Speech Language/Pathology  Speech-Language Pathology Bedside Swallow Evaluation      Patient Name: Manjit Hernandez    Today's Date: 5/16/2024     Summary   Consult received for bedside swallow assessment. Pt admitted w/ severe sepsis d/t multifocal PNA. PMHx includes COPD, AFIB, CHF. Pt denies dysphagia, reports consuming a regular texture diet at home. Reports recent thick green secretions which have improved over the last day. No longer frequently coughing.   Observed w/ regular texture lunch and thin liquids. Rapid rate of intake. Adequate mastication and clearance, no overt s/s aspiration w/ intake.    Risk/s for Aspiration: Low suspicion of aspiration PNA d/t procal negative, no remote hx of PNA and pt denying dysphagia.     Recommended Diet: regular diet and thin liquids   Recommended Form of Meds: whole with liquid   Aspiration precautions and swallowing strategies: upright posture  Other Recommendations: Continue frequent oral care        Current Medical Status  Manjit Hernandez is a 73 y.o. male with a PMH of right lower lobe lobectomy for lung cancer, PAF, COVID, PE, CHF with preserved ejection fraction, COPD, hearing deficit, hypertension, recent left TKR on 4/25 who presents with dyspnea.  Patient states good postoperative course until 1 week ago when he developed URI symptoms.  Tonight very dyspneic presented to the ED where CT chest revealed pneumonia.  Patient is chronically anticoagulated with INR 3.  Presented to the medical service for further evaluation and treatment.    Current Precautions:  Fall     Allergies:  No known food allergies    Past medical history:  Please see H&P for details    Special Studies:  CTA Chest:    1. No pulmonary embolism.  2. Multifocal infiltrates in the right upper lobe with left lower lobe infiltrate and progression of mediastinal and hilar adenopathy. Adenopathy could be reactive. In view of patient's history of right lung malignancy possibility of neoplastic    adenopathy cannot be fully excluded.  Short interval follow-up in 2- 3 months interval with CT with contrast is recommended to assess resolution of infiltrates and adenopathy and exclude neoplastic etiology of adenopathy.  3. Stable 1.5 cm left adrenal nodule.  This has been described and of similar size on prior outside MRI from [facility] from 2016 suggesting a benign etiology.    Social/Education/Vocational Hx:  Pt lives with family    Swallow Information   Current Risks for Dysphagia & Aspiration:  PNA  Current Symptoms/Concerns: change in respiratory status  Current Diet: regular diet and thin liquids   Baseline Diet: regular diet and thin liquids      Baseline Assessment   Behavior/Cognition: alert  Speech/Language Status: able to participate in conversation  Patient Positioning: upright in recliner  Pain Status/Interventions/Response to Interventions:   No report of or nonverbal indications of pain.       Swallow Mechanism Exam  Facial: symmetrical  Labial: WFL  Lingual: WFL  Velum: symmetrical  Mandible: adequate ROM  Dentition: adequate  Vocal quality:clear/adequate   Volitional Cough: strong/productive   Respiratory Status: on RA        Consistencies Assessed and Performance   Consistencies Administered: thin liquids, soft solids, and hard solids    Oral Stage: WFL  Mastication was adequate with the materials administered today.  Bolus formation and transfer were functional with no significant oral residue noted.  No overt s/s reduced oral control.    Pharyngeal Stage: WFL  Swallow Mechanics:  Swallowing initiation appeared prompt.  Laryngeal rise was palpated and judged to be within functional limits.  No coughing, throat clearing, change in vocal quality or respiratory status noted today.     Esophageal Concerns: none reported    Summary and Recommendations (see above)    Results Reviewed with: patient and RN     Treatment Recommended: None at this time     Luz Lares MS  CCC-SLP  5/16/2024

## 2024-05-16 NOTE — ED PROVIDER NOTES
History  Chief Complaint   Patient presents with    Shortness of Breath     Patient presents to the ED with complaints of shortness of breath that began this morning.        History provided by:  Medical records, patient and spouse  Shortness of Breath  Severity:  Moderate  Onset quality:  Gradual  Duration:  1 day  Timing:  Constant  Progression:  Worsening  Chronicity:  New  Context comment:  Patient with a history of COPD, Lasix use, A-fib on Xarelto, recent total knee replacement 4/24/24, increasing dyspnea over the last 24 hours, orthopnea  Relieved by:  Nothing  Worsened by:  Exertion and activity (Lying flat, has had to sleep in his lounger)  Ineffective treatments:  Sitting up, position changes, diuretics, inhaler and rest  Associated symptoms: no abdominal pain, no chest pain, no claudication, no cough, no diaphoresis, no ear pain, no fever, no headaches, no hemoptysis, no neck pain, no PND, no rash, no sore throat, no sputum production, no syncope, no swollen glands, no vomiting and no wheezing    Risk factors: hx of PE/DVT, prolonged immobilization and recent surgery        Prior to Admission Medications   Prescriptions Last Dose Informant Patient Reported? Taking?   DM-APAP-CPM (CORICIDIN HBP PO)   Yes No   Sig: Take 1 tablet by mouth 3 (three) times a day   Magnesium 400 MG TABS   Yes No   Sig: Take 1 tablet by mouth 3 (three) times a day   Propylene Glycol (SYSTANE BALANCE) 0.6 % SOLN   Yes No   Sig: Apply to eye   Spacer/Aero-Holding Chambers (Carriehamchang Crane) MISC   Yes No   Sig: USE WITH INHALER   albuterol (2.5 mg/3 mL) 0.083 % nebulizer solution   Yes No   Sig: Take 2.5 mg by nebulization every 6 (six) hours as needed for wheezing or shortness of breath   albuterol (Ventolin HFA) 90 mcg/act inhaler   No No   Sig: Inhale 2 puffs every 6 (six) hours as needed for wheezing   apixaban (Eliquis) 5 mg   No No   Sig: Take 1 tablet (5 mg total) by mouth 2 (two) times a day   aspirin 81 mg chewable  tablet   Yes No   Sig: Chew 81 mg daily   atorvastatin (LIPITOR) 20 mg tablet   Yes No   Sig: Take 20 mg by mouth daily   budesonide (Pulmicort) 0.5 mg/2 mL nebulizer solution   No No   Sig: Take 2 mL (0.5 mg total) by nebulization 2 (two) times a day Rinse mouth after use.   digoxin (LANOXIN) 0.125 mg tablet   No No   Sig: Take 1 tablet (125 mcg total) by mouth daily Do not start before 2023.   furosemide (LASIX) 40 mg tablet   Yes No   Sig: Take 40 mg by mouth daily   metoprolol tartrate (LOPRESSOR) 100 mg tablet   No No   Sig: Take 1 tablet (100 mg total) by mouth every 12 (twelve) hours      Facility-Administered Medications: None       Past Medical History:   Diagnosis Date    Arthritis     (L) hip    Cataract     PHIL    COPD (chronic obstructive pulmonary disease) (HCC)     Hernia of abdominal wall     Hip joint pain     (L)    [place] (hard of hearing)     phil hearing aides    Hypertension     Macular degeneration     phil    Psoriasis     elbows and ankles- small  red open areas left ankle    Pulmonary emphysema (HCC)     Wears glasses        Past Surgical History:   Procedure Laterality Date    CARPAL TUNNEL RELEASE Bilateral     CERVICAL FUSION      C 3/4    COLONOSCOPY      FL ARTHRP ACETBLR/PROX FEM PROSTC AGRFT/ALGRFT Left 2016    Procedure: ARTHROPLASTY HIP TOTAL;  Surgeon: Jewel Sales MD;  Location: [facility];  Service: Orthopedics       History reviewed. No pertinent family history.  I have reviewed and agree with the history as documented.    E-Cigarette/Vaping    E-Cigarette Use Never User      E-Cigarette/Vaping Substances     Social History     Tobacco Use    Smoking status: Former     Current packs/day: 0.00     Average packs/day: 2.0 packs/day for 40.0 years (80.0 ttl pk-yrs)     Types: Cigarettes     Start date: 1973     Quit date: 2013     Years since quittin.3    Smokeless tobacco: Never   Vaping Use    Vaping status: Never Used   Substance Use Topics    Alcohol  use: Not Currently     Comment: 1x year    Drug use: No       Review of Systems   Constitutional:  Negative for appetite change, chills, diaphoresis, fatigue and fever.   HENT:  Negative for ear pain, rhinorrhea, sore throat and trouble swallowing.    Eyes:  Negative for pain, discharge and visual disturbance.   Respiratory:  Positive for shortness of breath. Negative for cough, hemoptysis, sputum production, chest tightness and wheezing.    Cardiovascular:  Negative for chest pain, palpitations, claudication, syncope and PND.   Gastrointestinal:  Negative for abdominal pain, nausea and vomiting.   Endocrine: Negative for polydipsia, polyphagia and polyuria.   Genitourinary:  Negative for difficulty urinating, dysuria, hematuria and testicular pain.   Musculoskeletal:  Negative for arthralgias, back pain and neck pain.   Skin:  Negative for color change and rash.   Allergic/Immunologic: Negative for immunocompromised state.   Neurological:  Negative for dizziness, seizures, syncope, weakness and headaches.   Hematological:  Negative for adenopathy.   Psychiatric/Behavioral:  Negative for confusion and dysphoric mood.    All other systems reviewed and are negative.      Physical Exam  Physical Exam  Vitals and nursing note reviewed.   Constitutional:       General: He is not in acute distress.     Appearance: Normal appearance. He is not ill-appearing, toxic-appearing or diaphoretic.   HENT:      Head: Normocephalic and atraumatic.      Nose: Nose normal. No congestion or rhinorrhea.      Mouth/Throat:      Mouth: Mucous membranes are moist.      Pharynx: Oropharynx is clear. No oropharyngeal exudate or posterior oropharyngeal erythema.   Eyes:      General:         Right eye: No discharge.         Left eye: No discharge.   Cardiovascular:      Rate and Rhythm: Normal rate and regular rhythm.      Pulses: Normal pulses.      Heart sounds: Murmur heard.      No gallop.      Comments: 2/6 systolic murmur  Pulmonary:       Effort: Pulmonary effort is normal. No respiratory distress.      Breath sounds: No stridor. Examination of the right-middle field reveals decreased breath sounds. Examination of the right-lower field reveals decreased breath sounds. Examination of the left-lower field reveals rales. Decreased breath sounds and rales present. No wheezing or rhonchi.   Chest:      Chest wall: No tenderness.   Abdominal:      General: Bowel sounds are normal. There is no distension.      Palpations: Abdomen is soft. There is no mass.      Tenderness: There is no abdominal tenderness. There is no right CVA tenderness, left CVA tenderness, guarding or rebound.      Hernia: No hernia is present.   Musculoskeletal:         General: Normal range of motion.      Cervical back: Normal range of motion and neck supple.      Right lower leg: No tenderness. Edema present.      Left lower leg: No tenderness. Edema present.      Comments: Healing left TKR incision without evidence of cellulitis.  +2 pitting edema right lower extremity, +3 pitting edema left lower extremity.   Skin:     General: Skin is warm and dry.      Capillary Refill: Capillary refill takes less than 2 seconds.      Findings: Erythema present.      Comments: Mild jaundice appearance to his face.  Mild erythema to the entire left lower extremity.   Neurological:      General: No focal deficit present.      Mental Status: He is alert and oriented to person, place, and time.      Cranial Nerves: No cranial nerve deficit.      Sensory: No sensory deficit.      Motor: No weakness.      Coordination: Coordination normal.      Gait: Gait normal.      Deep Tendon Reflexes: Reflexes normal.   Psychiatric:         Mood and Affect: Mood normal. Mood is not anxious.         Behavior: Behavior normal.         Thought Content: Thought content normal.         Judgment: Judgment normal.         Vital Signs  ED Triage Vitals [05/15/24 2214]   Temperature Pulse Respirations Blood Pressure  SpO2   (!) 96.1 °F (35.6 °C) 98 18 92/53 92 %      Temp Source Heart Rate Source Patient Position - Orthostatic VS BP Location FiO2 (%)   Temporal Monitor Lying Left arm --      Pain Score       No Pain           Vitals:    05/15/24 2300 05/15/24 2305 05/15/24 2330 05/15/24 2345   BP: (!) 82/45 103/84 99/58 104/59   Pulse:  91 81 85   Patient Position - Orthostatic VS: Sitting Sitting Sitting          Visual Acuity      ED Medications  Medications   vancomycin (VANCOCIN) 1750 mg in sodium chloride 0.9% 500 mL IVPB (has no administration in time range)   lactated ringers bolus 500 mL (0 mL Intravenous Stopped 5/16/24 0004)   iohexol (OMNIPAQUE) 350 MG/ML injection (MULTI-DOSE) 100 mL (100 mL Intravenous Given 5/15/24 2305)   cefepime (MAXIPIME) IVPB (premix in dextrose) 2,000 mg 50 mL (0 mg Intravenous Stopped 5/15/24 2355)       Diagnostic Studies  Results Reviewed       Procedure Component Value Units Date/Time    HS Troponin I 4hr [[id number]]     Lab Status: No result Specimen: Blood     Protime-INR [[id number]]  (Abnormal) Collected: 05/15/24 2226    Lab Status: Final result Specimen: Blood from Arm, Right Updated: 05/15/24 2316     Protime 31.4 seconds      INR 3.00    APTT [[id number]]  (Abnormal) Collected: 05/15/24 2226    Lab Status: Final result Specimen: Blood from Arm, Right Updated: 05/15/24 2316     PTT 50 seconds     FLU/RSV/COVID - if FLU/RSV clinically relevant [[id number]]  (Normal) Collected: 05/15/24 2226    Lab Status: Final result Specimen: Nares from Nose Updated: 05/15/24 2314     SARS-CoV-2 Negative     INFLUENZA A PCR Negative     INFLUENZA B PCR Negative     RSV PCR Negative    Narrative:      FOR PEDIATRIC PATIENTS - copy/paste COVID Guidelines URL to browser: [email or web address]    SARS-CoV-2 assay is a Nucleic Acid Amplification assay intended for the  qualitative detection of nucleic acid from SARS-CoV-2 in nasopharyngeal  swabs.  Results are for the presumptive identification of SARS-CoV-2 RNA.    Positive results are indicative of infection with SARS-CoV-2, the virus  causing COVID-19, but do not rule out bacterial infection or co-infection  with other viruses. Laboratories within the [place] and its  territories are required to report all positive results to the appropriate  public health authorities. Negative results do not preclude SARS-CoV-2  infection and should not be used as the sole basis for treatment or other  patient management decisions. Negative results must be combined with  clinical observations, patient history, and epidemiological information.  This test has not been FDA cleared or approved.    This test has been authorized by FDA under an Emergency Use Authorization  (EUA). This test is only authorized for the duration of time the  declaration that circumstances exist justifying the authorization of the  emergency use of an in vitro diagnostic tests for detection of SARS-CoV-2  virus and/or diagnosis of COVID-19 infection under section 564(b)(1) of  the Act, 21 U.S.C. 360bbb-3(b)(1), unless the authorization is terminated  or revoked sooner. The test has been validated but independent review by FDA  and CLIA is pending.    Test performed using [employer] GeneXpert: This RT-PCR assay targets N2,  a region unique to SARS-CoV-2. A conserved region in the E-gene was chosen  for pan-Sarbecovirus detection which includes SARS-CoV-2.    According to CMS-2020-01-R, this platform meets the definition of high-throughput technology.    B-Type Natriuretic Peptide(BNP) [[id number]]  (Abnormal) Collected: 05/15/24 2226    Lab Status: Final result Specimen: Blood from Arm, Right Updated: 05/15/24 2310      pg/mL     HS Troponin 0hr (reflex protocol) [[id number]]  (Normal) Collected: 05/15/24 2226    Lab Status: Final result Specimen: Blood from Arm, Right Updated: 05/15/24 2303     hs TnI 0hr 15 ng/L     HS Troponin I 2hr  [[id number]]     Lab Status: No result Specimen: Blood     Lactic acid, plasma (w/reflex if result > 2.0) [[id number]]  (Normal) Collected: 05/15/24 2226    Lab Status: Final result Specimen: Blood from Arm, Right Updated: 05/15/24 2259     LACTIC ACID 1.9 mmol/L     Narrative:      Result may be elevated if tourniquet was used during collection.    Comprehensive metabolic panel [[id number]]  (Abnormal) Collected: 05/15/24 2226    Lab Status: Final result Specimen: Blood from Arm, Right Updated: 05/15/24 2259     Sodium 135 mmol/L      Potassium 3.8 mmol/L      Chloride 100 mmol/L      CO2 26 mmol/L      ANION GAP 9 mmol/L      BUN 28 mg/dL      Creatinine 1.20 mg/dL      Glucose 174 mg/dL      Calcium 8.5 mg/dL      AST 16 U/L      ALT 14 U/L      Alkaline Phosphatase 60 U/L      Total Protein 7.1 g/dL      Albumin 3.7 g/dL      Total Bilirubin 3.41 mg/dL      eGFR 59 ml/min/1.73sq m     Narrative:      National Kidney Disease Foundation guidelines for Chronic Kidney Disease (CKD):     Stage 1 with normal or high GFR (GFR > 90 mL/min/1.73 square meters)    Stage 2 Mild CKD (GFR = 60-89 mL/min/1.73 square meters)    Stage 3A Moderate CKD (GFR = 45-59 mL/min/1.73 square meters)    Stage 3B Moderate CKD (GFR = 30-44 mL/min/1.73 square meters)    Stage 4 Severe CKD (GFR = 15-29 mL/min/1.73 square meters)    Stage 5 End Stage CKD (GFR <15 mL/min/1.73 square meters)  Note: GFR calculation is accurate only with a steady state creatinine    CBC and differential [[id number]]  (Abnormal) Collected: 05/15/24 2226    Lab Status: Final result Specimen: Blood from Arm, Right Updated: 05/15/24 2240     WBC 17.66 Thousand/uL      RBC 3.56 Million/uL      Hemoglobin 10.3 g/dL      Hematocrit 33.1 %      MCV 93 fL      MCH 28.9 pg      MCHC 31.1 g/dL      RDW 15.0 %      MPV 8.9 fL      Platelets 405 Thousands/uL      nRBC 0 /100 WBCs      Segmented % 77 %      Immature Grans % 1 %      Lymphocytes % 11 %      Monocytes % 10 %       Eosinophils Relative 1 %      Basophils Relative 0 %      Absolute Neutrophils 13.62 Thousands/µL      Absolute Immature Grans 0.09 Thousand/uL      Absolute Lymphocytes 1.89 Thousands/µL      Absolute Monocytes 1.79 Thousand/µL      Eosinophils Absolute 0.21 Thousand/µL      Basophils Absolute 0.06 Thousands/µL     Blood culture #1 [[id number]] Collected: 05/15/24 2229    Lab Status: In process Specimen: Blood from Arm, Left Updated: 05/15/24 2235    Blood culture #2 [[id number]] Collected: 05/15/24 2226    Lab Status: In process Specimen: Blood from Arm, Right Updated: 05/15/24 2234    UA w Reflex to Microscopic w Reflex to Culture [[id number]]     Lab Status: No result Specimen: Urine                    CTA ED chest PE Study    (Results Pending)   VAS VENOUS DUPLEX -LOWER LIMB UNILATERAL    (Results Pending)              Procedures  CriticalCare Time    Date/Time: 5/15/2024 10:35 PM    Performed by: Marquez De León MD  Authorized by: Marquez De León MD    Critical care provider statement:     Critical care time (minutes):  35    Critical care time was exclusive of:  Separately billable procedures and treating other patients    Critical care was necessary to treat or prevent imminent or life-threatening deterioration of the following conditions:  Circulatory failure and respiratory failure    Critical care was time spent personally by me on the following activities:  Interpretation of cardiac output measurements, ordering and performing treatments and interventions, ordering and review of laboratory studies, ordering and review of radiographic studies, re-evaluation of patient's condition, review of old charts, examination of patient, evaluation of patient's response to treatment, discussions with consultants, development of treatment plan with patient or surrogate and obtaining history from patient or surrogate    I assumed direction of critical care for this patient from another provider in my specialty:  no             ED Course                               SBIRT 22yo+      Flowsheet Row Most Recent Value   Initial Alcohol Screen: US AUDIT-C     1. How often do you have a drink containing alcohol? 0 Filed at: 05/15/2024 2216   2. How many drinks containing alcohol do you have on a typical day you are drinking?  0 Filed at: 05/15/2024 2216   3a. Male UNDER 65: How often do you have five or more drinks on one occasion? 0 Filed at: 05/15/2024 2216   3b. FEMALE Any Age, or MALE 65+: How often do you have 4 or more drinks on one occassion? 0 Filed at: 05/15/2024 2216   Audit-C Score 0 Filed at: 05/15/2024 2216   AUGIE: How many times in the past year have you...    Used an illegal drug or used a prescription medication for non-medical reasons? Never Filed at: 05/15/2024 2216                      Medical Decision Making  2213: Patient appears chronically ill, vital signs reviewed.  Patient with dyspnea with exertion and dyspnea with sitting.  Patient has a history of COPD, likely CHF.  Patient recently had left knee replacement 4/24/2024, increased left leg swelling.  Patient denies chest pain.  Placed on the monitor.  EKG shows atrial fibrillation without acute ischemia.  Patient has evidence of fluid overload however is hypotensive.  Increasing productive cough also concerning for potential pneumonia.  Plan to complete stat CTA chest PE protocol.    2355: CT and labs reviewed.  Awaiting official read, images sent to [facility].  Concerns for left lower lobe pneumonia.  Discussed with hospitalist for admission.  Patient improving with gentle IV fluids.  We will hold off on aggressive IV fluid resuscitation due to history of CHF and potential for fluid overload.    Amount and/or Complexity of Data Reviewed  Labs: ordered.  Radiology: ordered.     Details: CTA chest PE protocol--left lower lobe pneumonia  ECG/medicine tests: ordered and independent interpretation performed.     Details: Afib 103 bpm low voltage QRS, nonspecific  ST abnormalities, no acute ischemia.    Risk  Prescription drug management.  Decision regarding hospitalization.             Disposition  Final diagnoses:   Pneumonia   Hypotension   Shortness of breath     Time reflects when diagnosis was documented in both MDM as applicable and the Disposition within this note       Time User Action Codes Description Comment    5/15/2024 11:40 PM Marquez De León Add [J18.9] Pneumonia     5/15/2024 11:40 PM Marquez De León Add [I95.9] Hypotension     5/15/2024 11:40 PM Marquez De León Add [R06.02] Shortness of breath           ED Disposition       ED Disposition   Admit    Condition   Stable    Date/Time   Wed May 15, 2024 6814    Comment                  Follow-up Information    None         Patient's Medications   Discharge Prescriptions    No medications on file       No discharge procedures on file.    PDMP Review       None            ED Provider  Electronically Signed by             Marquez De León MD  05/16/24 0005

## 2024-05-16 NOTE — ASSESSMENT & PLAN NOTE
Presents with 1 week of URI symptoms  Now with productive cough and dyspnea  CT chest with left lower lobe infiltrates  Patient is 3 weeks postoperative total knee replacement  Empiric ceftriaxone/doxycycline  Sputum cultures ordered  Urine for Legionella/urine for strep pneumonia ordered  Procalcitonin trending

## 2024-05-16 NOTE — PHYSICAL THERAPY NOTE
PHYSICAL THERAPY EVALUATION      NAME:  Manjit Hernandez    DATE: 05/16/24    AGE:   73 y.o.  Mrn:   [id number]  ADMIT DX:  Shortness of breath [R06.02]  Pneumonia [J18.9]  SOB (shortness of breath) [R06.02]  Hypotension [I95.9]    Past Medical History:   Diagnosis Date    Arthritis     (L) hip    Asthma     Atrial fibrillation (HCC)     Cataract     PHIL    CHF (congestive heart failure) (HCC)     COPD (chronic obstructive pulmonary disease) (HCC)     Hernia of abdominal wall     Hip joint pain     (L)    [place] (hard of hearing)     phil hearing aides    Hypertension     Lung cancer (HCC)     Macular degeneration     phil    Psoriasis     elbows and ankles- small  red open areas left ankle    Pulmonary embolism (HCC)     Pulmonary emphysema (HCC)     Wears glasses      Length Of Stay: 0  Performed at least 2 patient identifiers during session: Name and Birthday          PHYSICAL THERAPY EVALUATION:       05/16/24 1002   PT Last Visit   PT Visit Date 05/16/24   Note Type   Note type Evaluation   Pain Assessment   Pain Assessment Tool 0-10   Pain Score No Pain   Restrictions/Precautions   Weight Bearing Precautions Per Order Yes   LLE Weight Bearing Per Order WBAT   Other Precautions Chair Alarm;Bed Alarm;Fall Risk;Hard of hearing;Aspiration   Home Living   Type of Home House   Home Layout One level;Performs ADLs on one level;Able to live on main level with bedroom/bathroom  (1 OMID B rails; does not go to basement)   Bathroom Shower/Tub Walk-in shower   Bathroom Toilet Raised   Bathroom Equipment Grab bars in shower;Shower chair   Bathroom Accessibility Accessible   Home Equipment Walker;Cane;Reacher  (leg )   Additional Comments Has been sleeping in recliner chair since TKA surgery   Prior Function   Level of [place] Independent with ADLs;Independent with functional mobility;Independent with IADLS   Lives With Spouse   Receives Help From Family   IADLs Independent with driving;Independent with meal  "prep;Family/Friend/Other provides medication management   Falls in the last 6 months 0   Comments Mod (I) with SPC   General   Additional Pertinent History Recent L TKA 3 wks ago   Family/Caregiver Present Yes   Cognition   Overall Cognitive Status WFL   Arousal/Participation Cooperative   Orientation Level Oriented to person;Oriented to place;Oriented to situation  (states May 2025)   Following Commands Follows one step commands without difficulty   Subjective   Subjective \"I'm doing better now\"   RLE Assessment   RLE Assessment WFL   LLE Assessment   LLE Assessment X   Strength LLE   L Hip Flexion 3+/5   L Knee Extension 4-/5   L Ankle Dorsiflexion 4/5   Light Touch   RLE Light Touch Grossly intact   LLE Light Touch Grossly intact   Proprioception   RLE Proprioception Grossly intact   LLE Proprioception Grossly Intact   Wound 05/16/24 Surgical Knee Anterior;Left   Date First Assessed/Time First Assessed: 05/16/24 1101   Present on Original Admission: Yes  Primary Wound Type: Surgical  Location: Knee  Wound Location Orientation: Anterior;Left   Wound Description   (INCISION NOTED WITH EDGES APPROXIMATED)   Wound Site Closure   (STERISTRIPS)   Drainage Amount None   Dressing Open to air   Bed Mobility   Supine to Sit 6  Modified independent   Transfers   Sit to Stand 5  Supervision   Stand to Sit 5  Supervision   Stand pivot 5  Supervision   Additional Comments SPC   Ambulation/Elevation   Gait pattern Improper Weight shift   Gait Assistance 5  Supervision   Additional items Verbal cues   Assistive Device Straight cane   Distance 115 ft   Stair Management Assistance Not tested   Additional items   (pt politely declines)   Balance   Static Sitting Normal   Dynamic Sitting Good   Static Standing Fair +   Dynamic Standing Fair   Ambulatory Fair   Endurance Deficit   Endurance Deficit Yes   Endurance Deficit Description SpO2 dec to 87-88% on RA and HR increases to 124 bpm with exertion - pt educated on proper breathing " technique for SpO2 to increase back to >90%   Activity Tolerance   Activity Tolerance Patient limited by fatigue   Medical Staff Made Aware OT Shireen   Nurse Made Aware MARITZA Steele   Assessment   Prognosis Excellent   Problem List Decreased strength;Decreased range of motion;Decreased endurance;Impaired balance;Decreased mobility;Obesity   Barriers to Discharge None   Goals   Patient Goals get back to [facility]   STG Expiration Date 05/30/24   PT Treatment Day 0   Plan   Treatment/Interventions Functional transfer training;LE strengthening/ROM;Elevations;Therapeutic exercise;Endurance training;Patient/family training;Equipment eval/education;Gait training;Compensatory technique education;Spoke to nursing;OT   PT Frequency 1-2x/wk   Discharge Recommendation   Rehab Resource Intensity Level, PT III (Minimum Resource Intensity)   Additional Comments using cane - pt owns   AM-PAC Basic Mobility Inpatient   Turning in Flat Bed Without Bedrails 4   Lying on Back to Sitting on Edge of Flat Bed Without Bedrails 4   Moving Bed to Chair 3   Standing Up From Chair Using Arms 3   Walk in Room 3   Climb 3-5 Stairs With Railing 3   Basic Mobility Inpatient Raw Score 20   Basic Mobility Standardized Score 43.99   [facility] Highest Level Of Mobility   -HLM Goal 6: Walk 10 steps or more   -HLM Achieved 7: Walk 25 feet or more   End of Consult   Patient Position at End of Consult Bedside chair;Bed/Chair alarm activated;All needs within reach   End of Consult Comments family present     (Please find full objective findings from PT assessment regarding body systems outlined above).     Assessment: Pt is a 73 y.o. male seen for PT evaluation s/p admission to [facility] on 5/15/2024 with Left lower lobe pneumonia.  Order placed for PT services.  Upon evaluation: Pt is presenting with impaired functional mobility due to decreased strength, decreased ROM, decreased endurance, impaired balance, gait deviations, and  fall risk requiring  SPV assistance for transfers and ambulation with SPC . Pt's clinical presentation is currently evolving given the functional mobility deficits above, especially decreased activity tolerance and SOB upon exertion, coupled with fall risks as indicated by -PAC 6-Clicks: 20/24 as well as obesity and combined with medical complications of abnormal renal lab values, abnormal blood sugars, and need for input for mobility technique/safety.  Pt's PMHx and comorbidities that may affect physical performance and progress include: CHF, COPD, HTN, obesity, and cancer history and/or treatment. Personal factors affecting pt at time of IE include: step(s) to enter environment. Pt will benefit from continued skilled PT services to address deficits as defined above and to maximize level of functional mobility to facilitate return toward PLOF and improved QOL. From PT/mobility standpoint, recommendation at time of d/c would be Level III (Minimum Resource Intensity) and with cane pending progress in order to reduce fall risk and maximize pt's functional independence and consistency with mobility in order to facilitate return to PLOF.  Recommend trial with cane next 1-2 sessions and ther ex next 1-2 sessions.     The patient's -[facility] Basic Mobility Inpatient Short Form Raw Score is 20. A Raw score of greater than 16 suggests the patient may benefit from discharge to home. Please also refer to the recommendation of the Physical Therapist for safe discharge planning.       Goals: Pt will perform transfers with modified I to increase Indep in home environment and prepare for ambulation. Pt will ambulate with SPC for >/= 250 ft with  modified I  to decrease risk for falls, improve activity tolerance, and improve gait quality and to access home environment. Pt will complete >/= 1 step using SPC with modified I to return to home with OMID. Pt will participate in objective balance assessment to determine baseline fall  risk.        Aj Alarcon, MICHELL,DPT

## 2024-05-16 NOTE — ASSESSMENT & PLAN NOTE
Hx left TKR 4/25/2024 by Dr. Bernabe at [facility]  Well-healed incisions  Lower extremity edematous but I suspect normal postoperative swelling  Venous duplex is ordered by the ED, will complete study  Outpatient follow-up

## 2024-05-16 NOTE — OCCUPATIONAL THERAPY NOTE
Occupational Therapy Evaluation Only     Patient Name: Manjit Hernandez  Today's Date: 5/16/2024  Problem List  Principal Problem:    Left lower lobe pneumonia  Active Problems:    Morbid obesity (HCC)    Essential (primary) hypertension    Chronic obstructive pulmonary disease with acute lower respiratory infection (HCC)    PAF (paroxysmal atrial fibrillation) (HCC)    Chronic diastolic congestive heart failure (HCC)    History of total left knee replacement (TKR)    Severe sepsis (HCC)    Anemia    Hyperbilirubinemia    Past Medical History  Past Medical History:   Diagnosis Date    Arthritis     (L) hip    Asthma     Atrial fibrillation (HCC)     Cataract     PHIL    CHF (congestive heart failure) (HCC)     COPD (chronic obstructive pulmonary disease) (HCC)     Hernia of abdominal wall     Hip joint pain     (L)    [place] (hard of hearing)     phil hearing aides    Hypertension     Lung cancer (HCC)     Macular degeneration     phil    Psoriasis     elbows and ankles- small  red open areas left ankle    Pulmonary embolism (HCC)     Pulmonary emphysema (HCC)     Wears glasses      Past Surgical History  Past Surgical History:   Procedure Laterality Date    CARPAL TUNNEL RELEASE Bilateral     CERVICAL FUSION      C 3/4    COLONOSCOPY      OR ARTHRP ACETBLR/PROX FEM PROSTC AGRFT/ALGRFT Left 2/26/2016    Procedure: ARTHROPLASTY HIP TOTAL;  Surgeon: Jewel Sales MD;  [facility];  Service: Orthopedics        05/16/24 1001   OT Last Visit   OT Visit Date 05/16/24   Note Type   Note type Evaluation   Additional Comments Pt greeted supine in bed, agreeable to OT evaluation   Pain Assessment   Pain Assessment Tool 0-10   Pain Score No Pain   Restrictions/Precautions   Weight Bearing Precautions Per Order Yes   LLE Weight Bearing Per Order WBAT   Other Precautions Chair Alarm;Bed Alarm;Fall Risk;Hard of hearing   Home Living   Type of Home House   Home Layout One level;Performs ADLs on one level;Able to live on main  level with bedroom/bathroom  (1 OMID bilateral rails, does not go into basement)   Bathroom Shower/Tub Walk-in shower   Bathroom Toilet Raised   Bathroom Equipment Grab bars in shower;Shower chair   Bathroom Accessibility Accessible   Home Equipment Walker;Cane;Other (Comment)  (leg )   Additional Comments has been sleeping in recliner chair since TKA surgery 3 weeks ago   Prior Function   Level of [place] Independent with ADLs;Independent with functional mobility;Independent with IADLS   Lives With Spouse   Receives Help From Family   IADLs Independent with driving;Independent with meal prep;Family/Friend/Other provides medication management   Falls in the last 6 months 0   Vocational Retired   Lifestyle   [place] Independent with all ADLs/IADLs except spouse takes care of medications, use of cane at baseline since TKA 3 weeks ago   Reciprocal Relationships Spouse and daughter   Service to Others Retired   General   Family/Caregiver Present Yes   ADL   Where Assessed Edge of bed   Eating Assistance 6  Modified independent   Grooming Assistance 6  Modified Independent   UB Bathing Assistance 6  Modified Independent   LB Bathing Assistance 5  Supervision/Setup   UB Dressing Assistance 6  Modified independent   LB Dressing Assistance 5  Supervision/Setup   Toileting Assistance  5  Supervision/Setup   Bed Mobility   Supine to Sit 6  Modified independent   Transfers   Sit to Stand 5  Supervision   Stand to Sit 5  Supervision   Stand pivot 5  Supervision   Additional Comments use of SPC   Functional Mobility   Functional Mobility 5  Supervision   Additional Comments supervision with use of SPC short in room distances   Additional items SPC   Balance   Static Sitting Normal   Dynamic Sitting Good   Static Standing Fair +   Dynamic Standing Fair   Ambulatory Fair   Activity Tolerance   Activity Tolerance Patient limited by fatigue   Medical Staff Made Aware PT Mauricio Rocha seen for co-evaluation with skilled  Physical Therapy due to clinically unstable presentation, medical complexity, fall risk, functional balance, limited activity tolerance which is a decline from PLOF and may impact overall safety.   Nurse Made Aware MARITZA STANTON Assessment   RUE Assessment WFL   LUE Assessment   LUE Assessment WFL   Hand Function   Gross Motor Coordination Functional   Fine Motor Coordination Functional   Cognition   Overall Cognitive Status WFL   Arousal/Participation Alert;Cooperative   Attention Within functional limits   Orientation Level Oriented X4   Memory Within functional limits   Following Commands Follows one step commands without difficulty   Comments Cooperative and pleasant   Assessment   Prognosis Good   Assessment Pt is a 73 y.o. male seen for OT evaluation s/p adm to [facility] on 5/15/2024 w/ Left lower lobe pneumonia . Comorbidities affecting pt’s functional performance include a significant PMH of HTN, COPD, PAF, severe sepsis, anemia, hyperbilirubinemia, CHF. . Pt with active OT orders and activity orders for Up and OOB as tolerated. Pt lives with spouse in a ranch style home with 2 OMID with rails. Pt has walk-in shower and raised toilets. At baseline, pt was independent with all ADLs/IADLs. Pt completed supine to sit with mod I. Pt completed sit to stand with supervision. Pt completed functional mobility short in room distance with use of SPC. Pt completed stand to sit with supervision into chair. Pt reports feeling he is back to his baseline since his TKA 3 weeks ago with ADLs/IADLs. Upon evaluation, pt currently requires mod I for UB ADLs, supervision for LB ADLs, supervision for toileting, mod I for bed mobility, and supervision for functional mobility/transfers 2* the following deficits impacting occupational performance: weakness, decreased balance, decreased activity tolerance, SOB, and QUINN. Pt with the following personal factors of: OMID home environment, fall risk , and functional decline .  Despite above mentioned deficits and personal factors, pt is functioning near baseline level of performance. Limited ADL deficits. Based on the aforementioned OT evaluation, functional performance deficits, and assessments, pt has been identified as a moderate complexity evaluation. No further acute OT needs identified at this time. Recommend continued mobilization with hospital staff and restorative program while in the hospital to increase pt’s endurance and strength upon D/C. From OT standpoint, recommend D/C to home with family support when medically cleared. D/C pt from OT caseload at this time.   Plan   OT Frequency Eval only   Discharge Recommendation   Rehab Resource Intensity Level, OT No post-acute rehabilitation needs   Additional Comments  The patient's raw score on the AM-PAC Daily Activity Inpatient Short Form is 23 . A raw score of greater than or equal to 19 suggests the patient may benefit from discharge to home. Please refer to the recommendation of the Occupational Therapist for safe discharge planning.   AM-PAC Daily Activity Inpatient   Lower Body Dressing 3   Bathing 4   Toileting 4   Upper Body Dressing 4   Grooming 4   Eating 4   Daily Activity Raw Score 23   Daily Activity Standardized Score (Calc for Raw Score >=11) 51.12   AM-PAC Applied Cognition Inpatient   Following a Speech/Presentation 4   Understanding Ordinary Conversation 4   Taking Medications 4   Remembering Where Things Are Placed or Put Away 4   Remembering List of 4-5 Errands 4   Taking Care of Complicated Tasks 4   Applied Cognition Raw Score 24   Applied Cognition Standardized Score 62.21   End of Consult   Education Provided Yes;Family or social support of family present for education by provider   Patient Position at End of Consult Bedside chair;All needs within reach   Nurse Communication Nurse aware of consult   End of Consult Comments Pt seated OOB in chair at end of session. Call bell and phone within reach. All needs  met and pt reports no further questions for OT at this time.   Shireen Pulido, OT

## 2024-05-16 NOTE — ASSESSMENT & PLAN NOTE
PTA medications on hold due to transient hypotension  Trend blood pressures  Update medication list for correct Zebeta dosage

## 2024-05-16 NOTE — PLAN OF CARE

## 2024-05-17 ENCOUNTER — APPOINTMENT (INPATIENT)
Dept: RADIOLOGY | Facility: HOSPITAL | Age: 74
DRG: 871 | End: 2024-05-17
Payer: MEDICARE

## 2024-05-17 ENCOUNTER — APPOINTMENT (INPATIENT)
Dept: ULTRASOUND IMAGING | Facility: HOSPITAL | Age: 74
DRG: 871 | End: 2024-05-17
Payer: MEDICARE

## 2024-05-17 LAB
ALBUMIN SERPL BCP-MCNC: 3.5 G/DL (ref 3.5–5)
ALP SERPL-CCNC: 59 U/L (ref 34–104)
ALT SERPL W P-5'-P-CCNC: 17 U/L (ref 7–52)
ANION GAP SERPL CALCULATED.3IONS-SCNC: 7 MMOL/L (ref 4–13)
AST SERPL W P-5'-P-CCNC: 20 U/L (ref 13–39)
BASOPHILS # BLD AUTO: 0.05 THOUSANDS/ÂΜL (ref 0–0.1)
BASOPHILS NFR BLD AUTO: 0 % (ref 0–1)
BILIRUB DIRECT SERPL-MCNC: 0.62 MG/DL (ref 0–0.2)
BILIRUB SERPL-MCNC: 2.73 MG/DL (ref 0.2–1)
BUN SERPL-MCNC: 18 MG/DL (ref 5–25)
CALCIUM SERPL-MCNC: 8.5 MG/DL (ref 8.4–10.2)
CHLORIDE SERPL-SCNC: 102 MMOL/L (ref 96–108)
CO2 SERPL-SCNC: 25 MMOL/L (ref 21–32)
CREAT SERPL-MCNC: 0.86 MG/DL (ref 0.6–1.3)
EOSINOPHIL # BLD AUTO: 0.48 THOUSAND/ÂΜL (ref 0–0.61)
EOSINOPHIL NFR BLD AUTO: 3 % (ref 0–6)
ERYTHROCYTE [DISTWIDTH] IN BLOOD BY AUTOMATED COUNT: 15.1 % (ref 11.6–15.1)
GFR SERPL CREATININE-BSD FRML MDRD: 85 ML/MIN/1.73SQ M
GLUCOSE SERPL-MCNC: 121 MG/DL (ref 65–140)
HCT VFR BLD AUTO: 32.6 % (ref 36.5–49.3)
HGB BLD-MCNC: 10.1 G/DL (ref 12–17)
IMM GRANULOCYTES # BLD AUTO: 0.08 THOUSAND/UL (ref 0–0.2)
IMM GRANULOCYTES NFR BLD AUTO: 1 % (ref 0–2)
LYMPHOCYTES # BLD AUTO: 1.27 THOUSANDS/ÂΜL (ref 0.6–4.47)
LYMPHOCYTES NFR BLD AUTO: 9 % (ref 14–44)
MAGNESIUM SERPL-MCNC: 2.1 MG/DL (ref 1.9–2.7)
MCH RBC QN AUTO: 28.5 PG (ref 26.8–34.3)
MCHC RBC AUTO-ENTMCNC: 31 G/DL (ref 31.4–37.4)
MCV RBC AUTO: 92 FL (ref 82–98)
MONOCYTES # BLD AUTO: 1.66 THOUSAND/ÂΜL (ref 0.17–1.22)
MONOCYTES NFR BLD AUTO: 11 % (ref 4–12)
NEUTROPHILS # BLD AUTO: 11.18 THOUSANDS/ÂΜL (ref 1.85–7.62)
NEUTS SEG NFR BLD AUTO: 76 % (ref 43–75)
NRBC BLD AUTO-RTO: 0 /100 WBCS
PHOSPHATE SERPL-MCNC: 2.2 MG/DL (ref 2.3–4.1)
PLATELET # BLD AUTO: 398 THOUSANDS/UL (ref 149–390)
PMV BLD AUTO: 9 FL (ref 8.9–12.7)
POTASSIUM SERPL-SCNC: 3.9 MMOL/L (ref 3.5–5.3)
PROCALCITONIN SERPL-MCNC: 0.11 NG/ML
PROT SERPL-MCNC: 6.7 G/DL (ref 6.4–8.4)
RBC # BLD AUTO: 3.55 MILLION/UL (ref 3.88–5.62)
SODIUM SERPL-SCNC: 134 MMOL/L (ref 135–147)
WBC # BLD AUTO: 14.72 THOUSAND/UL (ref 4.31–10.16)

## 2024-05-17 PROCEDURE — 73030 X-RAY EXAM OF SHOULDER: CPT

## 2024-05-17 PROCEDURE — 94640 AIRWAY INHALATION TREATMENT: CPT

## 2024-05-17 PROCEDURE — 99232 SBSQ HOSP IP/OBS MODERATE 35: CPT | Performed by: FAMILY MEDICINE

## 2024-05-17 PROCEDURE — 84100 ASSAY OF PHOSPHORUS: CPT | Performed by: NURSE PRACTITIONER

## 2024-05-17 PROCEDURE — 83735 ASSAY OF MAGNESIUM: CPT | Performed by: NURSE PRACTITIONER

## 2024-05-17 PROCEDURE — 84145 PROCALCITONIN (PCT): CPT | Performed by: NURSE PRACTITIONER

## 2024-05-17 PROCEDURE — 73060 X-RAY EXAM OF HUMERUS: CPT

## 2024-05-17 PROCEDURE — 80053 COMPREHEN METABOLIC PANEL: CPT | Performed by: NURSE PRACTITIONER

## 2024-05-17 PROCEDURE — 94760 N-INVAS EAR/PLS OXIMETRY 1: CPT

## 2024-05-17 PROCEDURE — 85025 COMPLETE CBC W/AUTO DIFF WBC: CPT | Performed by: NURSE PRACTITIONER

## 2024-05-17 PROCEDURE — 94668 MNPJ CHEST WALL SBSQ: CPT

## 2024-05-17 PROCEDURE — 82248 BILIRUBIN DIRECT: CPT | Performed by: FAMILY MEDICINE

## 2024-05-17 PROCEDURE — 76705 ECHO EXAM OF ABDOMEN: CPT

## 2024-05-17 RX ORDER — ACETAMINOPHEN 325 MG/1
650 TABLET ORAL EVERY 6 HOURS PRN
Status: DISCONTINUED | OUTPATIENT
Start: 2024-05-17 | End: 2024-05-18 | Stop reason: HOSPADM

## 2024-05-17 RX ORDER — SODIUM CHLORIDE FOR INHALATION 3 %
4 VIAL, NEBULIZER (ML) INHALATION 3 TIMES DAILY
Status: DISCONTINUED | OUTPATIENT
Start: 2024-05-17 | End: 2024-05-17

## 2024-05-17 RX ORDER — LIDOCAINE 50 MG/G
1 PATCH TOPICAL DAILY
Status: DISCONTINUED | OUTPATIENT
Start: 2024-05-17 | End: 2024-05-18 | Stop reason: HOSPADM

## 2024-05-17 RX ORDER — SODIUM CHLORIDE FOR INHALATION 3 %
4 VIAL, NEBULIZER (ML) INHALATION 3 TIMES DAILY
Status: DISCONTINUED | OUTPATIENT
Start: 2024-05-17 | End: 2024-05-18 | Stop reason: HOSPADM

## 2024-05-17 RX ORDER — GUAIFENESIN 600 MG/1
600 TABLET, EXTENDED RELEASE ORAL EVERY 12 HOURS SCHEDULED
Status: DISCONTINUED | OUTPATIENT
Start: 2024-05-17 | End: 2024-05-18 | Stop reason: HOSPADM

## 2024-05-17 RX ORDER — OXYCODONE HYDROCHLORIDE 5 MG/1
5 TABLET ORAL EVERY 6 HOURS PRN
Status: DISCONTINUED | OUTPATIENT
Start: 2024-05-17 | End: 2024-05-17

## 2024-05-17 RX ORDER — FUROSEMIDE 40 MG/1
40 TABLET ORAL DAILY
Status: DISCONTINUED | OUTPATIENT
Start: 2024-05-17 | End: 2024-05-18 | Stop reason: HOSPADM

## 2024-05-17 RX ADMIN — ATORVASTATIN CALCIUM 20 MG: 20 TABLET, FILM COATED ORAL at 17:02

## 2024-05-17 RX ADMIN — DOXYCYCLINE 100 MG: 100 CAPSULE ORAL at 08:00

## 2024-05-17 RX ADMIN — BUDESONIDE INHALATION 0.5 MG: 0.5 SUSPENSION RESPIRATORY (INHALATION) at 07:22

## 2024-05-17 RX ADMIN — ASPIRIN 81 MG 81 MG: 81 TABLET ORAL at 08:00

## 2024-05-17 RX ADMIN — CEFTRIAXONE 2000 MG: 2 INJECTION, SOLUTION INTRAVENOUS at 08:01

## 2024-05-17 RX ADMIN — SODIUM CHLORIDE SOLN NEBU 3% 4 ML: 3 NEBU SOLN at 20:25

## 2024-05-17 RX ADMIN — BISOPROLOL FUMARATE 2.5 MG: 5 TABLET, FILM COATED ORAL at 08:00

## 2024-05-17 RX ADMIN — Medication 400 MG: at 08:00

## 2024-05-17 RX ADMIN — ACETAMINOPHEN 325MG 650 MG: 325 TABLET ORAL at 15:03

## 2024-05-17 RX ADMIN — ACETAMINOPHEN 325MG 650 MG: 325 TABLET ORAL at 22:14

## 2024-05-17 RX ADMIN — GUAIFENESIN 600 MG: 600 TABLET ORAL at 12:52

## 2024-05-17 RX ADMIN — Medication 400 MG: at 15:03

## 2024-05-17 RX ADMIN — FUROSEMIDE 40 MG: 40 TABLET ORAL at 09:41

## 2024-05-17 RX ADMIN — DIGOXIN 125 MCG: 125 TABLET ORAL at 08:00

## 2024-05-17 RX ADMIN — RIVAROXABAN 20 MG: 20 TABLET, FILM COATED ORAL at 08:00

## 2024-05-17 RX ADMIN — Medication 400 MG: at 20:17

## 2024-05-17 RX ADMIN — LIDOCAINE 1 PATCH: 50 PATCH CUTANEOUS at 16:00

## 2024-05-17 RX ADMIN — GUAIFENESIN 600 MG: 600 TABLET ORAL at 21:02

## 2024-05-17 RX ADMIN — BUDESONIDE INHALATION 0.5 MG: 0.5 SUSPENSION RESPIRATORY (INHALATION) at 20:25

## 2024-05-17 NOTE — ASSESSMENT & PLAN NOTE
Presents with 1 week of URI symptoms  Now with productive cough and dyspnea  CT chest with left lower lobe infiltrates  CT chest multifocal pneumonia.  Sputum cultures pending urine Legionella and strep is negative discontinue Doxy will continue on Rocephin will treat for 5 to 7 days Pro-Tay is negative but his symptoms are improving added nebulized treatments of saline to help him also cough up more.  And Mucinex continue flutter valve  No evidence of aspiration

## 2024-05-17 NOTE — PLAN OF CARE

## 2024-05-17 NOTE — ASSESSMENT & PLAN NOTE
Total bilirubin 3.41 improved - and worsened with sepsis - he does have some sclera icterus and some jaundice- checked direct and its lower then indirect will do us of abdomen ? Aman - needs follow up outpatient   Has been slowly climbing over the years  Outpatient workup for further evaluation

## 2024-05-17 NOTE — ASSESSMENT & PLAN NOTE
Wt Readings from Last 3 Encounters:   05/15/24 (!) 138 kg (303 lb 5.7 oz)   10/30/23 (!) 148 kg (327 lb)   09/21/21 (!) 143 kg (316 lb)   History CHF with preserved ejection fraction  Euvolemic on exam  Restart his Lasix 40 mg daily  Monitor volume status

## 2024-05-17 NOTE — ASSESSMENT & PLAN NOTE
COPD with mild exacerbation  Continue nebulized bronchodilators and home Pulmicort  No indication for IV steroids at this time  Continue to monitor  And discontinue doxycycline negative Legionella and strep continue Rocephin will treat for 5 to 7 days

## 2024-05-17 NOTE — ASSESSMENT & PLAN NOTE
Hx left TKR 4/25/2024 by Dr. Bernabe at [facility]  Well-healed incisions  Lower extremity edematous but I suspect normal postoperative swelling  Venous duplex is ordered by the ED, will complete study- neg for dvt  Outpatient follow-up

## 2024-05-17 NOTE — RESPIRATORY THERAPY NOTE
RT Protocol Note  Manjit Hernandez 73 y.o. male MRN: [id number]  Unit/Bed#: -01 Encounter: [id number]    Assessment    Principal Problem:    Left lower lobe pneumonia  Active Problems:    Morbid obesity (HCC)    Essential (primary) hypertension    Chronic obstructive pulmonary disease with acute lower respiratory infection (HCC)    PAF (paroxysmal atrial fibrillation) (HCC)    Chronic diastolic congestive heart failure (HCC)    History of total left knee replacement (TKR)    Severe sepsis (HCC)    Anemia    Hyperbilirubinemia      Home Pulmonary Medications:         Past Medical History:   Diagnosis Date    Arthritis     (L) hip    Asthma     Atrial fibrillation (HCC)     Cataract     PHIL    CHF (congestive heart failure) (HCC)     COPD (chronic obstructive pulmonary disease) (HCC)     Hernia of abdominal wall     Hip joint pain     (L)    [place] (hard of hearing)     phil hearing aides    Hypertension     Lung cancer (HCC)     Macular degeneration     phil    Psoriasis     elbows and ankles- small  red open areas left ankle    Pulmonary embolism (HCC)     Pulmonary emphysema (HCC)     Wears glasses      Social History     Socioeconomic History    Marital status: /Civil Union     Spouse name: None    Number of children: None    Years of education: None    Highest education level: None   Occupational History    None   Tobacco Use    Smoking status: Former     Current packs/day: 0.00     Average packs/day: 2.0 packs/day for 40.0 years (80.0 ttl pk-yrs)     Types: Cigarettes     Start date: 1973     Quit date: 2013     Years since quittin.3    Smokeless tobacco: Never   Vaping Use    Vaping status: Never Used   Substance and Sexual Activity    Alcohol use: Not Currently     Comment: 1x year    Drug use: No    Sexual activity: None   Other Topics Concern    None   Social History Narrative    None     Social Determinants of Health     Financial Resource Strain: Not on file   Food Insecurity: No  Food Insecurity (5/16/2024)    Hunger Vital Sign     Worried About Running Out of Food in the Last Year: Never true     Ran Out of Food in the Last Year: Never true   Transportation Needs: No Transportation Needs (5/16/2024)    PRAPARE - Transportation     Lack of Transportation (Medical): No     Lack of Transportation (Non-Medical): No   Physical Activity: Not on file   Stress: Not on file   Social Connections: Not on file   Intimate Partner Violence: Not on file   Housing Stability: Low Risk  (5/16/2024)    Housing Stability Vital Sign     Unable to Pay for Housing in the Last Year: No     Number of Times Moved in the Last Year: 1     Homeless in the Last Year: No       Subjective         Objective    Physical Exam:   Cough: None    Vitals:  Blood pressure 104/67, pulse 84, temperature (!) 97.3 °F (36.3 °C), resp. rate 17, weight (!) 138 kg (303 lb 5.7 oz), SpO2 (!) 89%.          Imaging and other studies: I have personally reviewed pertinent reports.            Plan    Respiratory Plan: Mild Distress pathway        Resp Comments: (P) Pt is atable on RA did well with flutter valve   no cough noted

## 2024-05-17 NOTE — QUICK NOTE
Right arm pain since last night forgort to mention on morning rounds ,wife at bedise. Hurts at upper arm on touch . He did not have any injury he can't pick it up due to pain denies shoulder pain I do not feel any abnormality -states tylenol helps  Will image shoulder and right upper extremity

## 2024-05-17 NOTE — ASSESSMENT & PLAN NOTE
Hemoglobin 10.3 on admission now 10.0  Was 12.7 on 4/25 prior to left TKR  Trend hemoglobin  Does have high bili although chronic will check hemolysis

## 2024-05-17 NOTE — ASSESSMENT & PLAN NOTE
Hypothermia, tachycardia, leukocytosis with transient hypotension fluid responsive  Did not receive full 30 mL/kg crystalloid fluid resuscitation secondary to history CHF and resolution of hypotension after smaller volume.  Lactic 1.9  Leukocytosis 17.66  Blood cultures pending  Trend fever curve, leukocytosis  resolveed

## 2024-05-17 NOTE — ASSESSMENT & PLAN NOTE
PTA bisoprolol and digoxin  Digoxin level subtherapeutic  Chronically anticoagulated with Eliquis  Admission EKG A-fib rate 102  Rate controlled

## 2024-05-17 NOTE — PROGRESS NOTES
[facility]  Progress Note  Name: Manjit Hernandez I  MRN: [id number]  Unit/Bed#: -Gladys I Date of Admission: 5/15/2024   Date of Service: 5/17/2024 I Hospital Day: 1    Assessment & Plan   PAF (paroxysmal atrial fibrillation) ([facility])  Assessment & Plan  PTA bisoprolol and digoxin  Digoxin level subtherapeutic  Chronically anticoagulated with Eliquis  Admission EKG A-fib rate 102  Rate controlled    * Severe sepsis (HCC)  Assessment & Plan  Hypothermia, tachycardia, leukocytosis with transient hypotension fluid responsive  Did not receive full 30 mL/kg crystalloid fluid resuscitation secondary to history CHF and resolution of hypotension after smaller volume.  Lactic 1.9  Leukocytosis 17.66  Blood cultures pending  Trend fever curve, leukocytosis  resolveed      Hyperbilirubinemia  Assessment & Plan  Total bilirubin 3.41 improved - and worsened with sepsis - he does have some sclera icterus and some jaundice- checked direct and its lower then indirect will do us of abdomen ? Aman - needs follow up outpatient   Has been slowly climbing over the years  Outpatient workup for further evaluation    Anemia  Assessment & Plan  Hemoglobin 10.3 on admission now 10.0  Was 12.7 on 4/25 prior to left TKR  Trend hemoglobin  Does have high bili although chronic will check hemolysis     History of total left knee replacement (TKR)  Assessment & Plan  Hx left TKR 4/25/2024 by Dr. Bernabe at [facility]  Well-healed incisions  Lower extremity edematous but I suspect normal postoperative swelling  Venous duplex is ordered by the ED, will complete study- neg for dvt  Outpatient follow-up    Multifocal pneumonia  Assessment & Plan  Presents with 1 week of URI symptoms  Now with productive cough and dyspnea  CT chest with left lower lobe infiltrates  CT chest multifocal pneumonia.  Sputum cultures pending urine Legionella and strep is negative discontinue Doxy will continue on Rocephin will treat for  5 to 7 days Pro-Tay is negative but his symptoms are improving added nebulized treatments of saline to help him also cough up more.  And Mucinex continue flutter valve  No evidence of aspiration    Chronic diastolic congestive heart failure (HCC)  Assessment & Plan  Wt Readings from Last 3 Encounters:   05/15/24 (!) 138 kg (303 lb 5.7 oz)   10/30/23 (!) 148 kg (327 lb)   09/21/21 (!) 143 kg (316 lb)   History CHF with preserved ejection fraction  Euvolemic on exam  Restart his Lasix 40 mg daily  Monitor volume status    Chronic obstructive pulmonary disease with acute lower respiratory infection (HCC)  Assessment & Plan  COPD with mild exacerbation  Continue nebulized bronchodilators and home Pulmicort  No indication for IV steroids at this time  Continue to monitor  And discontinue doxycycline negative Legionella and strep continue Rocephin will treat for 5 to 7 days    Essential (primary) hypertension  Assessment & Plan  stagble    Morbid obesity (HCC)  Assessment & Plan  BMI 40  Requires intensive lifestyle modification                 VTE Pharmacologic Prophylaxis: VTE Score: 4 Moderate Risk (Score 3-4) - Pharmacological DVT Prophylaxis Ordered: rivaroxaban (Xarelto).    Mobility:   Basic Mobility Inpatient Raw Score: 22  JH-HLM Goal: 7: Walk 25 feet or more  JH-HLM Achieved: 7: Walk 25 feet or more  JH-HLM Goal achieved. Continue to encourage appropriate mobility.    Patient Centered Rounds: I performed bedside rounds with nursing staff today.   Discussions with Specialists or Other Care Team Provider: none    Education and Discussions with Family / Patient: patient will update family     Total Time Spent on Date of Encounter in care of patient: >35 mins. This time was spent on one or more of the following: performing physical exam; counseling and coordination of care; obtaining or reviewing history; documenting in the medical record; reviewing/ordering tests, medications or procedures; communicating with other  healthcare professionals and discussing with patient's family/caregivers.    Current Length of Stay: 1 day(s)  Current Patient Status: Inpatient   Certification Statement: The patient will continue to require additional inpatient hospital stay due to pna  Discharge Plan: Anticipate discharge in 48-72 hrs to home.    Code Status: Level 1 - Full Code    Subjective:   Seen and examined sob improved no abd pain still coughing up mucus but not as much     Objective:     Vitals:   Temp (24hrs), Av.6 °F (36.4 °C), Min:97.3 °F (36.3 °C), Max:97.9 °F (36.6 °C)    Temp:  [97.3 °F (36.3 °C)-97.9 °F (36.6 °C)] 97.3 °F (36.3 °C)  HR:  [] 84  Resp:  [17-24] 17  BP: (104-132)/(67-85) 104/67  SpO2:  [89 %-91 %] 89 %  Body mass index is 40.02 kg/m².     Input and Output Summary (last 24 hours):     Intake/Output Summary (Last 24 hours) at 2024 1245  Last data filed at 2024 0901  Gross per 24 hour   Intake 1130 ml   Output 900 ml   Net 230 ml       Physical Exam:   Physical Exam  Vitals and nursing note reviewed.   Constitutional:       General: He is not in acute distress.     Appearance: He is well-developed.   HENT:      Head: Normocephalic and atraumatic.   Eyes:      Conjunctiva/sclera: Conjunctivae normal.      Comments: Icterus    Cardiovascular:      Rate and Rhythm: Normal rate and regular rhythm.      Heart sounds: No murmur heard.  Pulmonary:      Effort: Pulmonary effort is normal. No respiratory distress.      Breath sounds: Rhonchi present. No wheezing or rales.   Abdominal:      Palpations: Abdomen is soft.      Tenderness: There is no abdominal tenderness.   Musculoskeletal:         General: Swelling present.      Cervical back: Neck supple.   Skin:     General: Skin is warm and dry.      Capillary Refill: Capillary refill takes less than 2 seconds.      Coloration: Skin is jaundiced.   Neurological:      Mental Status: He is alert.   Psychiatric:         Mood and Affect: Mood normal.           Additional Data:     Labs:  Results from last 7 days   Lab Units 05/17/24  0452   WBC Thousand/uL 14.72*   HEMOGLOBIN g/dL 10.1*   HEMATOCRIT % 32.6*   PLATELETS Thousands/uL 398*   SEGS PCT % 76*   LYMPHO PCT % 9*   MONO PCT % 11   EOS PCT % 3     Results from last 7 days   Lab Units 05/17/24  0452   SODIUM mmol/L 134*   POTASSIUM mmol/L 3.9   CHLORIDE mmol/L 102   CO2 mmol/L 25   BUN mg/dL 18   CREATININE mg/dL 0.86   ANION GAP mmol/L 7   CALCIUM mg/dL 8.5   ALBUMIN g/dL 3.5   TOTAL BILIRUBIN mg/dL 2.73*   ALK PHOS U/L 59   ALT U/L 17   AST U/L 20   GLUCOSE RANDOM mg/dL 121     Results from last 7 days   Lab Units 05/15/24  2226   INR  3.00*             Results from last 7 days   Lab Units 05/17/24  0452 05/15/24  2226   LACTIC ACID mmol/L  --  1.9   PROCALCITONIN ng/ml 0.11 0.14       Lines/Drains:  Invasive Devices       Peripheral Intravenous Line  Duration             Peripheral IV 05/15/24 Left Antecubital 1 day                          Imaging: Reviewed radiology reports from this admission including: chest CT scan    Recent Cultures (last 7 days):   Results from last 7 days   Lab Units 05/16/24  0834 05/16/24  0833 05/15/24  2229 05/15/24  2226   BLOOD CULTURE   --   --  No Growth at 24 hrs. No Growth at 24 hrs.   GRAM STAIN RESULT   --  2+ Epithelial cells per low power field  No Polys or Bacteria seen  --   --    LEGIONELLA URINARY ANTIGEN  Negative  --   --   --        Last 24 Hours Medication List:   Current Facility-Administered Medications   Medication Dose Route Frequency Provider Last Rate    Artificial Tears  1 drop Both Eyes Q4H PRN Catherine S Waldo, CRNP      aspirin  81 mg Oral Daily Catherine S Waldo, CRNP      atorvastatin  20 mg Oral After Dinner Catherine S Waldo, CRNP      bisoprolol  2.5 mg Oral Daily Margo Sky MD      budesonide  0.5 mg Nebulization BID Catherine S Waldo, CRNP      cefTRIAXone  2,000 mg Intravenous Q24H Catherine S Waldo, CRNP 2,000 mg (05/17/24 0801)    digoxin  125 mcg Oral  Daily Catherine S Waldo, CRNP      furosemide  40 mg Oral Daily Margo Sky MD      guaiFENesin  600 mg Oral Q12H PENNY Margo Sky MD      ipratropium-albuterol  3 mL Nebulization Q6H PRN Catherine S Waldo, CRNP      magnesium Oxide  400 mg Oral TID Catherine S Waldo, CRNP      rivaroxaban  20 mg Oral Daily With Breakfast Catherine S Waldo, CRNP      sodium chloride  4 mL Nebulization TID Margo Sky MD          Today, Patient Was Seen By: Margo Sky MD    **Please Note: This note may have been constructed using a voice recognition system.**

## 2024-05-17 NOTE — PLAN OF CARE

## 2024-05-18 VITALS
BODY MASS INDEX: 40.02 KG/M2 | WEIGHT: 303.35 LBS | DIASTOLIC BLOOD PRESSURE: 81 MMHG | RESPIRATION RATE: 18 BRPM | HEART RATE: 83 BPM | OXYGEN SATURATION: 96 % | SYSTOLIC BLOOD PRESSURE: 134 MMHG | TEMPERATURE: 97.3 F

## 2024-05-18 PROBLEM — M79.601 RIGHT ARM PAIN: Status: ACTIVE | Noted: 2024-05-18

## 2024-05-18 LAB
ALBUMIN SERPL BCP-MCNC: 3.5 G/DL (ref 3.5–5)
ALP SERPL-CCNC: 64 U/L (ref 34–104)
ALT SERPL W P-5'-P-CCNC: 43 U/L (ref 7–52)
ANION GAP SERPL CALCULATED.3IONS-SCNC: 7 MMOL/L (ref 4–13)
AST SERPL W P-5'-P-CCNC: 46 U/L (ref 13–39)
BASOPHILS # BLD MANUAL: 0 THOUSAND/UL (ref 0–0.1)
BASOPHILS NFR MAR MANUAL: 0 % (ref 0–1)
BILIRUB SERPL-MCNC: 2.45 MG/DL (ref 0.2–1)
BLD SMEAR INTERP: NORMAL
BUN SERPL-MCNC: 20 MG/DL (ref 5–25)
BURR CELLS BLD QL SMEAR: PRESENT
CALCIUM SERPL-MCNC: 8.4 MG/DL (ref 8.4–10.2)
CHLORIDE SERPL-SCNC: 101 MMOL/L (ref 96–108)
CO2 SERPL-SCNC: 26 MMOL/L (ref 21–32)
CREAT SERPL-MCNC: 0.87 MG/DL (ref 0.6–1.3)
DAT POLY-SP REAG RBC QL: NEGATIVE
EOSINOPHIL # BLD MANUAL: 0.51 THOUSAND/UL (ref 0–0.4)
EOSINOPHIL NFR BLD MANUAL: 4 % (ref 0–6)
ERYTHROCYTE [DISTWIDTH] IN BLOOD BY AUTOMATED COUNT: 15 % (ref 11.6–15.1)
GFR SERPL CREATININE-BSD FRML MDRD: 85 ML/MIN/1.73SQ M
GLUCOSE SERPL-MCNC: 122 MG/DL (ref 65–140)
HCT VFR BLD AUTO: 33.2 % (ref 36.5–49.3)
HGB BLD-MCNC: 10.2 G/DL (ref 12–17)
LDH SERPL-CCNC: 219 U/L (ref 140–271)
LYMPHOCYTES # BLD AUTO: 1.78 THOUSAND/UL (ref 0.6–4.47)
LYMPHOCYTES # BLD AUTO: 14 % (ref 14–44)
MCH RBC QN AUTO: 28.3 PG (ref 26.8–34.3)
MCHC RBC AUTO-ENTMCNC: 30.7 G/DL (ref 31.4–37.4)
MCV RBC AUTO: 92 FL (ref 82–98)
MONOCYTES # BLD AUTO: 1.14 THOUSAND/UL (ref 0–1.22)
MONOCYTES NFR BLD: 9 % (ref 4–12)
NEUTROPHILS # BLD MANUAL: 9.27 THOUSAND/UL (ref 1.85–7.62)
NEUTS SEG NFR BLD AUTO: 73 % (ref 43–75)
PHOSPHATE SERPL-MCNC: 2.6 MG/DL (ref 2.3–4.1)
PLATELET # BLD AUTO: 383 THOUSANDS/UL (ref 149–390)
PLATELET BLD QL SMEAR: ADEQUATE
PMV BLD AUTO: 9 FL (ref 8.9–12.7)
POLYCHROMASIA BLD QL SMEAR: PRESENT
POTASSIUM SERPL-SCNC: 3.8 MMOL/L (ref 3.5–5.3)
PROT SERPL-MCNC: 6.6 G/DL (ref 6.4–8.4)
RBC # BLD AUTO: 3.61 MILLION/UL (ref 3.88–5.62)
RBC MORPH BLD: PRESENT
SCHISTOCYTES BLD QL SMEAR: PRESENT
SODIUM SERPL-SCNC: 134 MMOL/L (ref 135–147)
WBC # BLD AUTO: 12.7 THOUSAND/UL (ref 4.31–10.16)

## 2024-05-18 PROCEDURE — 99222 1ST HOSP IP/OBS MODERATE 55: CPT | Performed by: STUDENT IN AN ORGANIZED HEALTH CARE EDUCATION/TRAINING PROGRAM

## 2024-05-18 PROCEDURE — 85027 COMPLETE CBC AUTOMATED: CPT | Performed by: FAMILY MEDICINE

## 2024-05-18 PROCEDURE — 86880 COOMBS TEST DIRECT: CPT | Performed by: FAMILY MEDICINE

## 2024-05-18 PROCEDURE — 94760 N-INVAS EAR/PLS OXIMETRY 1: CPT

## 2024-05-18 PROCEDURE — 83615 LACTATE (LD) (LDH) ENZYME: CPT | Performed by: FAMILY MEDICINE

## 2024-05-18 PROCEDURE — 80053 COMPREHEN METABOLIC PANEL: CPT | Performed by: FAMILY MEDICINE

## 2024-05-18 PROCEDURE — 94640 AIRWAY INHALATION TREATMENT: CPT

## 2024-05-18 PROCEDURE — 99239 HOSP IP/OBS DSCHRG MGMT >30: CPT | Performed by: FAMILY MEDICINE

## 2024-05-18 PROCEDURE — 84100 ASSAY OF PHOSPHORUS: CPT | Performed by: FAMILY MEDICINE

## 2024-05-18 PROCEDURE — 85007 BL SMEAR W/DIFF WBC COUNT: CPT | Performed by: FAMILY MEDICINE

## 2024-05-18 PROCEDURE — 94668 MNPJ CHEST WALL SBSQ: CPT

## 2024-05-18 RX ORDER — FLUTICASONE PROPIONATE AND SALMETEROL 250; 50 UG/1; UG/1
1 POWDER RESPIRATORY (INHALATION) 2 TIMES DAILY
Qty: 60 BLISTER | Refills: 0 | Status: SHIPPED | OUTPATIENT
Start: 2024-05-18

## 2024-05-18 RX ORDER — METHOCARBAMOL 500 MG/1
500 TABLET, FILM COATED ORAL ONCE
Status: COMPLETED | OUTPATIENT
Start: 2024-05-18 | End: 2024-05-18

## 2024-05-18 RX ORDER — GUAIFENESIN 600 MG/1
600 TABLET, EXTENDED RELEASE ORAL EVERY 12 HOURS SCHEDULED
Qty: 20 TABLET | Refills: 0 | Status: SHIPPED | OUTPATIENT
Start: 2024-05-18

## 2024-05-18 RX ORDER — CEFDINIR 300 MG/1
300 CAPSULE ORAL EVERY 12 HOURS SCHEDULED
Qty: 8 CAPSULE | Refills: 0 | Status: SHIPPED | OUTPATIENT
Start: 2024-05-18 | End: 2024-05-22

## 2024-05-18 RX ADMIN — BISOPROLOL FUMARATE 2.5 MG: 5 TABLET, FILM COATED ORAL at 08:09

## 2024-05-18 RX ADMIN — GUAIFENESIN 600 MG: 600 TABLET ORAL at 08:09

## 2024-05-18 RX ADMIN — BUDESONIDE INHALATION 0.5 MG: 0.5 SUSPENSION RESPIRATORY (INHALATION) at 07:31

## 2024-05-18 RX ADMIN — SODIUM CHLORIDE SOLN NEBU 3% 4 ML: 3 NEBU SOLN at 07:31

## 2024-05-18 RX ADMIN — Medication 400 MG: at 08:09

## 2024-05-18 RX ADMIN — ASPIRIN 81 MG 81 MG: 81 TABLET ORAL at 08:09

## 2024-05-18 RX ADMIN — ACETAMINOPHEN 325MG 650 MG: 325 TABLET ORAL at 11:27

## 2024-05-18 RX ADMIN — CEFTRIAXONE 2000 MG: 2 INJECTION, SOLUTION INTRAVENOUS at 08:12

## 2024-05-18 RX ADMIN — DIGOXIN 125 MCG: 125 TABLET ORAL at 08:11

## 2024-05-18 RX ADMIN — RIVAROXABAN 20 MG: 20 TABLET, FILM COATED ORAL at 08:11

## 2024-05-18 RX ADMIN — FUROSEMIDE 40 MG: 40 TABLET ORAL at 08:09

## 2024-05-18 RX ADMIN — METHOCARBAMOL TABLETS 500 MG: 500 TABLET, COATED ORAL at 09:26

## 2024-05-18 NOTE — ASSESSMENT & PLAN NOTE
Hemoglobin 10.3 on admission now 10.6  Was 12.7 on 4/25 prior to left TKR  Trend hemoglobin  Direct lexa and ldh negative

## 2024-05-18 NOTE — PLAN OF CARE

## 2024-05-18 NOTE — ASSESSMENT & PLAN NOTE
Started after admission and does not know if he slept on it but also stating 1 day where switching something they pulled it is tender on palpation of the right arm x-rays are negative.  Gave Robaxin and Tylenol much improved and he is able to lift it now apparently the wife stated he had the same thing back in 2018

## 2024-05-18 NOTE — ASSESSMENT & PLAN NOTE
COPD with mild exacerbation  Continue nebulized bronchodilators and home Pulmicort- does not take pulmicort - seems waxela is cheapets will send to pharmacy   No indication for IV steroids at this time  Continue to monitor  And discontinue doxycycline negative Legionella and strep continue Rocephin will treat for 5 to 7 days- switch to cefdinir 300 mg po bid for 4 days

## 2024-05-18 NOTE — PLAN OF CARE

## 2024-05-18 NOTE — ASSESSMENT & PLAN NOTE
Hx left TKR 4/25/2024 by Dr. Bernabe at [facility]  Well-healed incisions  Lower extremity edematous but I suspect normal postoperative swelling  Venous duplex is ordered by the ED, will complete study- neg for dvt  Outpatient follow-up

## 2024-05-18 NOTE — ASSESSMENT & PLAN NOTE
Presents with 1 week of URI symptoms  Now with productive cough and dyspnea  CT chest with left lower lobe infiltrates  CT chest multifocal pneumonia.  Significant improvement no shortness of breath lungs sound much better he is not coughing as much switched over to cefdinir 300 mg p.o. twice daily for 4 more days.  He is currently on room air 96% was ambulating without the need of oxygen as well  No evidence of aspiration

## 2024-05-18 NOTE — ASSESSMENT & PLAN NOTE
Wt Readings from Last 3 Encounters:   05/15/24 (!) 138 kg (303 lb 5.7 oz)   10/30/23 (!) 148 kg (327 lb)   09/21/21 (!) 143 kg (316 lb)   History CHF with preserved ejection fraction  Euvolemic on exam  Continue  his Lasix 40 mg daily  Monitor volume status

## 2024-05-18 NOTE — CONSULTS
Orthopedics   Manjit Hernandez 73 y.o. male MRN: [id number]  Unit/Bed#: -[facility]ys      Chief Complaint:   right shoulder pain    HPI:  73 y.o.male right  hand dominant complaining of right shoulder pain . denies Headstrike, denies LOC, Pain is achy in character, Located intra artitcular, chronic in onset, constant in duration, moderate in intensity. Exacerbating factors movement, remitting factors rest. Neg radiating, neg  numbness, neg tingling, no open wounds noted. Currently hospitlaized for sepsis. The pain is not associated with constitutional symptoms. The patient is awoken at night by the pain. The patient has had minimal treatment. PMH significant for PAF, sepsis,PNA, chronic diastolic congetstive heart failure..     Patient has known rotator cuff arthropathy and has been without the ability to elevate his arm for many years. He has waxing and waining pain, that has increased during this admission. No trauma. No erythema, warmth.    Review Of Systems:   Skin: Normal  Neuro: See HPI  Musculoskeletal: See HPI  14 point review of systems negative except as stated above     Past Medical History:   Past Medical History:   Diagnosis Date    Arthritis     (L) hip    Asthma     Atrial fibrillation (HCC)     Cataract     PHIL    CHF (congestive heart failure) (HCC)     COPD (chronic obstructive pulmonary disease) (HCC)     Hernia of abdominal wall     Hip joint pain     (L)    [place] (hard of hearing)     phil hearing aides    Hypertension     Lung cancer (HCC)     Macular degeneration     phil    Psoriasis     elbows and ankles- small  red open areas left ankle    Pulmonary embolism (HCC)     Pulmonary emphysema (HCC)     Wears glasses        Past Surgical History:   Past Surgical History:   Procedure Laterality Date    CARPAL TUNNEL RELEASE Bilateral     CERVICAL FUSION      C 3/4    COLONOSCOPY      IN ARTHRP ACETBLR/PROX FEM PROSTC AGRFT/ALGRFT Left 2/26/2016    Procedure: ARTHROPLASTY HIP TOTAL;  Surgeon: Jewel  MD Mónica;  Location: [facility] OR;  Service: Orthopedics       Family History:  Family history reviewed and non-contributory  History reviewed. No pertinent family history.    Social History:  Social History     Socioeconomic History    Marital status: /Civil Union     Spouse name: None    Number of children: None    Years of education: None    Highest education level: None   Occupational History    None   Tobacco Use    Smoking status: Former     Current packs/day: 0.00     Average packs/day: 2.0 packs/day for 40.0 years (80.0 ttl pk-yrs)     Types: Cigarettes     Start date: 1973     Quit date: 2013     Years since quittin.3    Smokeless tobacco: Never   Vaping Use    Vaping status: Never Used   Substance and Sexual Activity    Alcohol use: Not Currently     Comment: 1x year    Drug use: No    Sexual activity: None   Other Topics Concern    None   Social History Narrative    None     Social Determinants of Health     Financial Resource Strain: Not on file   Food Insecurity: No Food Insecurity (2024)    Hunger Vital Sign     Worried About Running Out of Food in the Last Year: Never true     Ran Out of Food in the Last Year: Never true   Transportation Needs: No Transportation Needs (2024)    PRAPARE - Transportation     Lack of Transportation (Medical): No     Lack of Transportation (Non-Medical): No   Physical Activity: Not on file   Stress: Not on file   Social Connections: Not on file   Intimate Partner Violence: Not on file   Housing Stability: Low Risk  (2024)    Housing Stability Vital Sign     Unable to Pay for Housing in the Last Year: No     Number of Times Moved in the Last Year: 1     Homeless in the Last Year: No       Allergies:   Allergies   Allergen Reactions    Amoxicillin Rash    Polyethylene Glycol Hives    Lisinopril Other (See Comments)     dizziness    Gabapentin      Other reaction(s): PSORASIS FLAIR UP    Latanoprost Eye Swelling    Lumigan [Bimatoprost]  Other (See Comments)     Burning in eyes    Meloxicam GI Intolerance    Chlorhexidine Rash           Labs:  0   Lab Value Date/Time    HCT 33.2 (L) 05/18/2024 0507    HCT 32.6 (L) 05/17/2024 0452    HCT 33.1 (L) 05/15/2024 2226    HGB 10.2 (L) 05/18/2024 0507    HGB 10.1 (L) 05/17/2024 0452    HGB 10.3 (L) 05/15/2024 2226    INR 3.00 (H) 05/15/2024 2226    WBC 12.70 (H) 05/18/2024 0507    WBC 14.72 (H) 05/17/2024 0452    WBC 17.66 (H) 05/15/2024 2226    CRP 20.8 (H) 10/31/2023 0532       Meds:    Current Facility-Administered Medications:     acetaminophen (TYLENOL) tablet 650 mg, 650 mg, Oral, Q6H PRN, Margo Sky MD, 650 mg at 05/18/24 1127    Artificial Tears ophthalmic solution 1 drop, 1 drop, Both Eyes, Q4H PRN, Catherine S Waldo, CRNP    aspirin chewable tablet 81 mg, 81 mg, Oral, Daily, Catherine S Waldo, CRNP, 81 mg at 05/18/24 0809    atorvastatin (LIPITOR) tablet 20 mg, 20 mg, Oral, After Dinner, Catherine S Waldo, CRNP, 20 mg at 05/17/24 1702    bisoprolol (ZEBETA) tablet 2.5 mg, 2.5 mg, Oral, Daily, Margo Sky MD, 2.5 mg at 05/18/24 0809    budesonide (PULMICORT) inhalation solution 0.5 mg, 0.5 mg, Nebulization, BID, Catherine S Waldo, CRNP, 0.5 mg at 05/18/24 0731    cefTRIAXone (ROCEPHIN) IVPB (premix in dextrose) 2,000 mg 50 mL, 2,000 mg, Intravenous, Q24H, Catherine S Waldo, CRNP, Last Rate: 100 mL/hr at 05/18/24 0812, 2,000 mg at 05/18/24 0812    digoxin (LANOXIN) tablet 125 mcg, 125 mcg, Oral, Daily, AYO HudsonNP, 125 mcg at 05/18/24 0811    furosemide (LASIX) tablet 40 mg, 40 mg, Oral, Daily, Margo Sky MD, 40 mg at 05/18/24 0809    guaiFENesin (MUCINEX) 12 hr tablet 600 mg, 600 mg, Oral, Q12H PENNY, Margo Sky MD, 600 mg at 05/18/24 0809    ipratropium-albuterol (DUO-NEB) 0.5-2.5 mg/3 mL inhalation solution 3 mL, 3 mL, Nebulization, Q6H PRN, Catherine Akhtarx, CRNP    lidocaine (LIDODERM) 5 % patch 1 patch, 1 patch, Topical, Daily, Margo Sky MD, 1 patch at 05/17/24 1600    magnesium  "Oxide (MAG-OX) tablet 400 mg, 400 mg, Oral, TID, Catherine SANTIAGO Waldo, CRNP, 400 mg at 05/18/24 0809    rivaroxaban (XARELTO) tablet 20 mg, 20 mg, Oral, Daily With Breakfast, Catherine SANTIAGO Waldo, CRNP, 20 mg at 05/18/24 0811    sodium chloride 3 % inhalation solution 4 mL, 4 mL, Nebulization, TID, Margo Sky MD, 4 mL at 05/18/24 0731    Blood Culture:   Lab Results   Component Value Date    BLOODCX No Growth at 48 hrs. 05/15/2024       Wound Culture:   No results found for: \"WOUNDCULT\"    Ins and Outs:  I/O last 24 hours:  In: 650 [P.O.:600; IV Piggyback:50]  Out: 1550 [Urine:1550]          Physical Exam:   /81   Pulse 83   Temp (!) 97.3 °F (36.3 °C)   Resp 18   Wt (!) 138 kg (303 lb 5.7 oz)   SpO2 96%   BMI 40.02 kg/m²   Gen: No acute distress, resting comfortably in bed  HEENT: Eyes clear, moist mucus membranes, hearing intact  Respiratory: No audible wheezing or stridor  Cardiovascular: Well Perfused peripherally, 2+ distal pulse  Abdomen: nondistended, no peritoneal signs  Musculoskeletal: right upper extremity  Skin pink dry and intact  Painful range of motion  Active ROM minimal, states is chornic  Sensation intact to axillary, musculocutaneous, radial, ulna, median nerves  5/5 motor strength to axillary, musculocutaneous, radial, ulna, median nerves    2+ radial and ulnar pulse   Musculature is soft and compressible, no pain with passive stretch    Radiology:   I personally reviewed the films.  X-rays AP/Lateral views of right shoulder shows superior humneral migration, osteophytic growth, rotator cuff arthropathy      Assessment:  73 y.o.male with  right shoulder rotator cuff arthropathy    Plan:   No acute orthopedic surgical intervention required. No evidence of septic joint  Follow up with home orthopedic surgeon for any outpatient follow if desired.   NSAIDs if allowable for pain  Ice to shoulder      Nathan Miramontes, DO   "

## 2024-05-18 NOTE — ASSESSMENT & PLAN NOTE
Total bilirubin 3.41 improved - and worsened with sepsis - he does have some sclera icterus and some jaundice-ultrasound of the liver reveals fatty liver currently is improved has been slowly climbing over the years  Outpatient workup for further evaluation discussed with wife and discussed will make a referral to outpatient gastroenterology

## 2024-05-18 NOTE — DISCHARGE SUMMARY
[facility]  Discharge- Manjit Hernandez 1950, 73 y.o. male MRN: [id number]  Unit/Bed#: [facility] [place] Encounter: [id number]  Primary Care Provider: Vicki Cota DO   Date and time admitted to hospital: 5/15/2024 10:10 PM    PAF (paroxysmal atrial fibrillation) ([facility])  Assessment & Plan  PTA bisoprolol and digoxin  Digoxin level subtherapeutic  Chronically anticoagulated with Eliquis  Admission EKG A-fib rate 102  Rate controlled    * Severe sepsis (HCC)  Assessment & Plan  Hypothermia, tachycardia, leukocytosis with transient hypotension fluid responsive  Did not receive full 30 mL/kg crystalloid fluid resuscitation secondary to history CHF and resolution of hypotension after smaller volume.  Lactic 1.9  Leukocytosis 17.66  Blood cultures pending  Trend fever curve, leukocytosis  resolveed      Right arm pain  Assessment & Plan  Started after admission and does not know if he slept on it but also stating 1 day where switching something they pulled it is tender on palpation of the right arm x-rays are negative.  Gave Robaxin and Tylenol much improved and he is able to lift it now apparently the wife stated he had the same thing back in 2018    Hyperbilirubinemia  Assessment & Plan  Total bilirubin 3.41 improved - and worsened with sepsis - he does have some sclera icterus and some jaundice-ultrasound of the liver reveals fatty liver currently is improved has been slowly climbing over the years  Outpatient workup for further evaluation discussed with wife and discussed will make a referral to outpatient gastroenterology    Anemia  Assessment & Plan  Hemoglobin 10.3 on admission now 10.6  Was 12.7 on 4/25 prior to left TKR  Trend hemoglobin  Direct lexa and ldh negative     History of total left knee replacement (TKR)  Assessment & Plan  Hx left TKR 4/25/2024 by Dr. Bernabe at [facility]  Well-healed incisions  Lower extremity edematous but I suspect normal postoperative  swelling  Venous duplex is ordered by the ED, will complete study- neg for dvt  Outpatient follow-up    Multifocal pneumonia  Assessment & Plan  Presents with 1 week of URI symptoms  Now with productive cough and dyspnea  CT chest with left lower lobe infiltrates  CT chest multifocal pneumonia.  Significant improvement no shortness of breath lungs sound much better he is not coughing as much switched over to cefdinir 300 mg p.o. twice daily for 4 more days.  He is currently on room air 96% was ambulating without the need of oxygen as well  No evidence of aspiration    Chronic diastolic congestive heart failure (HCC)  Assessment & Plan  Wt Readings from Last 3 Encounters:   05/15/24 (!) 138 kg (303 lb 5.7 oz)   10/30/23 (!) 148 kg (327 lb)   09/21/21 (!) 143 kg (316 lb)   History CHF with preserved ejection fraction  Euvolemic on exam  Continue  his Lasix 40 mg daily  Monitor volume status    Chronic obstructive pulmonary disease with acute lower respiratory infection (HCC)  Assessment & Plan  COPD with mild exacerbation  Continue nebulized bronchodilators and home Pulmicort- does not take pulmicort - seems waxela is cheapets will send to pharmacy   No indication for IV steroids at this time  Continue to monitor  And discontinue doxycycline negative Legionella and strep continue Rocephin will treat for 5 to 7 days- switch to cefdinir 300 mg po bid for 4 days     Essential (primary) hypertension  Assessment & Plan  stagble    Morbid obesity (HCC)  Assessment & Plan  BMI 40  Requires intensive lifestyle modification          Medical Problems       Resolved Problems  Date Reviewed: 5/18/2024   None       Discharging Physician / Practitioner: Margo Sky MD  PCP: Vicki Cota DO  Admission Date:   Admission Orders (From admission, onward)       Ordered        05/16/24 0010  INPATIENT ADMISSION  Once                          Discharge Date: 05/18/24    Consultations During Hospital Stay:  Speech  therapy    Procedures Performed:   none    Significant Findings / Test Results:   CTA chest PE study- 1. No pulmonary embolism.  2. Multifocal infiltrates in the right upper lobe with left lower lobe infiltrate and progression of mediastinal and hilar adenopathy. Adenopathy could be reactive. In view of patient's history of right lung malignancy possibility of neoplastic   adenopathy cannot be fully excluded.  Short interval follow-up in 2- 3 months interval with CT with contrast is recommended to assess resolution of infiltrates and adenopathy and exclude neoplastic etiology of adenopathy.  3. Stable 1.5 cm left adrenal nodule.  This has been described and of similar size on prior outside MRI from [facility] from 2016 suggesting a benign etiology.  Preliminary reading of no pulmonary embolism and left lower lobe infiltrate was given by Dr. Raj Baum from [facility] on 5/16/2024 at 12:40 a.m.  Venous duplex is negative  Ultrasound of the right upper quadrant to moderate pain about a megaly and mild fatty liver change  X-ray of the humerus and the shoulder negative for acute abnormalities  Incidental Findings:   See above to be followed by pcp with repeat imaging 2-3 month     Test Results Pending at Discharge (will require follow up):   none     Outpatient Tests Requested:  none    Complications:  none    Reason for Admission: Severe sepsis    Hospital Course:   Manjit Hernandez is a 73 y.o. male patient who originally presented to the hospital on 5/15/2024 due to severe sepsis secondary to multifocal pneumonia aspiration ruled out COPD without any evidence of exacerbation started on antibiotics and significant improvement of symptoms.  He was able to ambulate without oxygen his lungs sound much better he is Legionella and strep are negative RP 2 panel was negative.  Will be discharged home with cefdinir for 4 more days.  Will need repeat of a CT 2 to 3 months of the chest to be ordered by his  PCP.        Please see above list of diagnoses and related plan for additional information.     Condition at Discharge: stable    Discharge Day Visit / Exam:   Subjective:  seen and examined breathing better , just still right arm pain its hard to lift arm due to that but wants to go home  Vitals: Blood Pressure: 134/81 (05/18/24 0802)  Pulse: 83 (05/18/24 0811)  Temperature: (!) 97.3 °F (36.3 °C) (05/18/24 0802)  Temp Source: Temporal (05/16/24 0804)  Respirations: 18 (05/18/24 0802)  Weight - Scale: (!) 138 kg (303 lb 5.7 oz) (05/15/24 2214)  SpO2: 96 % (05/18/24 0802)  Exam:   Physical Exam  Vitals and nursing note reviewed.   Constitutional:       General: He is not in acute distress.     Appearance: He is well-developed.   HENT:      Head: Normocephalic and atraumatic.   Eyes:      Conjunctiva/sclera: Conjunctivae normal.      Comments: Sclera icterus    Cardiovascular:      Rate and Rhythm: Normal rate and regular rhythm.      Heart sounds: No murmur heard.  Pulmonary:      Effort: Pulmonary effort is normal. No respiratory distress.      Breath sounds: Rales (bibasilar) present. No wheezing.   Abdominal:      Palpations: Abdomen is soft.      Tenderness: There is no abdominal tenderness.   Musculoskeletal:         General: Swelling (mild baselinne) present.        Arms:       Cervical back: Neck supple.   Skin:     General: Skin is warm and dry.      Capillary Refill: Capillary refill takes less than 2 seconds.      Coloration: Skin is jaundiced.   Neurological:      Mental Status: He is alert and oriented to person, place, and time.   Psychiatric:         Mood and Affect: Mood normal.          Discussion with Family: Updated  (wife) at bedside.    Discharge instructions/Information to patient and family:   See after visit summary for information provided to patient and family.      Provisions for Follow-Up Care:  See after visit summary for information related to follow-up care and any  pertinent home health orders.      Mobility at time of Discharge:   Basic Mobility Inpatient Raw Score: 22  JH-HLM Goal: 7: Walk 25 feet or more  JH-HLM Achieved: 7: Walk 25 feet or more  HLM Goal achieved. Continue to encourage appropriate mobility.     Disposition:   Home    Planned Readmission: no     Discharge Statement:  I spent >35 minutes discharging the patient. This time was spent on the day of discharge. I had direct contact with the patient on the day of discharge. Greater than 50% of the total time was spent examining patient, answering all patient questions, arranging and discussing plan of care with patient as well as directly providing post-discharge instructions.  Additional time then spent on discharge activities.    Discharge Medications:  See after visit summary for reconciled discharge medications provided to patient and/or family.      **Please Note: This note may have been constructed using a voice recognition system**

## 2024-05-19 LAB
BACTERIA SPT RESP CULT: ABNORMAL
BACTERIA SPT RESP CULT: ABNORMAL
GRAM STN SPEC: ABNORMAL
GRAM STN SPEC: ABNORMAL

## 2024-05-21 LAB
BACTERIA BLD CULT: NORMAL
BACTERIA BLD CULT: NORMAL

## 2024-06-16 PROBLEM — J18.9 MULTIFOCAL PNEUMONIA: Status: RESOLVED | Noted: 2024-05-16 | Resolved: 2024-06-16

## 2024-06-16 PROBLEM — R65.20 SEVERE SEPSIS (HCC): Status: RESOLVED | Noted: 2024-05-16 | Resolved: 2024-06-16

## 2024-06-16 PROBLEM — A41.9 SEVERE SEPSIS (HCC): Status: RESOLVED | Noted: 2024-05-16 | Resolved: 2024-06-16

## 2024-08-07 ENCOUNTER — APPOINTMENT (OUTPATIENT)
Dept: LAB | Facility: HOSPITAL | Age: 74
End: 2024-08-07
Payer: MEDICARE

## 2024-08-07 DIAGNOSIS — C34.90 MALIGNANT NEOPLASM OF BRONCHUS AND LUNG (HCC): ICD-10-CM

## 2024-08-07 DIAGNOSIS — J18.9 UNRESOLVED PNEUMONIA: ICD-10-CM

## 2024-08-07 LAB
ANION GAP SERPL CALCULATED.3IONS-SCNC: 8 MMOL/L (ref 4–13)
BUN SERPL-MCNC: 25 MG/DL (ref 5–25)
CALCIUM SERPL-MCNC: 9.2 MG/DL (ref 8.4–10.2)
CHLORIDE SERPL-SCNC: 102 MMOL/L (ref 96–108)
CO2 SERPL-SCNC: 27 MMOL/L (ref 21–32)
CREAT SERPL-MCNC: 0.94 MG/DL (ref 0.6–1.3)
GFR SERPL CREATININE-BSD FRML MDRD: 78 ML/MIN/1.73SQ M
GLUCOSE P FAST SERPL-MCNC: 117 MG/DL (ref 65–99)
POTASSIUM SERPL-SCNC: 4.3 MMOL/L (ref 3.5–5.3)
SODIUM SERPL-SCNC: 137 MMOL/L (ref 135–147)

## 2024-08-07 PROCEDURE — 80048 BASIC METABOLIC PNL TOTAL CA: CPT

## 2024-08-07 PROCEDURE — 36415 COLL VENOUS BLD VENIPUNCTURE: CPT

## 2024-09-19 ENCOUNTER — APPOINTMENT (OUTPATIENT)
Dept: LAB | Facility: HOSPITAL | Age: 74
End: 2024-09-19
Payer: MEDICARE

## 2024-09-19 DIAGNOSIS — E78.2 MIXED HYPERLIPIDEMIA: ICD-10-CM

## 2024-09-19 DIAGNOSIS — E11.69 DIABETES MELLITUS ASSOCIATED WITH HORMONAL ETIOLOGY (HCC): ICD-10-CM

## 2024-09-19 DIAGNOSIS — E27.8 ABNORMALITY OF CORTISOL-BINDING GLOBULIN (HCC): ICD-10-CM

## 2024-09-19 DIAGNOSIS — I10 ESSENTIAL HYPERTENSION, BENIGN: ICD-10-CM

## 2024-09-19 LAB
ALBUMIN SERPL BCG-MCNC: 3.9 G/DL (ref 3.5–5)
ALP SERPL-CCNC: 70 U/L (ref 34–104)
ALT SERPL W P-5'-P-CCNC: 16 U/L (ref 7–52)
ANION GAP SERPL CALCULATED.3IONS-SCNC: 4 MMOL/L (ref 4–13)
AST SERPL W P-5'-P-CCNC: 18 U/L (ref 13–39)
BACTERIA UR QL AUTO: ABNORMAL /HPF
BASOPHILS # BLD AUTO: 0.04 THOUSANDS/ΜL (ref 0–0.1)
BASOPHILS NFR BLD AUTO: 0 % (ref 0–1)
BILIRUB SERPL-MCNC: 2.57 MG/DL (ref 0.2–1)
BILIRUB UR QL STRIP: NEGATIVE
BUN SERPL-MCNC: 34 MG/DL (ref 5–25)
CALCIUM SERPL-MCNC: 9.3 MG/DL (ref 8.4–10.2)
CHLORIDE SERPL-SCNC: 106 MMOL/L (ref 96–108)
CHOLEST SERPL-MCNC: 104 MG/DL
CLARITY UR: CLEAR
CO2 SERPL-SCNC: 31 MMOL/L (ref 21–32)
COLOR UR: YELLOW
CREAT SERPL-MCNC: 0.95 MG/DL (ref 0.6–1.3)
EOSINOPHIL # BLD AUTO: 0.21 THOUSAND/ΜL (ref 0–0.61)
EOSINOPHIL NFR BLD AUTO: 2 % (ref 0–6)
ERYTHROCYTE [DISTWIDTH] IN BLOOD BY AUTOMATED COUNT: 18.7 % (ref 11.6–15.1)
EST. AVERAGE GLUCOSE BLD GHB EST-MCNC: 148 MG/DL
FOLATE SERPL-MCNC: 14.1 NG/ML
GFR SERPL CREATININE-BSD FRML MDRD: 77 ML/MIN/1.73SQ M
GLUCOSE P FAST SERPL-MCNC: 106 MG/DL (ref 65–99)
GLUCOSE UR STRIP-MCNC: NEGATIVE MG/DL
HBA1C MFR BLD: 6.8 %
HCT VFR BLD AUTO: 44.8 % (ref 36.5–49.3)
HDLC SERPL-MCNC: 49 MG/DL
HGB BLD-MCNC: 13.8 G/DL (ref 12–17)
HGB UR QL STRIP.AUTO: NEGATIVE
HYALINE CASTS #/AREA URNS LPF: ABNORMAL /LPF
IMM GRANULOCYTES # BLD AUTO: 0.05 THOUSAND/UL (ref 0–0.2)
IMM GRANULOCYTES NFR BLD AUTO: 1 % (ref 0–2)
KETONES UR STRIP-MCNC: NEGATIVE MG/DL
LDLC SERPL CALC-MCNC: 47 MG/DL (ref 0–100)
LEUKOCYTE ESTERASE UR QL STRIP: NEGATIVE
LYMPHOCYTES # BLD AUTO: 1.38 THOUSANDS/ΜL (ref 0.6–4.47)
LYMPHOCYTES NFR BLD AUTO: 14 % (ref 14–44)
MCH RBC QN AUTO: 26.3 PG (ref 26.8–34.3)
MCHC RBC AUTO-ENTMCNC: 30.8 G/DL (ref 31.4–37.4)
MCV RBC AUTO: 86 FL (ref 82–98)
MONOCYTES # BLD AUTO: 1.1 THOUSAND/ΜL (ref 0.17–1.22)
MONOCYTES NFR BLD AUTO: 11 % (ref 4–12)
NEUTROPHILS # BLD AUTO: 7.06 THOUSANDS/ΜL (ref 1.85–7.62)
NEUTS SEG NFR BLD AUTO: 72 % (ref 43–75)
NITRITE UR QL STRIP: NEGATIVE
NON-SQ EPI CELLS URNS QL MICRO: ABNORMAL /HPF
NONHDLC SERPL-MCNC: 55 MG/DL
NRBC BLD AUTO-RTO: 0 /100 WBCS
PH UR STRIP.AUTO: 6 [PH]
PLATELET # BLD AUTO: 269 THOUSANDS/UL (ref 149–390)
PMV BLD AUTO: 9.3 FL (ref 8.9–12.7)
POTASSIUM SERPL-SCNC: 4.5 MMOL/L (ref 3.5–5.3)
PROT SERPL-MCNC: 7.3 G/DL (ref 6.4–8.4)
PROT UR STRIP-MCNC: NEGATIVE MG/DL
RBC # BLD AUTO: 5.24 MILLION/UL (ref 3.88–5.62)
RBC #/AREA URNS AUTO: ABNORMAL /HPF
SODIUM SERPL-SCNC: 141 MMOL/L (ref 135–147)
SP GR UR STRIP.AUTO: 1.02 (ref 1–1.03)
T4 FREE SERPL-MCNC: 0.77 NG/DL (ref 0.61–1.12)
TRIGL SERPL-MCNC: 41 MG/DL
TSH SERPL DL<=0.05 MIU/L-ACNC: 1.86 UIU/ML (ref 0.45–4.5)
URATE SERPL-MCNC: 6.4 MG/DL (ref 3.5–8.5)
UROBILINOGEN UR QL STRIP.AUTO: 0.2 E.U./DL
VIT B12 SERPL-MCNC: 352 PG/ML (ref 180–914)
WBC # BLD AUTO: 9.84 THOUSAND/UL (ref 4.31–10.16)
WBC #/AREA URNS AUTO: ABNORMAL /HPF

## 2024-09-19 PROCEDURE — 80061 LIPID PANEL: CPT

## 2024-09-19 PROCEDURE — 82607 VITAMIN B-12: CPT

## 2024-09-19 PROCEDURE — 84443 ASSAY THYROID STIM HORMONE: CPT

## 2024-09-19 PROCEDURE — 81001 URINALYSIS AUTO W/SCOPE: CPT

## 2024-09-19 PROCEDURE — 36415 COLL VENOUS BLD VENIPUNCTURE: CPT

## 2024-09-19 PROCEDURE — 80053 COMPREHEN METABOLIC PANEL: CPT

## 2024-09-19 PROCEDURE — 82746 ASSAY OF FOLIC ACID SERUM: CPT

## 2024-09-19 PROCEDURE — 85025 COMPLETE CBC W/AUTO DIFF WBC: CPT

## 2024-09-19 PROCEDURE — 83036 HEMOGLOBIN GLYCOSYLATED A1C: CPT

## 2024-09-19 PROCEDURE — 84550 ASSAY OF BLOOD/URIC ACID: CPT

## 2024-09-19 PROCEDURE — 84439 ASSAY OF FREE THYROXINE: CPT

## 2024-11-29 ENCOUNTER — APPOINTMENT (OUTPATIENT)
Dept: LAB | Facility: HOSPITAL | Age: 74
End: 2024-11-29
Payer: MEDICARE

## 2024-11-29 DIAGNOSIS — I48.19 PERSISTENT ATRIAL FIBRILLATION (HCC): Primary | ICD-10-CM

## 2024-11-29 DIAGNOSIS — E27.8 ABNORMALITY OF CORTISOL-BINDING GLOBULIN (HCC): ICD-10-CM

## 2024-11-29 DIAGNOSIS — E78.2 MIXED HYPERLIPIDEMIA: ICD-10-CM

## 2024-11-29 DIAGNOSIS — E11.69 DIABETES MELLITUS ASSOCIATED WITH HORMONAL ETIOLOGY (HCC): ICD-10-CM

## 2024-11-29 DIAGNOSIS — I10 ESSENTIAL HYPERTENSION, MALIGNANT: ICD-10-CM

## 2024-11-29 LAB
ALBUMIN SERPL BCG-MCNC: 3.9 G/DL (ref 3.5–5)
ALP SERPL-CCNC: 65 U/L (ref 34–104)
ALT SERPL W P-5'-P-CCNC: 17 U/L (ref 7–52)
ANION GAP SERPL CALCULATED.3IONS-SCNC: 7 MMOL/L (ref 4–13)
AST SERPL W P-5'-P-CCNC: 18 U/L (ref 13–39)
BACTERIA UR QL AUTO: NORMAL /HPF
BASOPHILS # BLD AUTO: 0.05 THOUSANDS/ΜL (ref 0–0.1)
BASOPHILS NFR BLD AUTO: 0 % (ref 0–1)
BILIRUB SERPL-MCNC: 2.17 MG/DL (ref 0.2–1)
BILIRUB UR QL STRIP: NEGATIVE
BUN SERPL-MCNC: 36 MG/DL (ref 5–25)
CALCIUM SERPL-MCNC: 9 MG/DL (ref 8.4–10.2)
CHLORIDE SERPL-SCNC: 102 MMOL/L (ref 96–108)
CHOLEST SERPL-MCNC: 120 MG/DL (ref ?–200)
CLARITY UR: CLEAR
CO2 SERPL-SCNC: 30 MMOL/L (ref 21–32)
COLOR UR: YELLOW
CREAT SERPL-MCNC: 0.91 MG/DL (ref 0.6–1.3)
EOSINOPHIL # BLD AUTO: 0.29 THOUSAND/ΜL (ref 0–0.61)
EOSINOPHIL NFR BLD AUTO: 2 % (ref 0–6)
ERYTHROCYTE [DISTWIDTH] IN BLOOD BY AUTOMATED COUNT: 15.5 % (ref 11.6–15.1)
EST. AVERAGE GLUCOSE BLD GHB EST-MCNC: 151 MG/DL
FOLATE SERPL-MCNC: 7.9 NG/ML
GFR SERPL CREATININE-BSD FRML MDRD: 82 ML/MIN/1.73SQ M
GLUCOSE P FAST SERPL-MCNC: 85 MG/DL (ref 65–99)
GLUCOSE UR STRIP-MCNC: NEGATIVE MG/DL
HBA1C MFR BLD: 6.9 %
HCT VFR BLD AUTO: 45.9 % (ref 36.5–49.3)
HDLC SERPL-MCNC: 54 MG/DL
HGB BLD-MCNC: 14.3 G/DL (ref 12–17)
HGB UR QL STRIP.AUTO: NEGATIVE
IMM GRANULOCYTES # BLD AUTO: 0.05 THOUSAND/UL (ref 0–0.2)
IMM GRANULOCYTES NFR BLD AUTO: 0 % (ref 0–2)
KETONES UR STRIP-MCNC: NEGATIVE MG/DL
LDLC SERPL CALC-MCNC: 55 MG/DL (ref 0–100)
LEUKOCYTE ESTERASE UR QL STRIP: NEGATIVE
LYMPHOCYTES # BLD AUTO: 2.4 THOUSANDS/ΜL (ref 0.6–4.47)
LYMPHOCYTES NFR BLD AUTO: 18 % (ref 14–44)
MAGNESIUM SERPL-MCNC: 2 MG/DL (ref 1.9–2.7)
MCH RBC QN AUTO: 28.4 PG (ref 26.8–34.3)
MCHC RBC AUTO-ENTMCNC: 31.2 G/DL (ref 31.4–37.4)
MCV RBC AUTO: 91 FL (ref 82–98)
MONOCYTES # BLD AUTO: 1.35 THOUSAND/ΜL (ref 0.17–1.22)
MONOCYTES NFR BLD AUTO: 10 % (ref 4–12)
NEUTROPHILS # BLD AUTO: 9.52 THOUSANDS/ΜL (ref 1.85–7.62)
NEUTS SEG NFR BLD AUTO: 70 % (ref 43–75)
NITRITE UR QL STRIP: NEGATIVE
NON-SQ EPI CELLS URNS QL MICRO: NORMAL /HPF
NONHDLC SERPL-MCNC: 66 MG/DL
NRBC BLD AUTO-RTO: 0 /100 WBCS
PH UR STRIP.AUTO: 6 [PH]
PLATELET # BLD AUTO: 342 THOUSANDS/UL (ref 149–390)
PMV BLD AUTO: 9.2 FL (ref 8.9–12.7)
POTASSIUM SERPL-SCNC: 4.1 MMOL/L (ref 3.5–5.3)
PROT SERPL-MCNC: 7.1 G/DL (ref 6.4–8.4)
PROT UR STRIP-MCNC: NEGATIVE MG/DL
RBC # BLD AUTO: 5.03 MILLION/UL (ref 3.88–5.62)
RBC #/AREA URNS AUTO: NORMAL /HPF
SODIUM SERPL-SCNC: 139 MMOL/L (ref 135–147)
SP GR UR STRIP.AUTO: 1.02 (ref 1–1.03)
T4 FREE SERPL-MCNC: 0.85 NG/DL (ref 0.61–1.12)
TRIGL SERPL-MCNC: 53 MG/DL (ref ?–150)
TSH SERPL DL<=0.05 MIU/L-ACNC: 2.07 UIU/ML (ref 0.45–4.5)
URATE SERPL-MCNC: 5.7 MG/DL (ref 3.5–8.5)
UROBILINOGEN UR QL STRIP.AUTO: 1 E.U./DL
VIT B12 SERPL-MCNC: 256 PG/ML (ref 180–914)
WBC # BLD AUTO: 13.66 THOUSAND/UL (ref 4.31–10.16)
WBC #/AREA URNS AUTO: NORMAL /HPF

## 2024-11-29 PROCEDURE — 83735 ASSAY OF MAGNESIUM: CPT

## 2024-11-29 PROCEDURE — 80061 LIPID PANEL: CPT

## 2024-11-29 PROCEDURE — 84550 ASSAY OF BLOOD/URIC ACID: CPT

## 2024-11-29 PROCEDURE — 85025 COMPLETE CBC W/AUTO DIFF WBC: CPT

## 2024-11-29 PROCEDURE — 84443 ASSAY THYROID STIM HORMONE: CPT

## 2024-11-29 PROCEDURE — 80053 COMPREHEN METABOLIC PANEL: CPT

## 2024-11-29 PROCEDURE — 83036 HEMOGLOBIN GLYCOSYLATED A1C: CPT

## 2024-11-29 PROCEDURE — 84439 ASSAY OF FREE THYROXINE: CPT

## 2024-11-29 PROCEDURE — 82607 VITAMIN B-12: CPT

## 2024-11-29 PROCEDURE — 36415 COLL VENOUS BLD VENIPUNCTURE: CPT

## 2024-11-29 PROCEDURE — 82746 ASSAY OF FOLIC ACID SERUM: CPT

## 2024-11-29 PROCEDURE — 81001 URINALYSIS AUTO W/SCOPE: CPT

## 2024-12-16 ENCOUNTER — APPOINTMENT (OUTPATIENT)
Dept: LAB | Facility: HOSPITAL | Age: 74
End: 2024-12-16
Payer: MEDICARE

## 2024-12-16 DIAGNOSIS — D72.829 LEUKOCYTOSIS, UNSPECIFIED TYPE: ICD-10-CM

## 2024-12-16 LAB
BASOPHILS # BLD AUTO: 0.04 THOUSANDS/ÂΜL (ref 0–0.1)
BASOPHILS NFR BLD AUTO: 0 % (ref 0–1)
EOSINOPHIL # BLD AUTO: 0.19 THOUSAND/ÂΜL (ref 0–0.61)
EOSINOPHIL NFR BLD AUTO: 2 % (ref 0–6)
ERYTHROCYTE [DISTWIDTH] IN BLOOD BY AUTOMATED COUNT: 14.6 % (ref 11.6–15.1)
HCT VFR BLD AUTO: 45.1 % (ref 36.5–49.3)
HGB BLD-MCNC: 14.4 G/DL (ref 12–17)
IMM GRANULOCYTES # BLD AUTO: 0.05 THOUSAND/UL (ref 0–0.2)
IMM GRANULOCYTES NFR BLD AUTO: 1 % (ref 0–2)
LYMPHOCYTES # BLD AUTO: 1.38 THOUSANDS/ÂΜL (ref 0.6–4.47)
LYMPHOCYTES NFR BLD AUTO: 14 % (ref 14–44)
MCH RBC QN AUTO: 29 PG (ref 26.8–34.3)
MCHC RBC AUTO-ENTMCNC: 31.9 G/DL (ref 31.4–37.4)
MCV RBC AUTO: 91 FL (ref 82–98)
MONOCYTES # BLD AUTO: 1.03 THOUSAND/ÂΜL (ref 0.17–1.22)
MONOCYTES NFR BLD AUTO: 11 % (ref 4–12)
NEUTROPHILS # BLD AUTO: 7.14 THOUSANDS/ÂΜL (ref 1.85–7.62)
NEUTS SEG NFR BLD AUTO: 72 % (ref 43–75)
NRBC BLD AUTO-RTO: 0 /100 WBCS
PLATELET # BLD AUTO: 282 THOUSANDS/UL (ref 149–390)
PMV BLD AUTO: 9 FL (ref 8.9–12.7)
RBC # BLD AUTO: 4.96 MILLION/UL (ref 3.88–5.62)
WBC # BLD AUTO: 9.83 THOUSAND/UL (ref 4.31–10.16)

## 2024-12-16 PROCEDURE — 85025 COMPLETE CBC W/AUTO DIFF WBC: CPT

## 2024-12-16 PROCEDURE — 36415 COLL VENOUS BLD VENIPUNCTURE: CPT

## 2025-03-27 ENCOUNTER — APPOINTMENT (OUTPATIENT)
Dept: LAB | Facility: HOSPITAL | Age: 75
End: 2025-03-27
Payer: MEDICARE

## 2025-03-27 DIAGNOSIS — Z01.812 PRE-OPERATIVE LABORATORY EXAMINATION: ICD-10-CM

## 2025-03-27 DIAGNOSIS — I48.19 PERSISTENT ATRIAL FIBRILLATION (HCC): ICD-10-CM

## 2025-03-27 DIAGNOSIS — Z01.83 ENCOUNTER FOR BLOOD TYPING: ICD-10-CM

## 2025-03-27 LAB
ANION GAP SERPL CALCULATED.3IONS-SCNC: 6 MMOL/L (ref 4–13)
BASOPHILS # BLD AUTO: 0.06 THOUSANDS/ÂΜL (ref 0–0.1)
BASOPHILS NFR BLD AUTO: 1 % (ref 0–1)
BUN SERPL-MCNC: 23 MG/DL (ref 5–25)
CALCIUM SERPL-MCNC: 9.1 MG/DL (ref 8.4–10.2)
CHLORIDE SERPL-SCNC: 102 MMOL/L (ref 96–108)
CO2 SERPL-SCNC: 30 MMOL/L (ref 21–32)
CREAT SERPL-MCNC: 1.05 MG/DL (ref 0.6–1.3)
EOSINOPHIL # BLD AUTO: 0.26 THOUSAND/ÂΜL (ref 0–0.61)
EOSINOPHIL NFR BLD AUTO: 3 % (ref 0–6)
ERYTHROCYTE [DISTWIDTH] IN BLOOD BY AUTOMATED COUNT: 13.7 % (ref 11.6–15.1)
GFR SERPL CREATININE-BSD FRML MDRD: 69 ML/MIN/1.73SQ M
GLUCOSE P FAST SERPL-MCNC: 116 MG/DL (ref 65–99)
HCT VFR BLD AUTO: 45.6 % (ref 36.5–49.3)
HGB BLD-MCNC: 14.5 G/DL (ref 12–17)
IMM GRANULOCYTES # BLD AUTO: 0.04 THOUSAND/UL (ref 0–0.2)
IMM GRANULOCYTES NFR BLD AUTO: 0 % (ref 0–2)
LYMPHOCYTES # BLD AUTO: 1.41 THOUSANDS/ÂΜL (ref 0.6–4.47)
LYMPHOCYTES NFR BLD AUTO: 15 % (ref 14–44)
MAGNESIUM SERPL-MCNC: 2 MG/DL (ref 1.9–2.7)
MCH RBC QN AUTO: 30.2 PG (ref 26.8–34.3)
MCHC RBC AUTO-ENTMCNC: 31.8 G/DL (ref 31.4–37.4)
MCV RBC AUTO: 95 FL (ref 82–98)
MONOCYTES # BLD AUTO: 0.92 THOUSAND/ÂΜL (ref 0.17–1.22)
MONOCYTES NFR BLD AUTO: 10 % (ref 4–12)
NEUTROPHILS # BLD AUTO: 6.76 THOUSANDS/ÂΜL (ref 1.85–7.62)
NEUTS SEG NFR BLD AUTO: 71 % (ref 43–75)
NRBC BLD AUTO-RTO: 0 /100 WBCS
PLATELET # BLD AUTO: 295 THOUSANDS/UL (ref 149–390)
PMV BLD AUTO: 9.1 FL (ref 8.9–12.7)
POTASSIUM SERPL-SCNC: 4.3 MMOL/L (ref 3.5–5.3)
RBC # BLD AUTO: 4.8 MILLION/UL (ref 3.88–5.62)
SODIUM SERPL-SCNC: 138 MMOL/L (ref 135–147)
WBC # BLD AUTO: 9.45 THOUSAND/UL (ref 4.31–10.16)

## 2025-03-27 PROCEDURE — 85025 COMPLETE CBC W/AUTO DIFF WBC: CPT

## 2025-03-27 PROCEDURE — 80048 BASIC METABOLIC PNL TOTAL CA: CPT

## 2025-03-27 PROCEDURE — 83735 ASSAY OF MAGNESIUM: CPT

## 2025-03-27 PROCEDURE — 36415 COLL VENOUS BLD VENIPUNCTURE: CPT

## 2025-04-30 ENCOUNTER — APPOINTMENT (EMERGENCY)
Dept: RADIOLOGY | Facility: HOSPITAL | Age: 75
DRG: 871 | End: 2025-04-30
Payer: MEDICARE

## 2025-04-30 ENCOUNTER — HOSPITAL ENCOUNTER (INPATIENT)
Facility: HOSPITAL | Age: 75
LOS: 2 days | Discharge: HOME HEALTH | DRG: 871 | End: 2025-05-02
Attending: EMERGENCY MEDICINE | Admitting: FAMILY MEDICINE
Payer: MEDICARE

## 2025-04-30 ENCOUNTER — APPOINTMENT (INPATIENT)
Dept: CT IMAGING | Facility: HOSPITAL | Age: 75
DRG: 871 | End: 2025-04-30
Payer: MEDICARE

## 2025-04-30 DIAGNOSIS — J44.9 COPD (CHRONIC OBSTRUCTIVE PULMONARY DISEASE) (HCC): ICD-10-CM

## 2025-04-30 DIAGNOSIS — J44.0 CHRONIC OBSTRUCTIVE PULMONARY DISEASE WITH ACUTE LOWER RESPIRATORY INFECTION (HCC): ICD-10-CM

## 2025-04-30 DIAGNOSIS — A41.9 SEPSIS (HCC): ICD-10-CM

## 2025-04-30 DIAGNOSIS — R09.02 HYPOXIA: ICD-10-CM

## 2025-04-30 DIAGNOSIS — J18.9 PNEUMONIA: Primary | ICD-10-CM

## 2025-04-30 DIAGNOSIS — I95.9 HYPOTENSION: ICD-10-CM

## 2025-04-30 DIAGNOSIS — I50.9 CHF, ACUTE ON CHRONIC (HCC): ICD-10-CM

## 2025-04-30 PROBLEM — J96.01 ACUTE RESPIRATORY FAILURE WITH HYPOXIA (HCC): Status: ACTIVE | Noted: 2025-04-30

## 2025-04-30 PROBLEM — I25.84 CORONARY ATHEROSCLEROSIS DUE TO CALCIFIED CORONARY LESION: Status: ACTIVE | Noted: 2018-07-13

## 2025-04-30 PROBLEM — I25.10 ATHEROSCLEROTIC HEART DISEASE OF NATIVE CORONARY ARTERY WITHOUT ANGINA PECTORIS: Status: ACTIVE | Noted: 2023-03-24

## 2025-04-30 PROBLEM — I25.10 CORONARY ATHEROSCLEROSIS DUE TO CALCIFIED CORONARY LESION: Status: ACTIVE | Noted: 2018-07-13

## 2025-04-30 PROBLEM — Z87.09 HISTORY OF COPD: Status: ACTIVE | Noted: 2024-04-24

## 2025-04-30 PROBLEM — I38 HEART VALVE DISEASE: Status: ACTIVE | Noted: 2025-04-30

## 2025-04-30 PROBLEM — N17.9 AKI (ACUTE KIDNEY INJURY) (HCC): Status: ACTIVE | Noted: 2025-04-30

## 2025-04-30 PROBLEM — R65.21 SEPTIC SHOCK (HCC): Status: ACTIVE | Noted: 2024-05-16

## 2025-04-30 PROBLEM — E83.42 HYPOMAGNESEMIA: Status: ACTIVE | Noted: 2023-05-17

## 2025-04-30 PROBLEM — I26.99 PULMONARY EMBOLISM (HCC): Status: ACTIVE | Noted: 2019-01-09

## 2025-04-30 PROBLEM — R79.89 ELEVATED TROPONIN LEVEL: Status: ACTIVE | Noted: 2025-04-22

## 2025-04-30 PROBLEM — E11.9 DIABETES MELLITUS (HCC): Status: ACTIVE | Noted: 2021-03-10

## 2025-04-30 LAB
2HR DELTA HS TROPONIN: -4 NG/L
4HR DELTA HS TROPONIN: -3 NG/L
ALBUMIN SERPL BCG-MCNC: 3.3 G/DL (ref 3.5–5)
ALBUMIN SERPL BCG-MCNC: 3.6 G/DL (ref 3.5–5)
ALP SERPL-CCNC: 59 U/L (ref 34–104)
ALP SERPL-CCNC: 67 U/L (ref 34–104)
ALT SERPL W P-5'-P-CCNC: 27 U/L (ref 7–52)
ALT SERPL W P-5'-P-CCNC: 30 U/L (ref 7–52)
ANION GAP SERPL CALCULATED.3IONS-SCNC: 8 MMOL/L (ref 4–13)
ANION GAP SERPL CALCULATED.3IONS-SCNC: 8 MMOL/L (ref 4–13)
ANISOCYTOSIS BLD QL SMEAR: PRESENT
APTT PPP: 40 SECONDS (ref 23–34)
AST SERPL W P-5'-P-CCNC: 18 U/L (ref 13–39)
AST SERPL W P-5'-P-CCNC: 20 U/L (ref 13–39)
ATRIAL RATE: 64 BPM
BACTERIA UR QL AUTO: NORMAL /HPF
BASE EX.OXY STD BLDV CALC-SCNC: 70.8 % (ref 60–80)
BASE EX.OXY STD BLDV CALC-SCNC: 88.2 % (ref 60–80)
BASE EXCESS BLDV CALC-SCNC: 3.1 MMOL/L
BASE EXCESS BLDV CALC-SCNC: 3.9 MMOL/L
BASOPHILS # BLD AUTO: 0.09 THOUSANDS/ÂΜL (ref 0–0.1)
BASOPHILS NFR BLD AUTO: 0 % (ref 0–1)
BILIRUB SERPL-MCNC: 1.95 MG/DL (ref 0.2–1)
BILIRUB SERPL-MCNC: 2.03 MG/DL (ref 0.2–1)
BILIRUB UR QL STRIP: NEGATIVE
BNP SERPL-MCNC: 101 PG/ML (ref 0–100)
BUN SERPL-MCNC: 28 MG/DL (ref 5–25)
BUN SERPL-MCNC: 30 MG/DL (ref 5–25)
CALCIUM ALBUM COR SERPL-MCNC: 8.8 MG/DL (ref 8.3–10.1)
CALCIUM SERPL-MCNC: 8.2 MG/DL (ref 8.4–10.2)
CALCIUM SERPL-MCNC: 8.8 MG/DL (ref 8.4–10.2)
CARDIAC TROPONIN I PNL SERPL HS: 13 NG/L (ref ?–50)
CARDIAC TROPONIN I PNL SERPL HS: 14 NG/L (ref ?–50)
CARDIAC TROPONIN I PNL SERPL HS: 17 NG/L (ref ?–50)
CHLORIDE SERPL-SCNC: 100 MMOL/L (ref 96–108)
CHLORIDE SERPL-SCNC: 102 MMOL/L (ref 96–108)
CLARITY UR: CLEAR
CO2 SERPL-SCNC: 27 MMOL/L (ref 21–32)
CO2 SERPL-SCNC: 28 MMOL/L (ref 21–32)
COLOR UR: ABNORMAL
CREAT SERPL-MCNC: 1.4 MG/DL (ref 0.6–1.3)
CREAT SERPL-MCNC: 1.53 MG/DL (ref 0.6–1.3)
CRP SERPL QL: 39.1 MG/L
EOSINOPHIL # BLD AUTO: 0.02 THOUSAND/ÂΜL (ref 0–0.61)
EOSINOPHIL NFR BLD AUTO: 0 % (ref 0–6)
ERYTHROCYTE [DISTWIDTH] IN BLOOD BY AUTOMATED COUNT: 13.8 % (ref 11.6–15.1)
ERYTHROCYTE [SEDIMENTATION RATE] IN BLOOD: 36 MM/HOUR (ref 0–19)
FLUAV AG UPPER RESP QL IA.RAPID: NEGATIVE
FLUBV AG UPPER RESP QL IA.RAPID: NEGATIVE
GFR SERPL CREATININE-BSD FRML MDRD: 43 ML/MIN/1.73SQ M
GFR SERPL CREATININE-BSD FRML MDRD: 48 ML/MIN/1.73SQ M
GLUCOSE SERPL-MCNC: 167 MG/DL (ref 65–140)
GLUCOSE SERPL-MCNC: 169 MG/DL (ref 65–140)
GLUCOSE UR STRIP-MCNC: ABNORMAL MG/DL
HCO3 BLDV-SCNC: 27.2 MMOL/L (ref 24–30)
HCO3 BLDV-SCNC: 28.9 MMOL/L (ref 24–30)
HCT VFR BLD AUTO: 40.6 % (ref 36.5–49.3)
HGB BLD-MCNC: 13.2 G/DL (ref 12–17)
HGB UR QL STRIP.AUTO: ABNORMAL
IMM GRANULOCYTES # BLD AUTO: 0.33 THOUSAND/UL (ref 0–0.2)
IMM GRANULOCYTES NFR BLD AUTO: 1 % (ref 0–2)
INR PPP: 3.35 (ref 0.85–1.19)
KETONES UR STRIP-MCNC: NEGATIVE MG/DL
L PNEUMO1 AG UR QL IA.RAPID: NEGATIVE
LACTATE SERPL-SCNC: 1.6 MMOL/L (ref 0.5–2)
LEUKOCYTE ESTERASE UR QL STRIP: ABNORMAL
LIPASE SERPL-CCNC: 16 U/L (ref 11–82)
LYMPHOCYTES # BLD AUTO: 0.79 THOUSANDS/ÂΜL (ref 0.6–4.47)
LYMPHOCYTES NFR BLD AUTO: 3 % (ref 14–44)
MAGNESIUM SERPL-MCNC: 2.3 MG/DL (ref 1.9–2.7)
MCH RBC QN AUTO: 29.7 PG (ref 26.8–34.3)
MCHC RBC AUTO-ENTMCNC: 32.5 G/DL (ref 31.4–37.4)
MCV RBC AUTO: 91 FL (ref 82–98)
MICROCYTES BLD QL AUTO: PRESENT
MONOCYTES # BLD AUTO: 1.27 THOUSAND/ÂΜL (ref 0.17–1.22)
MONOCYTES NFR BLD AUTO: 4 % (ref 4–12)
NEUTROPHILS # BLD AUTO: 27.03 THOUSANDS/ÂΜL (ref 1.85–7.62)
NEUTS SEG NFR BLD AUTO: 92 % (ref 43–75)
NITRITE UR QL STRIP: NEGATIVE
NON-SQ EPI CELLS URNS QL MICRO: NORMAL /HPF
NRBC BLD AUTO-RTO: 0 /100 WBCS
O2 CT BLDV-SCNC: 14.2 ML/DL
O2 CT BLDV-SCNC: 16.6 ML/DL
P AXIS: 26 DEGREES
PCO2 BLDV: 39.8 MM HG (ref 42–50)
PCO2 BLDV: 44.8 MM HG (ref 42–50)
PH BLDV: 7.43 [PH] (ref 7.3–7.4)
PH BLDV: 7.45 [PH] (ref 7.3–7.4)
PH UR STRIP.AUTO: 6 [PH]
PHOSPHATE SERPL-MCNC: 2.9 MG/DL (ref 2.3–4.1)
PLATELET # BLD AUTO: 483 THOUSANDS/UL (ref 149–390)
PLATELET BLD QL SMEAR: ADEQUATE
PMV BLD AUTO: 8.9 FL (ref 8.9–12.7)
PO2 BLDV: 40.2 MM HG (ref 35–45)
PO2 BLDV: 61.7 MM HG (ref 35–45)
POTASSIUM SERPL-SCNC: 4 MMOL/L (ref 3.5–5.3)
POTASSIUM SERPL-SCNC: 4.1 MMOL/L (ref 3.5–5.3)
PR INTERVAL: 202 MS
PROCALCITONIN SERPL-MCNC: 0.24 NG/ML
PROCALCITONIN SERPL-MCNC: 0.29 NG/ML
PROT SERPL-MCNC: 6.3 G/DL (ref 6.4–8.4)
PROT SERPL-MCNC: 6.9 G/DL (ref 6.4–8.4)
PROT UR STRIP-MCNC: NEGATIVE MG/DL
PROTHROMBIN TIME: 33.7 SECONDS (ref 12.3–15)
QRS AXIS: -5 DEGREES
QRSD INTERVAL: 90 MS
QT INTERVAL: 428 MS
QTC INTERVAL: 441 MS
RBC # BLD AUTO: 4.44 MILLION/UL (ref 3.88–5.62)
RBC #/AREA URNS AUTO: NORMAL /HPF
RBC MORPH BLD: PRESENT
S PNEUM AG UR QL: NEGATIVE
SARS-COV+SARS-COV-2 AG RESP QL IA.RAPID: NEGATIVE
SODIUM SERPL-SCNC: 136 MMOL/L (ref 135–147)
SODIUM SERPL-SCNC: 137 MMOL/L (ref 135–147)
SP GR UR STRIP.AUTO: 1.01 (ref 1–1.03)
T WAVE AXIS: 15 DEGREES
UROBILINOGEN UR QL STRIP.AUTO: 0.2 E.U./DL
VENTRICULAR RATE: 64 BPM
WBC # BLD AUTO: 29.53 THOUSAND/UL (ref 4.31–10.16)
WBC #/AREA URNS AUTO: NORMAL /HPF

## 2025-04-30 PROCEDURE — 86140 C-REACTIVE PROTEIN: CPT | Performed by: EMERGENCY MEDICINE

## 2025-04-30 PROCEDURE — 82805 BLOOD GASES W/O2 SATURATION: CPT | Performed by: EMERGENCY MEDICINE

## 2025-04-30 PROCEDURE — 83605 ASSAY OF LACTIC ACID: CPT | Performed by: EMERGENCY MEDICINE

## 2025-04-30 PROCEDURE — 85610 PROTHROMBIN TIME: CPT | Performed by: EMERGENCY MEDICINE

## 2025-04-30 PROCEDURE — 80053 COMPREHEN METABOLIC PANEL: CPT | Performed by: EMERGENCY MEDICINE

## 2025-04-30 PROCEDURE — 87804 INFLUENZA ASSAY W/OPTIC: CPT | Performed by: EMERGENCY MEDICINE

## 2025-04-30 PROCEDURE — 85730 THROMBOPLASTIN TIME PARTIAL: CPT | Performed by: EMERGENCY MEDICINE

## 2025-04-30 PROCEDURE — 96374 THER/PROPH/DIAG INJ IV PUSH: CPT

## 2025-04-30 PROCEDURE — 85652 RBC SED RATE AUTOMATED: CPT | Performed by: EMERGENCY MEDICINE

## 2025-04-30 PROCEDURE — 87449 NOS EACH ORGANISM AG IA: CPT

## 2025-04-30 PROCEDURE — 82805 BLOOD GASES W/O2 SATURATION: CPT

## 2025-04-30 PROCEDURE — 94760 N-INVAS EAR/PLS OXIMETRY 1: CPT

## 2025-04-30 PROCEDURE — 87040 BLOOD CULTURE FOR BACTERIA: CPT | Performed by: EMERGENCY MEDICINE

## 2025-04-30 PROCEDURE — 81001 URINALYSIS AUTO W/SCOPE: CPT | Performed by: EMERGENCY MEDICINE

## 2025-04-30 PROCEDURE — 71250 CT THORAX DX C-: CPT

## 2025-04-30 PROCEDURE — 84145 PROCALCITONIN (PCT): CPT

## 2025-04-30 PROCEDURE — 83690 ASSAY OF LIPASE: CPT

## 2025-04-30 PROCEDURE — 99291 CRITICAL CARE FIRST HOUR: CPT | Performed by: EMERGENCY MEDICINE

## 2025-04-30 PROCEDURE — 85025 COMPLETE CBC W/AUTO DIFF WBC: CPT | Performed by: EMERGENCY MEDICINE

## 2025-04-30 PROCEDURE — 99285 EMERGENCY DEPT VISIT HI MDM: CPT

## 2025-04-30 PROCEDURE — 87081 CULTURE SCREEN ONLY: CPT

## 2025-04-30 PROCEDURE — 71045 X-RAY EXAM CHEST 1 VIEW: CPT

## 2025-04-30 PROCEDURE — 94640 AIRWAY INHALATION TREATMENT: CPT

## 2025-04-30 PROCEDURE — 96375 TX/PRO/DX INJ NEW DRUG ADDON: CPT

## 2025-04-30 PROCEDURE — 80053 COMPREHEN METABOLIC PANEL: CPT

## 2025-04-30 PROCEDURE — 84484 ASSAY OF TROPONIN QUANT: CPT | Performed by: EMERGENCY MEDICINE

## 2025-04-30 PROCEDURE — 83735 ASSAY OF MAGNESIUM: CPT | Performed by: EMERGENCY MEDICINE

## 2025-04-30 PROCEDURE — 36415 COLL VENOUS BLD VENIPUNCTURE: CPT | Performed by: EMERGENCY MEDICINE

## 2025-04-30 PROCEDURE — 94664 DEMO&/EVAL PT USE INHALER: CPT

## 2025-04-30 PROCEDURE — 99222 1ST HOSP IP/OBS MODERATE 55: CPT | Performed by: FAMILY MEDICINE

## 2025-04-30 PROCEDURE — 93005 ELECTROCARDIOGRAM TRACING: CPT

## 2025-04-30 PROCEDURE — 84100 ASSAY OF PHOSPHORUS: CPT

## 2025-04-30 PROCEDURE — 87811 SARS-COV-2 COVID19 W/OPTIC: CPT | Performed by: EMERGENCY MEDICINE

## 2025-04-30 PROCEDURE — 84145 PROCALCITONIN (PCT): CPT | Performed by: EMERGENCY MEDICINE

## 2025-04-30 PROCEDURE — 83880 ASSAY OF NATRIURETIC PEPTIDE: CPT | Performed by: EMERGENCY MEDICINE

## 2025-04-30 RX ORDER — CEFEPIME HYDROCHLORIDE 2 G/50ML
2000 INJECTION, SOLUTION INTRAVENOUS ONCE
Status: COMPLETED | OUTPATIENT
Start: 2025-04-30 | End: 2025-04-30

## 2025-04-30 RX ORDER — DEXAMETHASONE SODIUM PHOSPHATE 10 MG/ML
10 INJECTION, SOLUTION INTRAMUSCULAR; INTRAVENOUS ONCE
Status: COMPLETED | OUTPATIENT
Start: 2025-04-30 | End: 2025-04-30

## 2025-04-30 RX ORDER — LEVALBUTEROL INHALATION SOLUTION 1.25 MG/3ML
1.25 SOLUTION RESPIRATORY (INHALATION) EVERY 6 HOURS PRN
Status: DISCONTINUED | OUTPATIENT
Start: 2025-04-30 | End: 2025-04-30

## 2025-04-30 RX ORDER — LEVOFLOXACIN 5 MG/ML
750 INJECTION, SOLUTION INTRAVENOUS EVERY 24 HOURS
Status: DISCONTINUED | OUTPATIENT
Start: 2025-04-30 | End: 2025-04-30

## 2025-04-30 RX ORDER — IPRATROPIUM BROMIDE AND ALBUTEROL SULFATE 2.5; .5 MG/3ML; MG/3ML
3 SOLUTION RESPIRATORY (INHALATION) ONCE
Status: COMPLETED | OUTPATIENT
Start: 2025-04-30 | End: 2025-04-30

## 2025-04-30 RX ORDER — PANTOPRAZOLE SODIUM 40 MG/1
40 TABLET, DELAYED RELEASE ORAL
Status: DISCONTINUED | OUTPATIENT
Start: 2025-04-30 | End: 2025-05-02 | Stop reason: HOSPADM

## 2025-04-30 RX ORDER — AMIODARONE HYDROCHLORIDE 200 MG/1
200 TABLET ORAL DAILY
Status: DISCONTINUED | OUTPATIENT
Start: 2025-04-30 | End: 2025-05-02 | Stop reason: HOSPADM

## 2025-04-30 RX ORDER — SODIUM CHLORIDE FOR INHALATION 0.9 %
3 VIAL, NEBULIZER (ML) INHALATION EVERY 6 HOURS PRN
Status: DISCONTINUED | OUTPATIENT
Start: 2025-04-30 | End: 2025-04-30

## 2025-04-30 RX ORDER — FERROUS SULFATE 325(65) MG
325 TABLET ORAL
Status: DISCONTINUED | OUTPATIENT
Start: 2025-04-30 | End: 2025-05-02 | Stop reason: HOSPADM

## 2025-04-30 RX ORDER — CEFTRIAXONE 1 G/50ML
1000 INJECTION, SOLUTION INTRAVENOUS EVERY 24 HOURS
Status: DISCONTINUED | OUTPATIENT
Start: 2025-05-01 | End: 2025-05-02 | Stop reason: HOSPADM

## 2025-04-30 RX ORDER — FERROUS SULFATE 325(65) MG
325 TABLET ORAL
COMMUNITY

## 2025-04-30 RX ORDER — BENZONATATE 100 MG/1
100 CAPSULE ORAL 3 TIMES DAILY PRN
Status: DISCONTINUED | OUTPATIENT
Start: 2025-04-30 | End: 2025-05-02 | Stop reason: HOSPADM

## 2025-04-30 RX ORDER — LANOLIN ALCOHOL/MO/W.PET/CERES
400 CREAM (GRAM) TOPICAL 3 TIMES DAILY
Status: DISCONTINUED | OUTPATIENT
Start: 2025-04-30 | End: 2025-05-02 | Stop reason: HOSPADM

## 2025-04-30 RX ORDER — CALCIUM CARBONATE 500 MG/1
1000 TABLET, CHEWABLE ORAL DAILY PRN
Status: DISCONTINUED | OUTPATIENT
Start: 2025-04-30 | End: 2025-05-02 | Stop reason: HOSPADM

## 2025-04-30 RX ORDER — ALBUTEROL SULFATE 0.83 MG/ML
2.5 SOLUTION RESPIRATORY (INHALATION) EVERY 6 HOURS PRN
Status: DISCONTINUED | OUTPATIENT
Start: 2025-04-30 | End: 2025-05-02 | Stop reason: HOSPADM

## 2025-04-30 RX ORDER — ATORVASTATIN CALCIUM 20 MG/1
20 TABLET, FILM COATED ORAL
Status: DISCONTINUED | OUTPATIENT
Start: 2025-04-30 | End: 2025-05-02 | Stop reason: HOSPADM

## 2025-04-30 RX ORDER — BUDESONIDE 0.5 MG/2ML
0.5 INHALANT ORAL
Status: DISCONTINUED | OUTPATIENT
Start: 2025-04-30 | End: 2025-05-02 | Stop reason: HOSPADM

## 2025-04-30 RX ORDER — IPRATROPIUM BROMIDE AND ALBUTEROL SULFATE 2.5; .5 MG/3ML; MG/3ML
3 SOLUTION RESPIRATORY (INHALATION)
Status: DISCONTINUED | OUTPATIENT
Start: 2025-04-30 | End: 2025-04-30

## 2025-04-30 RX ORDER — AMIODARONE HYDROCHLORIDE 200 MG/1
200 TABLET ORAL DAILY
COMMUNITY

## 2025-04-30 RX ORDER — SODIUM CHLORIDE 9 MG/ML
50 INJECTION, SOLUTION INTRAVENOUS CONTINUOUS
Status: DISCONTINUED | OUTPATIENT
Start: 2025-04-30 | End: 2025-04-30

## 2025-04-30 RX ORDER — CEFEPIME HYDROCHLORIDE 2 G/50ML
2000 INJECTION, SOLUTION INTRAVENOUS EVERY 12 HOURS
Status: DISCONTINUED | OUTPATIENT
Start: 2025-04-30 | End: 2025-04-30

## 2025-04-30 RX ORDER — METHYLPREDNISOLONE SODIUM SUCCINATE 40 MG/ML
40 INJECTION, POWDER, LYOPHILIZED, FOR SOLUTION INTRAMUSCULAR; INTRAVENOUS EVERY 8 HOURS
Status: DISCONTINUED | OUTPATIENT
Start: 2025-04-30 | End: 2025-04-30

## 2025-04-30 RX ORDER — ONDANSETRON 2 MG/ML
4 INJECTION INTRAMUSCULAR; INTRAVENOUS EVERY 6 HOURS PRN
Status: DISCONTINUED | OUTPATIENT
Start: 2025-04-30 | End: 2025-05-02 | Stop reason: HOSPADM

## 2025-04-30 RX ORDER — IPRATROPIUM BROMIDE AND ALBUTEROL SULFATE 2.5; .5 MG/3ML; MG/3ML
3 SOLUTION RESPIRATORY (INHALATION)
Status: DISCONTINUED | OUTPATIENT
Start: 2025-05-01 | End: 2025-05-02 | Stop reason: HOSPADM

## 2025-04-30 RX ORDER — ACETAMINOPHEN 325 MG/1
650 TABLET ORAL EVERY 6 HOURS PRN
Status: DISCONTINUED | OUTPATIENT
Start: 2025-04-30 | End: 2025-05-02 | Stop reason: HOSPADM

## 2025-04-30 RX ORDER — OMEPRAZOLE 40 MG/1
40 CAPSULE, DELAYED RELEASE ORAL DAILY
COMMUNITY

## 2025-04-30 RX ORDER — ASPIRIN 81 MG/1
81 TABLET, CHEWABLE ORAL DAILY
Status: DISCONTINUED | OUTPATIENT
Start: 2025-04-30 | End: 2025-05-02 | Stop reason: HOSPADM

## 2025-04-30 RX ADMIN — AZITHROMYCIN MONOHYDRATE 500 MG: 500 INJECTION, POWDER, LYOPHILIZED, FOR SOLUTION INTRAVENOUS at 09:06

## 2025-04-30 RX ADMIN — VANCOMYCIN HYDROCHLORIDE 1500 MG: 5 INJECTION, POWDER, LYOPHILIZED, FOR SOLUTION INTRAVENOUS at 05:13

## 2025-04-30 RX ADMIN — BUDESONIDE INHALATION 0.5 MG: 0.5 SUSPENSION RESPIRATORY (INHALATION) at 20:04

## 2025-04-30 RX ADMIN — FERROUS SULFATE TAB 325 MG (65 MG ELEMENTAL FE) 325 MG: 325 (65 FE) TAB at 08:20

## 2025-04-30 RX ADMIN — Medication 400 MG: at 20:23

## 2025-04-30 RX ADMIN — IPRATROPIUM BROMIDE AND ALBUTEROL SULFATE 3 ML: 2.5; .5 SOLUTION RESPIRATORY (INHALATION) at 20:04

## 2025-04-30 RX ADMIN — BUDESONIDE INHALATION 0.5 MG: 0.5 SUSPENSION RESPIRATORY (INHALATION) at 09:19

## 2025-04-30 RX ADMIN — Medication 400 MG: at 17:26

## 2025-04-30 RX ADMIN — CEFEPIME HYDROCHLORIDE 2000 MG: 2 INJECTION, SOLUTION INTRAVENOUS at 04:56

## 2025-04-30 RX ADMIN — IPRATROPIUM BROMIDE AND ALBUTEROL SULFATE 3 ML: 2.5; .5 SOLUTION RESPIRATORY (INHALATION) at 04:29

## 2025-04-30 RX ADMIN — IPRATROPIUM BROMIDE AND ALBUTEROL SULFATE 3 ML: 2.5; .5 SOLUTION RESPIRATORY (INHALATION) at 14:01

## 2025-04-30 RX ADMIN — IPRATROPIUM BROMIDE AND ALBUTEROL SULFATE 3 ML: 2.5; .5 SOLUTION RESPIRATORY (INHALATION) at 09:19

## 2025-04-30 RX ADMIN — METHYLPREDNISOLONE SODIUM SUCCINATE 40 MG: 40 INJECTION, POWDER, FOR SOLUTION INTRAMUSCULAR; INTRAVENOUS at 09:06

## 2025-04-30 RX ADMIN — SODIUM CHLORIDE 50 ML/HR: 0.9 INJECTION, SOLUTION INTRAVENOUS at 06:51

## 2025-04-30 RX ADMIN — AMIODARONE HYDROCHLORIDE 200 MG: 200 TABLET ORAL at 08:20

## 2025-04-30 RX ADMIN — ASPIRIN 81 MG CHEWABLE TABLET 81 MG: 81 TABLET CHEWABLE at 08:20

## 2025-04-30 RX ADMIN — ATORVASTATIN CALCIUM 20 MG: 20 TABLET, FILM COATED ORAL at 17:26

## 2025-04-30 RX ADMIN — PANTOPRAZOLE SODIUM 40 MG: 40 TABLET, DELAYED RELEASE ORAL at 08:20

## 2025-04-30 RX ADMIN — Medication 400 MG: at 08:20

## 2025-04-30 RX ADMIN — RIVAROXABAN 20 MG: 20 TABLET, FILM COATED ORAL at 17:26

## 2025-04-30 RX ADMIN — SODIUM CHLORIDE 500 ML: 0.9 INJECTION, SOLUTION INTRAVENOUS at 04:30

## 2025-04-30 RX ADMIN — DEXAMETHASONE SODIUM PHOSPHATE 10 MG: 10 INJECTION, SOLUTION INTRAMUSCULAR; INTRAVENOUS at 04:56

## 2025-04-30 NOTE — ASSESSMENT & PLAN NOTE
"Shock now resolved  On presentation (4/30) met sepsis criteria with tachycardia, tachypnea, leukocytosis-hypotension refractory to volume  Chest CT (4/30): \"New bilateral multi lobar infiltrates, right greater than left attributed to pneumonia. Advise noncontrast chest CT follow-up in 8 weeks after appropriate medical therapy. Pulmonary emphysema. Trace right basilar pleural effusion.\"  Chest x-ray showed bilateral peripheral pneumonia  COVID/flu negative    Index suspicion for pulmonary source-multifocal pneumonia-initially received cefepime, vancomycin, azithromycin  Given improvement (4/30) de-escalated to Rocephin Zithromax  Urine Legionella and strep antigens negative  Sputum culture if able    "

## 2025-04-30 NOTE — PLAN OF CARE
Problem: Potential for Falls  Goal: Patient will remain free of falls  Description: INTERVENTIONS:- Educate patient/family on patient safety including physical limitations- Instruct patient to call for assistance with activity - Consult OT/PT to assist with strengthening/mobility - Keep Call bell within reach- Keep bed low and locked with side rails adjusted as appropriate- Keep care items and personal belongings within reach- Initiate and maintain comfort rounds- Make Fall Risk Sign visible to staff- Offer Toileting every 2 Hours, in advance of need- Initiate/Maintain bed/ chair alarm- Obtain necessary fall risk management equipment: cane/ walker - Apply yellow socks and bracelet for high fall risk patients- Consider moving patient to room near nurses station  INTERVENTIONS:- Educate patient/family on patient safety including physical limitations- Instruct patient to call for assistance with activity - Consult OT/PT to assist with strengthening/mobility - Keep Call bell within reach- Keep bed low and locked with side rails adjusted as appropriate- Keep care items and personal belongings within reach- Initiate and maintain comfort rounds- Make Fall Risk Sign visible to staff- Offer Toileting every 2 Hours, in advance of need- Initiate/Maintain bed/ chair alarm- Obtain necessary fall risk management equipment: walker- Apply yellow socks and bracelet for high fall risk patients- Consider moving patient to room near nurses station  Outcome: Progressing     Problem: PAIN - ADULT  Goal: Verbalizes/displays adequate comfort level or baseline comfort level  Description: Interventions:- Encourage patient to monitor pain and request assistance- Assess pain using appropriate pain scale- Administer analgesics based on type and severity of pain and evaluate response- Implement non-pharmacological measures as appropriate and evaluate response- Consider cultural and social influences on pain and pain management- Notify  physician/advanced practitioner if interventions unsuccessful or patient reports new pain  Outcome: Progressing     Problem: INFECTION - ADULT  Goal: Absence or prevention of progression during hospitalization  Description: INTERVENTIONS:- Assess and monitor for signs and symptoms of infection- Monitor lab/diagnostic results- Monitor all insertion sites, i.e. indwelling lines, tubes, and drains- Monitor endotracheal if appropriate and nasal secretions for changes in amount and color- [place] appropriate cooling/warming therapies per order- Administer medications as ordered- Instruct and encourage patient and family to use good hand hygiene technique- Identify and instruct in appropriate isolation precautions for identified infection/condition  Outcome: Progressing  Goal: Absence of fever/infection during neutropenic period  Description: INTERVENTIONS:- Monitor WBC  Outcome: Progressing     Problem: SAFETY ADULT  Goal: Patient will remain free of falls  Description: INTERVENTIONS:- Educate patient/family on patient safety including physical limitations- Instruct patient to call for assistance with activity - Consult OT/PT to assist with strengthening/mobility - Keep Call bell within reach- Keep bed low and locked with side rails adjusted as appropriate- Keep care items and personal belongings within reach- Initiate and maintain comfort rounds- Make Fall Risk Sign visible to staff- Offer Toileting every 2 Hours, in advance of need- Initiate/Maintain bed/ chair alarm- Obtain necessary fall risk management equipment: cane/ walker - Apply yellow socks and bracelet for high fall risk patients- Consider moving patient to room near nurses station  INTERVENTIONS:- Educate patient/family on patient safety including physical limitations- Instruct patient to call for assistance with activity - Consult OT/PT to assist with strengthening/mobility - Keep Call bell within reach- Keep bed low and locked with side rails adjusted as  appropriate- Keep care items and personal belongings within reach- Initiate and maintain comfort rounds- Make Fall Risk Sign visible to staff- Offer Toileting every 2 Hours, in advance of need- Initiate/Maintain bed/ chair alarm- Obtain necessary fall risk management equipment: walker- Apply yellow socks and bracelet for high fall risk patients- Consider moving patient to room near nurses station  Outcome: Progressing  Goal: Maintain or return to baseline ADL function  Description: INTERVENTIONS:-  Assess patient's ability to carry out ADLs; assess patient's baseline for ADL function and identify physical deficits which impact ability to perform ADLs (bathing, care of mouth/teeth, toileting, grooming, dressing, etc.)- Assess/evaluate cause of self-care deficits - Assess range of motion- Assess patient's mobility; develop plan if impaired- Assess patient's need for assistive devices and provide as appropriate- Encourage maximum independence but intervene and supervise when necessary- Involve family in performance of ADLs- Assess for home care needs following discharge - Consider OT consult to assist with ADL evaluation and planning for discharge- Provide patient education as appropriate  Outcome: Progressing  Goal: Maintains/Returns to pre admission functional level  Description: INTERVENTIONS:- Perform AM-PAC 6 Click Basic Mobility/ Daily Activity assessment daily.- Set and communicate daily mobility goal to care team and patient/family/caregiver. - Collaborate with rehabilitation services on mobility goals if consulted- Perform Range of Motion 2 times a day.- Reposition patient every 2 hours.- Dangle patient 2 times a day- Stand patient 2 times a day- Ambulate patient 2 times a day- Out of bed to chair 2 times a day - Out of bed for meals 2 times a day- Out of bed for toileting- Record patient progress and toleration of activity level   Outcome: Progressing     Problem: DISCHARGE PLANNING  Goal: Discharge to home  or other facility with appropriate resources  Description: INTERVENTIONS:- Identify barriers to discharge w/patient and caregiver- Arrange for needed discharge resources and transportation as appropriate- Identify discharge learning needs (meds, wound care, etc.)- Arrange for interpretive services to assist at discharge as needed- Refer to Case Management Department for coordinating discharge planning if the patient needs post-hospital services based on physician/advanced practitioner order or complex needs related to functional status, cognitive ability, or social support system  Outcome: Progressing     Problem: Knowledge Deficit  Goal: Patient/family/caregiver demonstrates understanding of disease process, treatment plan, medications, and discharge instructions  Description: Complete learning assessment and assess knowledge base.Interventions:- Provide teaching at level of understanding- Provide teaching via preferred learning methods  Outcome: Progressing     Problem: RESPIRATORY - ADULT  Goal: Achieves optimal ventilation and oxygenation  Description: INTERVENTIONS:- Assess for changes in respiratory status- Assess for changes in mentation and behavior- Position to facilitate oxygenation and minimize respiratory effort- Oxygen administered by appropriate delivery if ordered- Initiate smoking cessation education as indicated- Encourage broncho-pulmonary hygiene including cough, deep breathe, Incentive Spirometry- Assess the need for suctioning and aspirate as needed- Assess and instruct to report SOB or any respiratory difficulty- Respiratory Therapy support as indicated  Outcome: Progressing     Problem: METABOLIC, FLUID AND ELECTROLYTES - ADULT  Goal: Electrolytes maintained within normal limits  Description: INTERVENTIONS:- Monitor labs and assess patient for signs and symptoms of electrolyte imbalances- Administer electrolyte replacement as ordered- Monitor response to electrolyte replacements, including  repeat lab results as appropriate- Instruct patient on fluid and nutrition as appropriate  Outcome: Progressing  Goal: Fluid balance maintained  Description: INTERVENTIONS:- Monitor labs - Monitor I/O and WT- Instruct patient on fluid and nutrition as appropriate- Assess for signs & symptoms of volume excess or deficit  Outcome: Progressing  Goal: Glucose maintained within target range  Description: INTERVENTIONS:- Monitor Blood Glucose as ordered- Assess for signs and symptoms of hyperglycemia and hypoglycemia- Administer ordered medications to maintain glucose within target range- Assess nutritional intake and initiate nutrition service referral as needed  Outcome: Progressing

## 2025-04-30 NOTE — ASSESSMENT & PLAN NOTE
Lab Results   Component Value Date    CREATININE 1.40 (H) 04/30/2025    CREATININE 1.53 (H) 04/30/2025    CREATININE 1.05 03/27/2025    CREATININE 0.91 11/29/2024   Creatinine on admission noted to be 1.53  Baseline creatinine 0.8-1.0  Avoid nephrotoxic medications and hypotension  Received 500 mL NS bolus in the ED, continue NS IVF at 50 mL/hr

## 2025-04-30 NOTE — H&P
"H&P - Hospitalist   Name: Manjit Hernandez 74 y.o. male I MRN: [id number]  Unit/Bed#: -01 I Date of Admission: 4/30/2025   Date of Service: 4/30/2025 I Hospital Day: 0     Assessment & Plan  Septic shock (HCC)  Met sepsis criteria with RR 23, WBC 29.53, pneumonia and BP of 81/50  Chest CT: \"New bilateral multi lobar infiltrates, right greater than left attributed to pneumonia. Advise noncontrast chest CT follow-up in 8 weeks after appropriate medical therapy. Pulmonary emphysema. Trace right basilar pleural effusion.\"  Chest x-ray showed bilateral peripheral pneumonia and pulmonary venous congestion/edema.  COVID/flu negative  No fever, lactic acid normal at 1.6, procalcitonin initially normal at 0.24  WBC elevated at 29.53, trend  Repeat procalcitonin elevated at 0.29, trend  C-reactive protein elevated at 39.1, trend  Sed rate elevated at 36, trend  UA: Trace blood, elevated leukocytes  Blood cultures and MRSA pending  Received 500 mL NS bolus in the ED, continue NS IVF at 50 mL/hr  Received cefepime 2000 mg IV and vancomycin 1500 mg IV in the ED, continue cefepime 2000 mg IV  Monitor fever curve, vital signs, and symptoms  CAP (community acquired pneumonia)  Patient presented with dizziness, lightheadedness, and shortness of breath that started 4/29 PM.  As above  Urine strep pneumonia Legionella pending  Received cefepime 2000 mg IV and vancomycin 1500 mg IV in the ED, continue cefepime 2000 mg IV  Hypotension  Patient presented with dizziness, lightheadedness, and shortness of breath that started 4/29 PM. He was recently hospitalized for CHF at [facility]. Likely hypotension due to aggressive diuresis after an ablation and continued use of Lasix at home - patient reports daily 1 lb a day weight loss since his discharge.   BP in the ED of 81/50 and hypoxia 83% on RA   Received 500 mL NS bolus and BP improved to 107/53.   Will hold home Lasix and bisoprolol due to hypotension secondary to over " diuresis  Received 500 mL NS bolus in the ED, continue NS IVF at 50 mL/hr  Monitor BP  LEELEE (acute kidney injury) ([facility])  Lab Results   Component Value Date    CREATININE 1.40 (H) 04/30/2025    CREATININE 1.53 (H) 04/30/2025    CREATININE 1.05 03/27/2025    CREATININE 0.91 11/29/2024   Creatinine on admission noted to be 1.53  Baseline creatinine 0.8-1.0  Avoid nephrotoxic medications and hypotension  Received 500 mL NS bolus in the ED, continue NS IVF at 50 mL/hr  Acute respiratory failure with hypoxia (HCC)  O2 saturation in the ED 83% on RA, placed on 5 L NC and at 89%  Oxygen titrated to 6 L NC at 92 to 96%  Initial VBG showed metabolic alkalosis*9  Repeat VBG showed respiratory alkalosis  Received DuoNeb in the ED, continue  Continue home nebulizer treatments  Patient denies any home oxygen use, wean as able  Essential (primary) hypertension  BP in the ED 81/50, current /51  Home medication: Lasix and bisoprolol  Will hold Lasix and bisoprolol due to hypotension secondary to over diuresis  Monitor BP  Chronic obstructive pulmonary disease with acute lower respiratory infection (HCC)  Noted history of COPD  Continue nebulized bronchodilators  PAF (paroxysmal atrial fibrillation) (HCC)  Continue home Xarelto and bisoprolol  Rate controlled  EKG on admission normal sinus rhythm rate of 64  Chronic diastolic congestive heart failure (HCC)  Wt Readings from Last 3 Encounters:   04/30/25 135 kg (296 lb 11.8 oz)   05/15/24 (!) 138 kg (303 lb 5.7 oz)   10/30/23 (!) 148 kg (327 lb)   History of CHF with preserved ejection fraction  Recently underwent ablation at [facility].  Echo 10/2023: The left ventricular ejection fraction is 45-50% by visual estimation. Systolic function is mildly reduced.   Hold home Lasix due to LEELEE and hypotension  Monitor volume status  Daily weights and strict I's and O's  Troponin: 17 -> 13  BNP elevated at 101  History of pulmonary embolism  Noted history of PE s/p lung mass  resection    VTE Pharmacologic Prophylaxis: VTE Score: 14 High Risk (Score >/= 5) - Pharmacological DVT Prophylaxis Ordered: rivaroxaban (Xarelto). Sequential Compression Devices Ordered.  Code Status: Level 1 - Full Code per patient  Discussion with family: Updated  (wife) at bedside.    Anticipated Length of Stay: Patient will be admitted on an inpatient basis with an anticipated length of stay of greater than 2 midnights secondary to sepsis, hypotension, CAP, and LEELEE management.    History of Present Illness   Chief Complaint: Dizziness, lightheadedness, and shortness of breath    Manjit Hernandez is a 74 y.o. male with a PMH of arthritis, asthma, A-fib on Xarelto, [place], COPD, CHF, hypertension, lung cancer, macular degeneration, pulmonary emphysema, pulmonary embolism who presents with dizziness, lightheadedness, and shortness of breath. Patient presented with dizziness, lightheadedness, and shortness of breath that started 4/29 PM. He was recently hospitalized for CHF at [facility]. Likely hypotension due to aggressive diuresis after an ablation and continued use of Lasix at home - patient reports daily 1 lb a day weight loss since his discharge. Chest CT showed new bilateral multi lobar infiltrates, right greater than left attributed to pneumonia. Advise noncontrast chest CT follow-up in 8 weeks after appropriate medical therapy. Pulmonary emphysema. Trace right basilar pleural effusion.  Found to have community-acquired pneumonia, septic shock, and LEELEE.  Received fluids, nebulizers, steroids, and antibiotics in the ED.    Review of Systems   Constitutional:  Positive for chills, diaphoresis, fatigue, fever and unexpected weight change. Negative for activity change and appetite change.   HENT:  Positive for congestion. Negative for ear pain and sore throat.    Eyes:  Negative for pain and visual disturbance.   Respiratory:  Positive for cough and shortness of breath. Negative for chest tightness.     Cardiovascular:  Negative for chest pain and palpitations.   Gastrointestinal:  Negative for abdominal pain, nausea and vomiting.   Endocrine: Negative.    Genitourinary:  Negative for dysuria and hematuria.   Musculoskeletal:  Negative for arthralgias and back pain.   Skin:  Negative for color change and rash.   Allergic/Immunologic: Negative.    Neurological:  Positive for dizziness, weakness and light-headedness. Negative for seizures, syncope, numbness and headaches.   Hematological: Negative.    Psychiatric/Behavioral: Negative.     All other systems reviewed and are negative.    Historical Information   Past Medical History:   Diagnosis Date    Arthritis     (L) hip    Asthma     Atrial fibrillation (HCC)     Cataract     PHIL    CHF (congestive heart failure) (HCC)     COPD (chronic obstructive pulmonary disease) (HCC)     Hernia of abdominal wall     Hip joint pain     (L)    [place] (hard of hearing)     phil hearing aides    Hypertension     Lung cancer (HCC)     Macular degeneration     phil    Psoriasis     elbows and ankles- small  red open areas left ankle    Pulmonary embolism (HCC)     Pulmonary emphysema (HCC)     Wears glasses      Past Surgical History:   Procedure Laterality Date    CARPAL TUNNEL RELEASE Bilateral     CERVICAL FUSION      C 3/4    COLONOSCOPY      DC ARTHRP ACETBLR/PROX FEM PROSTC AGRFT/ALGRFT Left 2016    Procedure: ARTHROPLASTY HIP TOTAL;  Surgeon: Jewel Sales MD;  Location: [facility];  Service: Orthopedics     Social History     Tobacco Use    Smoking status: Former     Current packs/day: 0.00     Average packs/day: 2.0 packs/day for 40.0 years (80.0 ttl pk-yrs)     Types: Cigarettes     Start date: 1973     Quit date: 2013     Years since quittin.2     Passive exposure: Past    Smokeless tobacco: Never   Vaping Use    Vaping status: Never Used   Substance and Sexual Activity    Alcohol use: Not Currently     Comment: 1x year    Drug use: No    Sexual  activity: Not on file     E-Cigarette/Vaping    E-Cigarette Use Never User      E-Cigarette/Vaping Substances    Nicotine No     THC No     CBD No     Flavoring No     Other No     Unknown No      Family History   Problem Relation Age of Onset    No Known Problems Mother     No Known Problems Father     Diabetes Sister     Diabetes Sister     Diabetes Brother     Arthritis Brother     No Known Problems Son     No Known Problems Daughter     No Known Problems Daughter      Social History:  Marital Status: /Civil Union   Occupation: Retired  Patient Pre-hospital Living Situation: Home, With spouse  Patient Pre-hospital Level of Mobility: walks  Patient Pre-hospital Diet Restrictions: Low sodium diet, 8 cups of water a day    Meds/Allergies   I have reviewed home medications with patient family member.  Prior to Admission medications    Medication Sig Start Date End Date Taking? Authorizing Provider   albuterol (2.5 mg/3 mL) 0.083 % nebulizer solution Take 2.5 mg by nebulization every 6 (six) hours as needed for wheezing or shortness of breath   Yes Historical Provider, MD   albuterol (Ventolin HFA) 90 mcg/act inhaler Inhale 2 puffs every 6 (six) hours as needed for wheezing 10/6/21  Yes Jie Vazquez PA-C   amiodarone 200 mg tablet Take 200 mg by mouth daily   Yes Historical Provider, MD   aspirin 81 mg chewable tablet Chew 81 mg daily   Yes Historical Provider, MD   atorvastatin (LIPITOR) 20 mg tablet Take 20 mg by mouth daily after dinner 8/7/19  Yes Historical Provider, MD   bisoprolol (ZEBETA) 5 mg tablet Take 2.5 mg by mouth daily   Yes Historical Provider, MD   Empagliflozin (JARDIANCE PO) Take 10 mg by mouth   Yes Historical Provider, MD   ferrous sulfate 325 (65 Fe) mg tablet Take 325 mg by mouth daily with breakfast   Yes Historical Provider, MD   furosemide (LASIX) 40 mg tablet Take 40 mg by mouth daily   Yes Historical Provider, MD   Magnesium 400 MG TABS Take 1 tablet by mouth 3 (three) times a  day   Yes Historical Provider, MD   omeprazole (PriLOSEC) 40 MG capsule Take 40 mg by mouth daily   Yes Historical Provider, MD   rivaroxaban (Xarelto) 20 mg tablet Take 20 mg by mouth daily after dinner   Yes Historical Provider, MD   digoxin (LANOXIN) 0.125 mg tablet Take 1 tablet (125 mcg total) by mouth daily Do not start before November 4, 2023.  Patient not taking: Reported on 4/30/2025 11/4/23   Margo Sky MD   Fluticasone-Salmeterol (Wixela Inhub) 250-50 mcg/dose inhaler Inhale 1 puff 2 (two) times a day Rinse mouth after use.  Patient not taking: Reported on 4/30/2025 5/18/24   Margo Sky MD   guaiFENesin (MUCINEX) 600 mg 12 hr tablet Take 1 tablet (600 mg total) by mouth every 12 (twelve) hours 5/18/24   Margo Sky MD   Propylene Glycol (SYSTANE BALANCE) 0.6 % SOLN Administer 1 drop to both eyes if needed (dry eyes)  Patient not taking: Reported on 4/30/2025    Historical Provider, MD   Spacer/Aero-Holding Chambers (Carrieham[place] Maria) MISC USE WITH INHALER 9/14/21   Historical Provider, MD     Allergies   Allergen Reactions    Amoxicillin Rash    Polyethylene Glycol Hives    Lisinopril Other (See Comments)     dizziness    Gabapentin      Other reaction(s): PSORASIS FLAIR UP    Latanoprost Eye Swelling    Lumigan [Bimatoprost] Other (See Comments)     Burning in eyes    Meloxicam GI Intolerance    Chlorhexidine Rash     Objective :  Temp:  [98.3 °F (36.8 °C)] 98.3 °F (36.8 °C)  HR:  [56-64] 63  BP: ()/(48-61) 126/61  Resp:  [18-25] 23  SpO2:  [83 %-96 %] 93 %  O2 Device: Nasal cannula  Nasal Cannula O2 Flow Rate (L/min):  [4 L/min-6 L/min] 4 L/min    Physical Exam  Constitutional:       General: He is not in acute distress.     Appearance: He is not ill-appearing.   Cardiovascular:      Rate and Rhythm: Regular rhythm. Bradycardia present.      Pulses: Normal pulses.      Heart sounds: Normal heart sounds. No murmur heard.  Pulmonary:      Effort: Pulmonary effort is normal. No  respiratory distress.      Breath sounds: Decreased breath sounds present. No wheezing or rales.   Abdominal:      General: Bowel sounds are normal. There is no distension.      Palpations: Abdomen is soft.      Tenderness: There is no abdominal tenderness.   Musculoskeletal:         General: No swelling or tenderness.      Right lower leg: No edema.      Left lower leg: No edema.   Skin:     General: Skin is warm and dry.      Findings: No erythema or rash.   Neurological:      General: No focal deficit present.      Mental Status: He is alert. Mental status is at baseline.   Psychiatric:         Mood and Affect: Mood normal.       Lines/Drains:      Lab Results: I have reviewed the following results:  Results from last 7 days   Lab Units 04/30/25  0411   WBC Thousand/uL 29.53*   HEMOGLOBIN g/dL 13.2   HEMATOCRIT % 40.6   PLATELETS Thousands/uL 483*   SEGS PCT % 92*   LYMPHO PCT % 3*   MONO PCT % 4   EOS PCT % 0     Results from last 7 days   Lab Units 04/30/25  0611   SODIUM mmol/L 137   POTASSIUM mmol/L 4.1   CHLORIDE mmol/L 102   CO2 mmol/L 27   BUN mg/dL 28*   CREATININE mg/dL 1.40*   ANION GAP mmol/L 8   CALCIUM mg/dL 8.2*   ALBUMIN g/dL 3.3*   TOTAL BILIRUBIN mg/dL 1.95*   ALK PHOS U/L 59   ALT U/L 27   AST U/L 18   GLUCOSE RANDOM mg/dL 167*     Results from last 7 days   Lab Units 04/30/25  0411   INR  3.35*     Lab Results   Component Value Date    HGBA1C 6.9 (H) 11/29/2024    HGBA1C 6.8 (H) 09/19/2024    HGBA1C 6.1 (H) 04/24/2024     Results from last 7 days   Lab Units 04/30/25  0611 04/30/25  0411   LACTIC ACID mmol/L  --  1.6   PROCALCITONIN ng/ml 0.29* 0.24     Imaging Results Review: I reviewed radiology reports from this admission including: chest xray and CT chest.  Other Study Results Review: EKG was reviewed.     Administrative Statements     ** Please Note: This note has been constructed using a voice recognition system. **

## 2025-04-30 NOTE — ASSESSMENT & PLAN NOTE
Wt Readings from Last 3 Encounters:   04/30/25 135 kg (296 lb 11.8 oz)   05/15/24 (!) 138 kg (303 lb 5.7 oz)   10/30/23 (!) 148 kg (327 lb)   History of CHF with preserved ejection fraction  Recently underwent ablation at [facility] [place].  Echo 10/2023: The left ventricular ejection fraction is 45-50% by visual estimation. Systolic function is mildly reduced.   Hold home Lasix due to LEELEE and hypotension  Monitor volume status  Daily weights and strict I's and O's  Troponin: 17 -> 13  BNP elevated at 101

## 2025-04-30 NOTE — ASSESSMENT & PLAN NOTE
Noted history of PE s/p lung mass resection  Systemically anticoagulated  Chronic condition/stable

## 2025-04-30 NOTE — ASSESSMENT & PLAN NOTE
Improving (5/1)-maintaining adequate saturation on 1 L  O2 saturation in the ED 83% on RA, placed on 5 L NC and at 89%  Oxygen titrated to 6 L NC at 92 to 96% (4/30)  Received DuoNeb/steroids in the ED  Index of suspicion for multifocal pneumonia as source  Low index suspicion for COPD exacerbation given lack of wheezing/bronchospasm  Continue empiric antimicrobials  Continue home nebulizer treatments  Patient denies any home oxygen use, wean as able    May require ambulatory pulse oximetry prior to discharge  Tonight (5/1) will order nocturnal pulse oximetry

## 2025-04-30 NOTE — ASSESSMENT & PLAN NOTE
O2 saturation in the ED 83% on RA, placed on 5 L NC and at 89%  Oxygen titrated to 6 L NC at 92 to 96%  Initial VBG showed metabolic alkalosis*9  Repeat VBG showed respiratory alkalosis  Received DuoNeb in the ED, continue  Continue home nebulizer treatments  Patient denies any home oxygen use, wean as able

## 2025-04-30 NOTE — ED PROVIDER NOTES
Time reflects when diagnosis was documented in both MDM as applicable and the Disposition within this note       Time User Action Codes Description Comment    4/30/2025  5:06 AM RemJaret payton Add [J18.9] Pneumonia     4/30/2025  5:06 AM RemJaret payton Add [R09.02] Hypoxia     4/30/2025  5:07 AM Remaley Jaret Add [I95.9] Hypotension     4/30/2025  5:07 AM Remalejuma Jaret Add [I50.9] CHF, acute on chronic (HCC)     4/30/2025  5:07 AM Remaley, Jaret Add [J44.9] COPD (chronic obstructive pulmonary disease) (HCC)     4/30/2025  5:07 AM RemaleJaret dennison Add [A41.9] Sepsis (HCC)           ED Disposition       ED Disposition   Admit    Condition   Stable    Date/Time   Wed Apr 30, 2025  5:07 AM    Comment   Case was discussed with  and the patient's admission status was agreed to be observation status to the service of   .               Assessment & Plan       Medical Decision Making  Problems Addressed:  CHF, acute on chronic (HCC): acute illness or injury  COPD (chronic obstructive pulmonary disease) (HCC): acute illness or injury  Hypotension: acute illness or injury  Hypoxia: acute illness or injury  Pneumonia: acute illness or injury  Sepsis (HCC): acute illness or injury    Amount and/or Complexity of Data Reviewed  Labs: ordered. Decision-making details documented in ED Course.  Radiology: ordered and independent interpretation performed. Decision-making details documented in ED Course.  ECG/medicine tests: ordered and independent interpretation performed. Decision-making details documented in ED Course.    Risk  Prescription drug management.  Decision regarding hospitalization.        ED Course as of 04/30/25 0516   Wed Apr 30, 2025   0455 The 30ml/kg fluid bolus was not given to the patient despite hypotension and/or significantly elevated lactate of = 4 and/or presence of septic shock due to: Concern for fluid/volume overload. The patient will be administered 500 ml of crystalloid fluid instead. Orders for this  have been placed in [facility]. The patient may receive additional colloid or crystalloid fluids thereafter based on clinical condition.     Jaret Burton,      0456 Critical Care Time Statement: Upon my evaluation, this patient had a high probability of imminent or life-threatening deterioration due to sepsis, which required my direct attention, intervention, and personal management.  I spent a total of 60 minutes directly providing critical care services, including interpretation of complex medical databases, evaluating for the presence of life-threatening injuries or illnesses, management of organ system failure(s) , complex medical decision making (to support/prevent further life-threatening deterioration)., and interpretation of hemodynamic data. This time is exclusive of procedures, teaching, treating other patients, family meetings, and any prior time recorded by providers other than myself.       0515 Spoke with Dr. Lima critical care on-call reviewed case and findings in the emergency department and management thus far felt patient stable and appropriate for admission to [facility] stepdown 2 at this time.     0515 Spoke with Creole hospitalist advanced practitioner on-call reviewed case and findings in the emergency department management as far and critical care recommendations accepts for admission on behalf of Dr. Elliott.         Medications   sodium chloride 0.9 % bolus 500 mL (500 mL Intravenous New Bag 4/30/25 0430)   vancomycin (VANCOCIN) 1500 mg in sodium chloride 0.9% 250 mL IVPB (1,500 mg Intravenous New Bag 4/30/25 0513)   ipratropium-albuterol (DUO-NEB) 0.5-2.5 mg/3 mL inhalation solution 3 mL (3 mL Nebulization Given 4/30/25 0429)   cefepime (MAXIPIME) IVPB (premix in dextrose) 2,000 mg 50 mL (0 mg Intravenous Stopped 4/30/25 0511)   dexamethasone (PF) (DECADRON) injection 10 mg (10 mg Intravenous Given 4/30/25 0456)       ED Risk Strat Scores                    No data recorded        SBIRT 22yo+       Flowsheet Row Most Recent Value   Initial Alcohol Screen: US AUDIT-C     1. How often do you have a drink containing alcohol? 0 Filed at: 04/30/2025 0410   2. How many drinks containing alcohol do you have on a typical day you are drinking?  0 Filed at: 04/30/2025 0410   3a. Male UNDER 65: How often do you have five or more drinks on one occasion? 0 Filed at: 04/30/2025 0410   3b. FEMALE Any Age, or MALE 65+: How often do you have 4 or more drinks on one occassion? 0 Filed at: 04/30/2025 0410   Audit-C Score 0 Filed at: 04/30/2025 0410   AUGIE: How many times in the past year have you...    Used an illegal drug or used a prescription medication for non-medical reasons? Never Filed at: 04/30/2025 4620                            History of Present Illness       Chief Complaint   Patient presents with    Shortness of Breath     Started yesterday with SOB, has an ablation on 4/16/2025 at [facility]. Has recent hospital admission for CHF. Goes to [facility] for his cardiac care. Stated he feels like he has no pep.        Past Medical History:   Diagnosis Date    Arthritis     (L) hip    Asthma     Atrial fibrillation (HCC)     Cataract     PHIL    CHF (congestive heart failure) (HCC)     COPD (chronic obstructive pulmonary disease) (HCC)     Hernia of abdominal wall     Hip joint pain     (L)    [place] (hard of hearing)     phil hearing aides    Hypertension     Lung cancer (HCC)     Macular degeneration     phil    Psoriasis     elbows and ankles- small  red open areas left ankle    Pulmonary embolism (HCC)     Pulmonary emphysema (HCC)     Wears glasses       Past Surgical History:   Procedure Laterality Date    CARPAL TUNNEL RELEASE Bilateral     CERVICAL FUSION      C 3/4    COLONOSCOPY      HI ARTHRP ACETBLR/PROX FEM PROSTC AGRFT/ALGRFT Left 2/26/2016    Procedure: ARTHROPLASTY HIP TOTAL;  Surgeon: Jewel Sales MD;  Location: [facility];  Service: Orthopedics      History reviewed. No pertinent family history.   Social  History     Tobacco Use    Smoking status: Former     Current packs/day: 0.00     Average packs/day: 2.0 packs/day for 40.0 years (80.0 ttl pk-yrs)     Types: Cigarettes     Start date: 1973     Quit date: 2013     Years since quittin.2    Smokeless tobacco: Never   Vaping Use    Vaping status: Never Used   Substance Use Topics    Alcohol use: Not Currently     Comment: 1x year    Drug use: No      E-Cigarette/Vaping    E-Cigarette Use Never User       E-Cigarette/Vaping Substances      I have reviewed and agree with the history as documented.     Patient is a 74-year-old male history of CHF COPD atrial fibrillation pulmonary embolism asthma and hypertension lung cancer presents to the emergency department complaining of dizziness lightheadedness and shortness of breath and generalized weakness and fatigue symptoms started yesterday still present worsening this morning.  No fevers or chills no cough or chest pain.  Patient recently hospitalized that outside hospital for CHF after having a ablation recently as well.  Patient has been losing 1 pound per day since being discharged after treatment for CHF exacerbation.        History provided by:  Patient  Shortness of Breath  Severity:  Moderate  Onset quality:  Gradual  Duration:  1 day  Timing:  Constant  Progression:  Worsening  Chronicity:  Recurrent  Associated symptoms: no abdominal pain, no chest pain, no cough, no fever, no headaches and no vomiting        Review of Systems   Constitutional:  Positive for fatigue. Negative for fever.   Respiratory:  Positive for shortness of breath. Negative for cough.    Cardiovascular:  Negative for chest pain.   Gastrointestinal:  Negative for abdominal pain, nausea and vomiting.   Neurological:  Positive for dizziness, weakness and light-headedness. Negative for numbness and headaches.   All other systems reviewed and are negative.          Objective       ED Triage Vitals [25 0410]   Temperature Pulse  Blood Pressure Respirations SpO2 Patient Position - Orthostatic VS   98.3 °F (36.8 °C) 64 (!) 81/50 20 (!) 83 % Lying      Temp Source Heart Rate Source BP Location FiO2 (%) Pain Score    Temporal Monitor Left arm -- --      Vitals      Date and Time Temp Pulse SpO2 Resp BP Pain Score FACES Pain Rating User   25 0500 -- 59 96 % 23 107/53 -- -- TH   25 0445 -- 56 94 % 23 93/52 -- -- TH   25 0430 -- 58 93 % 18  85/48 -- -- TH   25 -- 62 94 % 20  84/52 -- -- TH   25 -- -- 89 % -- -- -- -- SV   25 98.3 °F (36.8 °C) 64 83 % 20 81/50 -- -- SV            Physical Exam  Vitals and nursing note reviewed.   Constitutional:       General: He is not in acute distress.     Appearance: Normal appearance.   HENT:      Head: Normocephalic and atraumatic.      Nose: Nose normal.   Eyes:      Conjunctiva/sclera: Conjunctivae normal.   Cardiovascular:      Rate and Rhythm: Normal rate and regular rhythm.   Pulmonary:      Effort: Pulmonary effort is normal. No respiratory distress.      Breath sounds: Examination of the right-lower field reveals decreased breath sounds. Examination of the left-lower field reveals decreased breath sounds. Decreased breath sounds and wheezing present.   Musculoskeletal:      Right lower le+ Pitting Edema present.      Left lower le+ Pitting Edema present.   Skin:     General: Skin is dry.   Neurological:      General: No focal deficit present.      Mental Status: He is alert and oriented to person, place, and time.         Results Reviewed       Procedure Component Value Units Date/Time    Phosphorus [[id number]]     Lab Status: No result Specimen: Blood     HS Troponin 0hr (reflex protocol) [[id number]]  (Normal) Collected: 25    Lab Status: Final result Specimen: Blood from Arm, Right Updated: 25     hs TnI 0hr 17 ng/L     HS Troponin I 2hr [[id number]]     Lab Status: No result Specimen: Blood     Comprehensive metabolic  panel [[id number]]  (Abnormal) Collected: 04/30/25 0411    Lab Status: Final result Specimen: Blood from Arm, Right Updated: 04/30/25 0456     Sodium 136 mmol/L      Potassium 4.0 mmol/L      Chloride 100 mmol/L      CO2 28 mmol/L      ANION GAP 8 mmol/L      BUN 30 mg/dL      Creatinine 1.53 mg/dL      Glucose 169 mg/dL      Calcium 8.8 mg/dL      AST 20 U/L      ALT 30 U/L      Alkaline Phosphatase 67 U/L      Total Protein 6.9 g/dL      Albumin 3.6 g/dL      Total Bilirubin 2.03 mg/dL      eGFR 43 ml/min/1.73sq m     Narrative:      National Kidney Disease Foundation guidelines for Chronic Kidney Disease (CKD):     Stage 1 with normal or high GFR (GFR > 90 mL/min/1.73 square meters)    Stage 2 Mild CKD (GFR = 60-89 mL/min/1.73 square meters)    Stage 3A Moderate CKD (GFR = 45-59 mL/min/1.73 square meters)    Stage 3B Moderate CKD (GFR = 30-44 mL/min/1.73 square meters)    Stage 4 Severe CKD (GFR = 15-29 mL/min/1.73 square meters)    Stage 5 End Stage CKD (GFR <15 mL/min/1.73 square meters)  Note: GFR calculation is accurate only with a steady state creatinine    C-reactive protein [[id number]]  (Abnormal) Collected: 04/30/25 0411    Lab Status: Final result Specimen: Blood from Arm, Right Updated: 04/30/25 0456     CRP 39.1 mg/L     Magnesium [[id number]]  (Normal) Collected: 04/30/25 0411    Lab Status: Final result Specimen: Blood from Arm, Right Updated: 04/30/25 0456     Magnesium 2.3 mg/dL     Lactic acid, plasma (w/reflex if result > 2.0) [[id number]]  (Normal) Collected: 04/30/25 0411    Lab Status: Final result Specimen: Blood from Arm, Right Updated: 04/30/25 0455     LACTIC ACID 1.6 mmol/L     Narrative:      Result may be elevated if tourniquet was used during collection.    Protime-INR [[id number]]  (Abnormal) Collected: 04/30/25 0411    Lab Status: Final result Specimen: Blood from Arm, Right Updated: 04/30/25 0452     Protime 33.7 seconds      INR 3.35    Narrative:      INR Therapeutic  Range    Indication                                             INR Range      Atrial Fibrillation                                               2.0-3.0  Hypercoagulable State                                    2.0.2.3  Left Ventricular Asist Device                            2.0-3.0  Mechanical Heart Valve                                  -    Aortic(with afib, MI, embolism, HF, LA enlargement,    and/or coagulopathy)                                     2.0-3.0 (2.5-3.5)     Mitral                                                             2.5-3.5  Prosthetic/Bioprosthetic Heart Valve               2.0-3.0  Venous thromboembolism (VTE: VT, PE        2.0-3.0    APTT [[id number]]  (Abnormal) Collected: 04/30/25 0411    Lab Status: Final result Specimen: Blood from Arm, Right Updated: 04/30/25 0452     PTT 40 seconds     FLU/COVID Rapid Antigen (30 min. TAT) - Preferred screening test in ED [[id number]]  (Normal) Collected: 04/30/25 0417    Lab Status: Final result Specimen: Nares from Nose Updated: 04/30/25 0451     SARS COV Rapid Antigen Negative     Influenza A Rapid Antigen Negative     Influenza B Rapid Antigen Negative    Narrative:      This test has been performed using the [employer] [facility] 2 FLU+SARS Antigen test under the Emergency Use Authorization (EUA). This test has been validated by the  and verified by the performing laboratory. The Ana [facility] uses lateral flow immunofluorescent sandwich assay to detect SARS-COV, Influenza A and Influenza B Antigen.     The [facility] Ana Maria 2 SARS Antigen test does not differentiate between SARS-CoV and SARS-CoV-2.     Negative results are presumptive and may be confirmed with a molecular assay, if necessary, for patient management. Negative results do not rule out SARS-CoV-2 or influenza infection and should not be used as the sole basis for treatment or patient management decisions. A negative test result may occur if the level of antigen in a sample is below the limit  of detection of this test.     Positive results are indicative of the presence of viral antigens, but do not rule out bacterial infection or co-infection with other viruses.     All test results should be used as an adjunct to clinical observations and other information available to the provider.    FOR PEDIATRIC PATIENTS - copy/paste COVID Guidelines URL to browser: [email or web address]    Blood gas, venous [[id number]]  (Abnormal) Collected: 04/30/25 0411    Lab Status: Final result Specimen: Blood from Arm, Right Updated: 04/30/25 0445     pH, Calvin 7.427     pCO2, Calvin 44.8 mm Hg      pO2, Calvin 40.2 mm Hg      HCO3, Calvin 28.9 mmol/L      Base Excess, Calvin 3.9 mmol/L      O2 Content, Calvin 14.2 ml/dL      O2 HGB, VENOUS 70.8 %     Sedimentation rate, automated [[id number]]  (Abnormal) Collected: 04/30/25 0411    Lab Status: Final result Specimen: Blood from Arm, Right Updated: 04/30/25 0439     Sed Rate 36 mm/hour     CBC and differential [[id number]]  (Abnormal) Collected: 04/30/25 0411    Lab Status: Preliminary result Specimen: Blood from Arm, Right Updated: 04/30/25 0434     WBC 29.53 Thousand/uL      RBC 4.44 Million/uL      Hemoglobin 13.2 g/dL      Hematocrit 40.6 %      MCV 91 fL      MCH 29.7 pg      MCHC 32.5 g/dL      RDW 13.8 %      MPV 8.9 fL      Platelets 483 Thousands/uL     Procalcitonin [[id number]] Collected: 04/30/25 0411    Lab Status: In process Specimen: Blood from Arm, Right Updated: 04/30/25 0432    B-Type Natriuretic Peptide(BNP) [[id number]] Collected: 04/30/25 0411    Lab Status: In process Specimen: Blood from Arm, Right Updated: 04/30/25 0431    Blood culture #1 [[id number]] Collected: 04/30/25 0411    Lab Status: In process Specimen: Blood from Arm, Right Updated: 04/30/25 0429    Blood culture #2 [[id number]] Collected: 04/30/25 0417    Lab Status: In process Specimen: Blood from Arm, Left Updated: 04/30/25 0429    UA w Reflex to Microscopic w  Reflex to Culture [[id number]]     Lab Status: No result Specimen: Urine             XR chest 1 view portable   ED Interpretation by Jaret Burton DO (04/30 0455)   Bilateral pulmonary infiltrates and pulmonary venous congestion/edema      CT chest wo contrast    (Results Pending)       ECG 12 Lead Documentation Only    Date/Time: 4/30/2025 4:14 AM    Performed by: Jaret Burton DO  Authorized by: Jaret Burton DO    ECG reviewed by me, the ED Provider: yes    Patient location:  ED  Previous ECG:     Comparison to cardiac monitor: Yes    Quality:     Tracing quality:  Limited by artifact  Rate:     ECG rate:  64    ECG rate assessment: normal    Rhythm:     Rhythm: sinus rhythm    QRS:     QRS axis:  Left    QRS intervals:  Normal  Conduction:     Conduction: normal    ST segments:     ST segments:  Normal  T waves:     T waves: normal        ED Medication and Procedure Management   Prior to Admission Medications   Prescriptions Last Dose Informant Patient Reported? Taking?   Empagliflozin (JARDIANCE PO) 4/29/2025 Noon  Yes Yes   Sig: Take 10 mg by mouth   Fluticasone-Salmeterol (Wixela Inhub) 250-50 mcg/dose inhaler Not Taking  No No   Sig: Inhale 1 puff 2 (two) times a day Rinse mouth after use.   Patient not taking: Reported on 4/30/2025   Magnesium 400 MG TABS 4/29/2025 Morning  Yes Yes   Sig: Take 1 tablet by mouth 3 (three) times a day   Propylene Glycol (SYSTANE BALANCE) 0.6 % SOLN Not Taking  Yes No   Sig: Administer 1 drop to both eyes if needed (dry eyes)   Patient not taking: Reported on 4/30/2025   Spacer/Aero-Holding Chambers (OptiChamber Rosa Maria) MISC   Yes No   Sig: USE WITH INHALER   albuterol (2.5 mg/3 mL) 0.083 % nebulizer solution 4/29/2025 Evening  Yes Yes   Sig: Take 2.5 mg by nebulization every 6 (six) hours as needed for wheezing or shortness of breath   albuterol (Ventolin HFA) 90 mcg/act inhaler 4/30/2025 Morning  No Yes   Sig: Inhale 2 puffs every 6 (six) hours as needed for wheezing    amiodarone 200 mg tablet 4/29/2025 Morning  Yes Yes   Sig: Take 200 mg by mouth daily   aspirin 81 mg chewable tablet 4/29/2025 Morning  Yes Yes   Sig: Chew 81 mg daily   atorvastatin (LIPITOR) 20 mg tablet 4/29/2025 Evening  Yes Yes   Sig: Take 20 mg by mouth daily after dinner   bisoprolol (ZEBETA) 5 mg tablet 4/29/2025 Evening  Yes Yes   Sig: Take 2.5 mg by mouth daily   digoxin (LANOXIN) 0.125 mg tablet Not Taking  No No   Sig: Take 1 tablet (125 mcg total) by mouth daily Do not start before November 4, 2023.   Patient not taking: Reported on 4/30/2025   ferrous sulfate 325 (65 Fe) mg tablet 4/29/2025 Noon  Yes Yes   Sig: Take 325 mg by mouth daily with breakfast   furosemide (LASIX) 40 mg tablet 4/29/2025 Morning  Yes Yes   Sig: Take 40 mg by mouth daily   guaiFENesin (MUCINEX) 600 mg 12 hr tablet   No No   Sig: Take 1 tablet (600 mg total) by mouth every 12 (twelve) hours   omeprazole (PriLOSEC) 40 MG capsule 4/29/2025 Morning  Yes Yes   Sig: Take 40 mg by mouth daily   rivaroxaban (Xarelto) 20 mg tablet 4/29/2025 Evening  Yes Yes   Sig: Take 20 mg by mouth daily after dinner      Facility-Administered Medications: None     Patient's Medications   Discharge Prescriptions    No medications on file     No discharge procedures on file.  ED SEPSIS DOCUMENTATION   Time reflects when diagnosis was documented in both MDM as applicable and the Disposition within this note       Time User Action Codes Description Comment    4/30/2025  5:06 AM Jaret Burton [J18.9] Pneumonia     4/30/2025  5:06 AM Jaret Burton [R09.02] Hypoxia     4/30/2025  5:07 AM Jaret Burton [I95.9] Hypotension     4/30/2025  5:07 AM Jaret Burton [I50.9] CHF, acute on chronic (HCC)     4/30/2025  5:07 AM Jaret Burton [J44.9] COPD (chronic obstructive pulmonary disease) ([facility])     4/30/2025  5:07 AM Jaret Burton [A41.9] Sepsis (HCC)                  Jaret Burton DO  04/30/25 0516

## 2025-04-30 NOTE — ASSESSMENT & PLAN NOTE
BP in the ED 81/50, current /51  Home medication: Lasix and bisoprolol  Will hold Lasix and bisoprolol due to hypotension secondary to over diuresis  Monitor BP

## 2025-04-30 NOTE — ASSESSMENT & PLAN NOTE
Continue home Xarelto and bisoprolol  Rate controlled  EKG on admission normal sinus rhythm rate of 64  Patient notes recent cardiac ablation-follows with [facility] cardiology  Chronic condition/stable

## 2025-04-30 NOTE — ASSESSMENT & PLAN NOTE
Patient presented with dizziness, lightheadedness, and shortness of breath that started 4/29 PM.  As above  Urine strep pneumonia Legionella negative  (4/30) de-escalated to Rocephin Zithromax given improvement  Wean oxygen to off-today still requiring 1 L nasal cannula  Likely, etiology of acute hypoxemic respiratory failure and sepsis upon presentation

## 2025-04-30 NOTE — ASSESSMENT & PLAN NOTE
Continue home Xarelto and bisoprolol  Rate controlled  EKG on admission normal sinus rhythm rate of 64

## 2025-04-30 NOTE — ASSESSMENT & PLAN NOTE
Resolved-likely prerenal in the setting of sepsis upon presentation  Lab Results   Component Value Date    CREATININE 1.10 05/01/2025    CREATININE 1.40 (H) 04/30/2025    CREATININE 1.53 (H) 04/30/2025    CREATININE 1.05 03/27/2025   Creatinine on admission noted to be 1.53  Baseline creatinine 0.8-1.0  Avoid nephrotoxic medications and hypotension

## 2025-04-30 NOTE — ASSESSMENT & PLAN NOTE
Patient presented with dizziness, lightheadedness, and shortness of breath that started 4/29 PM.  As above  Urine strep pneumonia Legionella pending  Received cefepime 2000 mg IV and vancomycin 1500 mg IV in the ED, continue cefepime 2000 mg IV   No

## 2025-04-30 NOTE — ASSESSMENT & PLAN NOTE
"Met sepsis criteria with RR 23, WBC 29.53, pneumonia and BP of 81/50  Chest CT: \"New bilateral multi lobar infiltrates, right greater than left attributed to pneumonia. Advise noncontrast chest CT follow-up in 8 weeks after appropriate medical therapy. Pulmonary emphysema. Trace right basilar pleural effusion.\"  Chest x-ray showed bilateral peripheral pneumonia and pulmonary venous congestion/edema.  COVID/flu negative  No fever, lactic acid normal at 1.6, procalcitonin initially normal at 0.24  WBC elevated at 29.53, trend  Repeat procalcitonin elevated at 0.29, trend  C-reactive protein elevated at 39.1, trend  Sed rate elevated at 36, trend  UA: Trace blood, elevated leukocytes  Blood cultures and MRSA pending  Received 500 mL NS bolus in the ED, continue NS IVF at 50 mL/hr  Received cefepime 2000 mg IV and vancomycin 1500 mg IV in the ED, continue cefepime 2000 mg IV  Monitor fever curve, vital signs, and symptoms  "

## 2025-04-30 NOTE — ASSESSMENT & PLAN NOTE
Noted history of COPD  Does not appear to be in exacerbation-without accessory respiratory muscle use or expiratory wheezing  Continue to monitor  Continue nebulized bronchodilators/home regimen  Received IV steroids in the ED

## 2025-04-30 NOTE — CASE MANAGEMENT
Case Management Assessment & Discharge Planning Note    Patient name Manjit Hernandez  Location /-01 MRN [id number]  : 1950 Date 2025       Current Admission Date: 2025  Current Admission Diagnosis:Septic shock ([facility])   Patient Active Problem List    Diagnosis Date Noted Date Diagnosed    Hypotension 2025     LEELEE (acute kidney injury) ([facility]) 2025     Acute respiratory failure with hypoxia ([facility]) 2025     Heart valve disease 2025     Acute CHF (congestive heart failure) ([facility]) 2025     Elevated troponin level 2025     Right arm pain 2024     CAP (community acquired pneumonia) 2024     History of total left knee replacement (TKR) 2024     Septic shock ([facility]) 2024     Anemia 2024     Hyperbilirubinemia 2024     History of COPD 2024     Chronic diastolic congestive heart failure ([facility]) 10/31/2023     Lung mass 10/29/2023     COVID 10/29/2023     PAF (paroxysmal atrial fibrillation) ([facility]) 10/29/2023     Hypomagnesemia 2023     Atherosclerotic heart disease of native coronary artery without angina pectoris 2023     Morbid obesity ([facility]) 2021     Chronic bronchitis ([facility]) 2021     Diabetes mellitus ([facility]) 03/10/2021     Mixed simple and mucopurulent chronic bronchitis ([facility]) 2019     Hypoxemia requiring supplemental oxygen 2019     History of pulmonary embolism 2019     Malignant neoplasm of lower lobe, right bronchus or lung ([facility]) 2019     Pulmonary embolism ([facility]) 2019     Coronary atherosclerosis due to calcified coronary lesion 2018     Asthma without status asthmaticus 2016     Essential (primary) hypertension 2016     Chronic obstructive pulmonary disease with acute lower respiratory infection ([facility]) 2012     Hyperlipidemia 2010     Tobacco user 2010       LOS (days): 0  Geometric Mean LOS (GMLOS) (days):   Days to GMLOS:      OBJECTIVE:    Risk of Unplanned Readmission Score: 28.66         Current admission status: Inpatient  Referral Reason:  (dc planning)    Preferred Pharmacy:   [facility]/pharmacy #[id number] - [place], [place] - [address] [place] [id number]  Phone: [phone] Fax: [phone]    Primary Care Provider: Vicki Cota DO    Primary Insurance: MEDICARE  Secondary Insurance: [employer]      CM met with patient at the bedside,baseline information  was obtained. CM discussed the role of CM in helping the patient develop a discharge plan and assist the patient in carry out their plan.      ASSESSMENT:  Active Health Care Proxies    There are no active Health Care Proxies on file.       Advance Directives  Does patient have a Health Care POA?: Yes  Primary Contact: Erum Hernandez wife, see face sheet         Readmission Root Cause  30 Day Readmission: No    Patient Information  Admitted from:: Home  Mental Status: Alert  During Assessment patient was accompanied by: Not accompanied during assessment  Assessment information provided by:: Patient  Primary Caregiver: Self  Support Systems: Spouse/significant other, Children (Simultaneous filing. User may not have seen previous data.)  [facility] Residence: [facility]  What city do you live in?: [place]  Home entry access options. Select all that apply.: Stairs  Number of steps to enter home.: 2  Do the steps have railings?: Yes  Type of Current Residence: [facility] (Simultaneous filing. User may not have seen previous data.)  Living Arrangements: Lives w/ Spouse/significant other (Simultaneous filing. User may not have seen previous data.)  Is patient a ?: No    Activities of Daily Living Prior to Admission  Functional Status: Independent  Completes ADLs independently?: Yes  Ambulates independently?: Yes  Does patient use assisted devices?: Yes  Assisted Devices (DME) used: Straight Cane  Does patient currently own DME?: Yes  What DME  does the patient currently own?: Shower Chair, Nebulizer, Straight Cane  Does patient have a history of Outpatient Therapy (PT/OT)?: Yes ([facility])  Does the patient have a history of Short-Term Rehab?: No  Does patient have a history of HHC?: Yes ([facility])  Does patient currently have HHC?: No         Patient Information Continued  Income Source: Pension/correction  Does patient have prescription coverage?: Yes  Can the patient afford their medications and any related supplies (such as glucometers or test strips)?: Yes  Does patient receive dialysis treatments?: No  Does patient have a history of substance abuse?: No  Does patient have a history of Mental Health Diagnosis?: No         Means of Transportation  Means of Transport to Appts:: Drives Self      Social Determinants of Health (SDOH)      Flowsheet Row Most Recent Value   Housing Stability    In the last 12 months, was there a time when you were not able to pay the mortgage or rent on time? N   In the past 12 months, how many times have you moved where you were living? 0   At any time in the past 12 months, were you homeless or living in a shelter (including now)? N   Transportation Needs    In the past 12 months, has lack of transportation kept you from medical appointments or from getting medications? no   In the past 12 months, has lack of transportation kept you from meetings, work, or from getting things needed for daily living? No   Food Insecurity    Within the past 12 months, you worried that your food would run out before you got the money to buy more. Never true   Within the past 12 months, the food you bought just didn't last and you didn't have money to get more. Never true   Utilities    In the past 12 months has the electric, gas, oil, or water company threatened to shut off services in your home? No          Patient resides with his wife of 55 years, uses a cane for ambulating. +  and able to care for self.  Pt does have 3 adult  children.  At this time not interested in HHC, he feels he will be fine.  DISCHARGE DETAILS:    Discharge planning discussed with:: patient  Freedom of Choice: Yes  Comments - Freedom of Choice: At this time, patient is not anticipating any dc needs  CM contacted family/caregiver?: No- see comments        Requested Home Health Care         Is the patient interested in HHC at discharge?: No    DME Referral Provided  Referral made for DME?: No        Discharge Destination Plan::  (TBD therapy to see)  Transport at Discharge : Family      Anticipate dc will be home, CM will follow for any dc needs.

## 2025-04-30 NOTE — ED NOTES
Respiratory called to come to bedside to assess pt and place on mid flow.     Elizabeth Brown RN  04/30/25 0555

## 2025-04-30 NOTE — RESPIRATORY THERAPY NOTE
RT Protocol Note  Manjit Hernandez 74 y.o. male MRN: [id number]  Unit/Bed#: -01 Encounter: [id number]    Assessment    Principal Problem:    Septic shock (HCC)  Active Problems:    History of pulmonary embolism    Essential (primary) hypertension    Chronic obstructive pulmonary disease with acute lower respiratory infection (HCC)    PAF (paroxysmal atrial fibrillation) (HCC)    Chronic diastolic congestive heart failure (HCC)    CAP (community acquired pneumonia)    Hypotension    LEELEE (acute kidney injury) (HCC)    Acute respiratory failure with hypoxia (HCC)      Home Pulmonary Medications:         Past Medical History:   Diagnosis Date    Arthritis     (L) hip    Asthma     Atrial fibrillation (HCC)     Cataract     PHIL    CHF (congestive heart failure) (HCC)     COPD (chronic obstructive pulmonary disease) (HCC)     Hernia of abdominal wall     Hip joint pain     (L)    [place] (hard of hearing)     phil hearing aides    Hypertension     Lung cancer (HCC)     Macular degeneration     phil    Psoriasis     elbows and ankles- small  red open areas left ankle    Pulmonary embolism (HCC)     Pulmonary emphysema (HCC)     Wears glasses      Social History     Socioeconomic History    Marital status: /Civil Union     Spouse name: None    Number of children: None    Years of education: None    Highest education level: None   Occupational History    None   Tobacco Use    Smoking status: Former     Current packs/day: 0.00     Average packs/day: 2.0 packs/day for 40.0 years (80.0 ttl pk-yrs)     Types: Cigarettes     Start date: 1973     Quit date: 2013     Years since quittin.2     Passive exposure: Past    Smokeless tobacco: Never   Vaping Use    Vaping status: Never Used   Substance and Sexual Activity    Alcohol use: Not Currently     Comment: 1x year    Drug use: No    Sexual activity: None   Other Topics Concern    None   Social History Narrative    None     Social Drivers of Health  "    Financial Resource Strain: Not on file   Food Insecurity: No Food Insecurity (5/16/2024)    Nursing - Inadequate Food Risk Classification     Worried About Running Out of Food in the Last Year: Never true     Ran Out of Food in the Last Year: Never true     Ran Out of Food in the Last Year: Not on file   Transportation Needs: No Transportation Needs (5/16/2024)    PRAPARE - Transportation     Lack of Transportation (Medical): No     Lack of Transportation (Non-Medical): No   Physical Activity: Not on file   Stress: Not on file   Social Connections: Unknown (6/18/2024)    Received from [facility]     How often do you feel lonely or isolated from those around you? (Adult - for ages 18 years and over): Not on file   Intimate Partner Violence: Not on file   Housing Stability: Low Risk  (5/16/2024)    Housing Stability Vital Sign     Unable to Pay for Housing in the Last Year: No     Number of Times Moved in the Last Year: 1     Homeless in the Last Year: No       Subjective         Objective    Physical Exam:   Assessment Type: During-treatment  General Appearance: Awake, Alert  Respiratory Pattern: Normal  Chest Assessment: Chest expansion symmetrical  Bilateral Breath Sounds: Clear  O2 Device: 3L    Vitals:  Blood pressure 131/58, pulse 60, temperature 97.8 °F (36.6 °C), temperature source Temporal, resp. rate (!) 31, height 6' 1\" (1.854 m), weight 135 kg (296 lb 11.8 oz), SpO2 97%.          Imaging and other studies:     O2 Device: 3L     Plan    Respiratory Plan: No distress/Pulmonary history        Resp Comments: Pt admitted w/ sepsis. Has HO COPD quit smoking in the 90s but had smoked up to 2 ppd . Home reg is as needed w/ SOB . Pt states he had lea and at rest yesterday but feels better today. no home O2 use currently on 3L SPO2 is 94% BS clear bilaterally   "

## 2025-04-30 NOTE — ASSESSMENT & PLAN NOTE
Patient presented with dizziness, lightheadedness, and shortness of breath that started 4/29 PM. He was recently hospitalized for CHF at [facility]. Likely hypotension due to aggressive diuresis after an ablation and continued use of Lasix at home - patient reports daily 1 lb a day weight loss since his discharge.   BP in the ED of 81/50 and hypoxia 83% on RA   Received 500 mL NS bolus and BP improved to 107/53.   Will hold home Lasix and bisoprolol due to hypotension secondary to over diuresis  Received 500 mL NS bolus in the ED, continue NS IVF at 50 mL/hr  Monitor BP

## 2025-04-30 NOTE — ASSESSMENT & PLAN NOTE
Wt Readings from Last 3 Encounters:   05/01/25 133 kg (292 lb 12.8 oz)   05/15/24 (!) 138 kg (303 lb 5.7 oz)   10/30/23 (!) 148 kg (327 lb)   History of CHF with preserved ejection fraction  Recently underwent cardiac ablation at [facility].  Echo 10/2023: The left ventricular ejection fraction is 45-50% by visual estimation. Systolic function is mildly reduced.   Hold home Lasix due to hypovolemia/sepsis   monitor volume status

## 2025-05-01 LAB
ANION GAP SERPL CALCULATED.3IONS-SCNC: 6 MMOL/L (ref 4–13)
BUN SERPL-MCNC: 24 MG/DL (ref 5–25)
CALCIUM SERPL-MCNC: 9.1 MG/DL (ref 8.4–10.2)
CHLORIDE SERPL-SCNC: 105 MMOL/L (ref 96–108)
CO2 SERPL-SCNC: 28 MMOL/L (ref 21–32)
CREAT SERPL-MCNC: 1.1 MG/DL (ref 0.6–1.3)
ERYTHROCYTE [DISTWIDTH] IN BLOOD BY AUTOMATED COUNT: 14.2 % (ref 11.6–15.1)
GFR SERPL CREATININE-BSD FRML MDRD: 65 ML/MIN/1.73SQ M
GLUCOSE SERPL-MCNC: 167 MG/DL (ref 65–140)
HCT VFR BLD AUTO: 41.2 % (ref 36.5–49.3)
HGB BLD-MCNC: 13.1 G/DL (ref 12–17)
MAGNESIUM SERPL-MCNC: 2.6 MG/DL (ref 1.9–2.7)
MCH RBC QN AUTO: 30.1 PG (ref 26.8–34.3)
MCHC RBC AUTO-ENTMCNC: 31.8 G/DL (ref 31.4–37.4)
MCV RBC AUTO: 95 FL (ref 82–98)
MRSA NOSE QL CULT: NORMAL
PLATELET # BLD AUTO: 510 THOUSANDS/UL (ref 149–390)
PMV BLD AUTO: 9.2 FL (ref 8.9–12.7)
POTASSIUM SERPL-SCNC: 4.3 MMOL/L (ref 3.5–5.3)
RBC # BLD AUTO: 4.35 MILLION/UL (ref 3.88–5.62)
SODIUM SERPL-SCNC: 139 MMOL/L (ref 135–147)
WBC # BLD AUTO: 28.64 THOUSAND/UL (ref 4.31–10.16)

## 2025-05-01 PROCEDURE — 80048 BASIC METABOLIC PNL TOTAL CA: CPT | Performed by: FAMILY MEDICINE

## 2025-05-01 PROCEDURE — 94640 AIRWAY INHALATION TREATMENT: CPT

## 2025-05-01 PROCEDURE — 87070 CULTURE OTHR SPECIMN AEROBIC: CPT

## 2025-05-01 PROCEDURE — 85027 COMPLETE CBC AUTOMATED: CPT | Performed by: FAMILY MEDICINE

## 2025-05-01 PROCEDURE — 99232 SBSQ HOSP IP/OBS MODERATE 35: CPT | Performed by: FAMILY MEDICINE

## 2025-05-01 PROCEDURE — 94760 N-INVAS EAR/PLS OXIMETRY 1: CPT

## 2025-05-01 PROCEDURE — 83735 ASSAY OF MAGNESIUM: CPT | Performed by: FAMILY MEDICINE

## 2025-05-01 PROCEDURE — 87205 SMEAR GRAM STAIN: CPT

## 2025-05-01 RX ORDER — METHYLPREDNISOLONE SODIUM SUCCINATE 125 MG/2ML
80 INJECTION, POWDER, LYOPHILIZED, FOR SOLUTION INTRAMUSCULAR; INTRAVENOUS ONCE
Status: COMPLETED | OUTPATIENT
Start: 2025-05-01 | End: 2025-05-01

## 2025-05-01 RX ADMIN — Medication 400 MG: at 15:19

## 2025-05-01 RX ADMIN — CEFTRIAXONE 1000 MG: 1 INJECTION, SOLUTION INTRAVENOUS at 06:14

## 2025-05-01 RX ADMIN — BUDESONIDE INHALATION 0.5 MG: 0.5 SUSPENSION RESPIRATORY (INHALATION) at 08:48

## 2025-05-01 RX ADMIN — AZITHROMYCIN MONOHYDRATE 500 MG: 500 INJECTION, POWDER, LYOPHILIZED, FOR SOLUTION INTRAVENOUS at 08:43

## 2025-05-01 RX ADMIN — ATORVASTATIN CALCIUM 20 MG: 20 TABLET, FILM COATED ORAL at 18:00

## 2025-05-01 RX ADMIN — METHYLPREDNISOLONE SODIUM SUCCINATE 80 MG: 125 INJECTION, POWDER, FOR SOLUTION INTRAMUSCULAR; INTRAVENOUS at 15:19

## 2025-05-01 RX ADMIN — PANTOPRAZOLE SODIUM 40 MG: 40 TABLET, DELAYED RELEASE ORAL at 06:11

## 2025-05-01 RX ADMIN — ASPIRIN 81 MG CHEWABLE TABLET 81 MG: 81 TABLET CHEWABLE at 08:24

## 2025-05-01 RX ADMIN — RIVAROXABAN 20 MG: 20 TABLET, FILM COATED ORAL at 18:00

## 2025-05-01 RX ADMIN — FERROUS SULFATE TAB 325 MG (65 MG ELEMENTAL FE) 325 MG: 325 (65 FE) TAB at 07:52

## 2025-05-01 RX ADMIN — BUDESONIDE INHALATION 0.5 MG: 0.5 SUSPENSION RESPIRATORY (INHALATION) at 20:02

## 2025-05-01 RX ADMIN — IPRATROPIUM BROMIDE AND ALBUTEROL SULFATE 3 ML: 2.5; .5 SOLUTION RESPIRATORY (INHALATION) at 13:53

## 2025-05-01 RX ADMIN — IPRATROPIUM BROMIDE AND ALBUTEROL SULFATE 3 ML: 2.5; .5 SOLUTION RESPIRATORY (INHALATION) at 20:02

## 2025-05-01 RX ADMIN — Medication 400 MG: at 08:25

## 2025-05-01 RX ADMIN — IPRATROPIUM BROMIDE AND ALBUTEROL SULFATE 3 ML: 2.5; .5 SOLUTION RESPIRATORY (INHALATION) at 08:48

## 2025-05-01 RX ADMIN — Medication 400 MG: at 20:30

## 2025-05-01 RX ADMIN — AMIODARONE HYDROCHLORIDE 200 MG: 200 TABLET ORAL at 08:24

## 2025-05-01 NOTE — PLAN OF CARE
Problem: Potential for Falls  Goal: Patient will remain free of falls  Description: INTERVENTIONS:- Educate patient/family on patient safety including physical limitations- Instruct patient to call for assistance with activity - Consult OT/PT to assist with strengthening/mobility - Keep Call bell within reach- Keep bed low and locked with side rails adjusted as appropriate- Keep care items and personal belongings within reach- Initiate and maintain comfort rounds- Make Fall Risk Sign visible to staff- Offer Toileting every 2 Hours, in advance of need- Initiate/Maintain bed/ chair alarm- Obtain necessary fall risk management equipment: cane/ walker - Apply yellow socks and bracelet for high fall risk patients- Consider moving patient to room near nurses station  INTERVENTIONS:- Educate patient/family on patient safety including physical limitations- Instruct patient to call for assistance with activity - Consult OT/PT to assist with strengthening/mobility - Keep Call bell within reach- Keep bed low and locked with side rails adjusted as appropriate- Keep care items and personal belongings within reach- Initiate and maintain comfort rounds- Make Fall Risk Sign visible to staff- Offer Toileting every 2 Hours, in advance of need- Initiate/Maintain bed/ chair alarm- Obtain necessary fall risk management equipment: walker- Apply yellow socks and bracelet for high fall risk patients- Consider moving patient to room near nurses station  Outcome: Progressing     Problem: PAIN - ADULT  Goal: Verbalizes/displays adequate comfort level or baseline comfort level  Description: Interventions:- Encourage patient to monitor pain and request assistance- Assess pain using appropriate pain scale- Administer analgesics based on type and severity of pain and evaluate response- Implement non-pharmacological measures as appropriate and evaluate response- Consider cultural and social influences on pain and pain management- Notify  physician/advanced practitioner if interventions unsuccessful or patient reports new pain  Outcome: Progressing     Problem: INFECTION - ADULT  Goal: Absence or prevention of progression during hospitalization  Description: INTERVENTIONS:- Assess and monitor for signs and symptoms of infection- Monitor lab/diagnostic results- Monitor all insertion sites, i.e. indwelling lines, tubes, and drains- Monitor endotracheal if appropriate and nasal secretions for changes in amount and color- [place] appropriate cooling/warming therapies per order- Administer medications as ordered- Instruct and encourage patient and family to use good hand hygiene technique- Identify and instruct in appropriate isolation precautions for identified infection/condition  Outcome: Progressing  Goal: Absence of fever/infection during neutropenic period  Description: INTERVENTIONS:- Monitor WBC  Outcome: Progressing     Problem: SAFETY ADULT  Goal: Patient will remain free of falls  Description: INTERVENTIONS:- Educate patient/family on patient safety including physical limitations- Instruct patient to call for assistance with activity - Consult OT/PT to assist with strengthening/mobility - Keep Call bell within reach- Keep bed low and locked with side rails adjusted as appropriate- Keep care items and personal belongings within reach- Initiate and maintain comfort rounds- Make Fall Risk Sign visible to staff- Offer Toileting every 2 Hours, in advance of need- Initiate/Maintain bed/ chair alarm- Obtain necessary fall risk management equipment: cane/ walker - Apply yellow socks and bracelet for high fall risk patients- Consider moving patient to room near nurses station  INTERVENTIONS:- Educate patient/family on patient safety including physical limitations- Instruct patient to call for assistance with activity - Consult OT/PT to assist with strengthening/mobility - Keep Call bell within reach- Keep bed low and locked with side rails adjusted as  appropriate- Keep care items and personal belongings within reach- Initiate and maintain comfort rounds- Make Fall Risk Sign visible to staff- Offer Toileting every 2 Hours, in advance of need- Initiate/Maintain bed/ chair alarm- Obtain necessary fall risk management equipment: walker- Apply yellow socks and bracelet for high fall risk patients- Consider moving patient to room near nurses station  Outcome: Progressing  Goal: Maintain or return to baseline ADL function  Description: INTERVENTIONS:-  Assess patient's ability to carry out ADLs; assess patient's baseline for ADL function and identify physical deficits which impact ability to perform ADLs (bathing, care of mouth/teeth, toileting, grooming, dressing, etc.)- Assess/evaluate cause of self-care deficits - Assess range of motion- Assess patient's mobility; develop plan if impaired- Assess patient's need for assistive devices and provide as appropriate- Encourage maximum independence but intervene and supervise when necessary- Involve family in performance of ADLs- Assess for home care needs following discharge - Consider OT consult to assist with ADL evaluation and planning for discharge- Provide patient education as appropriate  Outcome: Progressing  Goal: Maintains/Returns to pre admission functional level  Description: INTERVENTIONS:- Perform AM-PAC 6 Click Basic Mobility/ Daily Activity assessment daily.- Set and communicate daily mobility goal to care team and patient/family/caregiver. - Collaborate with rehabilitation services on mobility goals if consulted- Perform Range of Motion 2 times a day.- Reposition patient every 2 hours.- Dangle patient 2 times a day- Stand patient 2 times a day- Ambulate patient 2 times a day- Out of bed to chair 2 times a day - Out of bed for meals 2 times a day- Out of bed for toileting- Record patient progress and toleration of activity level   Outcome: Progressing     Problem: DISCHARGE PLANNING  Goal: Discharge to home  or other facility with appropriate resources  Description: INTERVENTIONS:- Identify barriers to discharge w/patient and caregiver- Arrange for needed discharge resources and transportation as appropriate- Identify discharge learning needs (meds, wound care, etc.)- Arrange for interpretive services to assist at discharge as needed- Refer to Case Management Department for coordinating discharge planning if the patient needs post-hospital services based on physician/advanced practitioner order or complex needs related to functional status, cognitive ability, or social support system  Outcome: Progressing     Problem: Knowledge Deficit  Goal: Patient/family/caregiver demonstrates understanding of disease process, treatment plan, medications, and discharge instructions  Description: Complete learning assessment and assess knowledge base.Interventions:- Provide teaching at level of understanding- Provide teaching via preferred learning methods  Outcome: Progressing     Problem: RESPIRATORY - ADULT  Goal: Achieves optimal ventilation and oxygenation  Description: INTERVENTIONS:- Assess for changes in respiratory status- Assess for changes in mentation and behavior- Position to facilitate oxygenation and minimize respiratory effort- Oxygen administered by appropriate delivery if ordered- Initiate smoking cessation education as indicated- Encourage broncho-pulmonary hygiene including cough, deep breathe, Incentive Spirometry- Assess the need for suctioning and aspirate as needed- Assess and instruct to report SOB or any respiratory difficulty- Respiratory Therapy support as indicated  Outcome: Progressing     Problem: METABOLIC, FLUID AND ELECTROLYTES - ADULT  Goal: Electrolytes maintained within normal limits  Description: INTERVENTIONS:- Monitor labs and assess patient for signs and symptoms of electrolyte imbalances- Administer electrolyte replacement as ordered- Monitor response to electrolyte replacements, including  repeat lab results as appropriate- Instruct patient on fluid and nutrition as appropriate  Outcome: Progressing  Goal: Fluid balance maintained  Description: INTERVENTIONS:- Monitor labs - Monitor I/O and WT- Instruct patient on fluid and nutrition as appropriate- Assess for signs & symptoms of volume excess or deficit  Outcome: Progressing  Goal: Glucose maintained within target range  Description: INTERVENTIONS:- Monitor Blood Glucose as ordered- Assess for signs and symptoms of hyperglycemia and hypoglycemia- Administer ordered medications to maintain glucose within target range- Assess nutritional intake and initiate nutrition service referral as needed  Outcome: Progressing

## 2025-05-01 NOTE — ASSESSMENT & PLAN NOTE
Patient remains normotensive at this juncture  Initially, hypotensive in the setting of sepsis-index suspicion for septic shock and hypovolemia  Now resolved  Remain with diuretic therapy on hold with plan to reinitiate over the next 24 hours pending maintenance of blood pressure and volume status

## 2025-05-01 NOTE — NURSING NOTE
Pt ambulated into BR with O2 at 1LNC, Slightly QUINN that resolves with rest. Desats on 1L to 87% with activity. Expiratory wheezes noted. Increased to 2L until recovered.

## 2025-05-01 NOTE — PROGRESS NOTES
"Progress Note - Hospitalist   Name: Manjit Hernandez 74 y.o. male I MRN: [id number]  Unit/Bed#: -01 I Date of Admission: 4/30/2025   Date of Service: 5/1/2025 I Hospital Day: 1    Assessment & Plan  Septic shock (HCC)  Shock now resolved  On presentation (4/30) met sepsis criteria with tachycardia, tachypnea, leukocytosis-hypotension refractory to volume  Chest CT (4/30): \"New bilateral multi lobar infiltrates, right greater than left attributed to pneumonia. Advise noncontrast chest CT follow-up in 8 weeks after appropriate medical therapy. Pulmonary emphysema. Trace right basilar pleural effusion.\"  Chest x-ray showed bilateral peripheral pneumonia  COVID/flu negative    Index suspicion for pulmonary source-multifocal pneumonia-initially received cefepime, vancomycin, azithromycin  Given improvement (4/30) de-escalated to Rocephin Zithromax  Urine Legionella and strep antigens negative  Sputum culture if able    CAP (community acquired pneumonia)  Patient presented with dizziness, lightheadedness, and shortness of breath that started 4/29 PM.  As above  Urine strep pneumonia Legionella negative  (4/30) de-escalated to Rocephin Zithromax given improvement  Wean oxygen to off-today still requiring 1 L nasal cannula  Likely, etiology of acute hypoxemic respiratory failure and sepsis upon presentation  LEELEE (acute kidney injury) (HCC)  Resolved-likely prerenal in the setting of sepsis upon presentation  Lab Results   Component Value Date    CREATININE 1.10 05/01/2025    CREATININE 1.40 (H) 04/30/2025    CREATININE 1.53 (H) 04/30/2025    CREATININE 1.05 03/27/2025   Creatinine on admission noted to be 1.53  Baseline creatinine 0.8-1.0  Avoid nephrotoxic medications and hypotension    Acute respiratory failure with hypoxia (HCC)  Improving (5/1)-maintaining adequate saturation on 1 L  O2 saturation in the ED 83% on RA, placed on 5 L NC and at 89%  Oxygen titrated to 6 L NC at 92 to 96% (4/30)  Received " DuoNeb/steroids in the ED  Index of suspicion for multifocal pneumonia as source  Low index suspicion for COPD exacerbation given lack of wheezing/bronchospasm  Continue empiric antimicrobials  Continue home nebulizer treatments  Patient denies any home oxygen use, wean as able    May require ambulatory pulse oximetry prior to discharge  Tonight (5/1) will order nocturnal pulse oximetry  Essential (primary) hypertension  Patient remains normotensive at this juncture  Initially, hypotensive in the setting of sepsis-index suspicion for septic shock and hypovolemia  Now resolved  Remain with diuretic therapy on hold with plan to reinitiate over the next 24 hours pending maintenance of blood pressure and volume status  Chronic obstructive pulmonary disease with acute lower respiratory infection (HCC)  Noted history of COPD  Does not appear to be in exacerbation-without accessory respiratory muscle use or expiratory wheezing  Continue to monitor  Continue nebulized bronchodilators/home regimen  Received IV steroids in the ED    PAF (paroxysmal atrial fibrillation) (HCC)  Continue home Xarelto and bisoprolol  Rate controlled  EKG on admission normal sinus rhythm rate of 64  Patient notes recent cardiac ablation-follows with [facility] cardiology  Chronic condition/stable  Chronic diastolic congestive heart failure (HCC)  Wt Readings from Last 3 Encounters:   05/01/25 133 kg (292 lb 12.8 oz)   05/15/24 (!) 138 kg (303 lb 5.7 oz)   10/30/23 (!) 148 kg (327 lb)   History of CHF with preserved ejection fraction  Recently underwent cardiac ablation at [facility].  Echo 10/2023: The left ventricular ejection fraction is 45-50% by visual estimation. Systolic function is mildly reduced.   Hold home Lasix due to hypovolemia/sepsis   monitor volume status  History of pulmonary embolism  Noted history of PE s/p lung mass resection  Systemically anticoagulated  Chronic condition/stable    VTE Pharmacologic Prophylaxis: VTE Score: 14 High  "Risk (Score >/= 5) - Pharmacological DVT Prophylaxis Ordered: rivaroxaban (Xarelto). Sequential Compression Devices Ordered.    Mobility:   Basic Mobility Inpatient Raw Score: 21  JH-HLM Goal: 6: Walk 10 steps or more  JH-HLM Achieved: 7: Walk 25 feet or more  JH-HLM Goal achieved. Continue to encourage appropriate mobility.    Patient Centered Rounds: I performed bedside rounds with nursing staff today.   Discussions with Specialists or Other Care Team Provider: ICU team    Education and Discussions with Family / Patient:  Will call patient's wife-Erum.     Current Length of Stay: 1 day(s)  Current Patient Status: Inpatient   Certification Statement: The patient will continue to require additional inpatient hospital stay due to hypoxemic respiratory failure/pneumonia after sepsis  Discharge Plan: Anticipate discharge in 24-48 hrs to home with home services.    Code Status: Level 1 - Full Code    Subjective   The patient is examined at bedside.  States that he feels \"normal\".  He remains on 1 L nasal cannula which is a significant improvement from presentation.  Continues with cough however denies shortness of breath.  Denies chest pressure palpitations.    Objective :  Temp:  [96.2 °F (35.7 °C)-97.9 °F (36.6 °C)] 96.2 °F (35.7 °C)  HR:  [51-74] 74  BP: (111-142)/(53-63) 112/53  Resp:  [18-20] 18  SpO2:  [86 %-97 %] 93 %  O2 Device: Nasal cannula  Nasal Cannula O2 Flow Rate (L/min):  [1 L/min-2 L/min] 1 L/min    Body mass index is 38.63 kg/m².     Input and Output Summary (last 24 hours):     Intake/Output Summary (Last 24 hours) at 5/1/2025 1353  Last data filed at 5/1/2025 1100  Gross per 24 hour   Intake 1913 ml   Output 1250 ml   Net 663 ml       Physical Exam  Constitutional:       General: He is not in acute distress.     Appearance: He is not ill-appearing.   Cardiovascular:      Rate and Rhythm: Regular rhythm. Bradycardia present.      Pulses: Normal pulses.      Heart sounds: Normal heart sounds. No " murmur heard.  Pulmonary:      Effort: Pulmonary effort is normal. No respiratory distress.      Breath sounds: Decreased breath sounds present. No wheezing or rales.   Abdominal:      General: Bowel sounds are normal. There is no distension.      Palpations: Abdomen is soft.      Tenderness: There is no abdominal tenderness.   Musculoskeletal:         General: No swelling or tenderness.      Right lower leg: No edema.      Left lower leg: No edema.   Skin:     General: Skin is warm and dry.      Findings: No erythema or rash.   Neurological:      General: No focal deficit present.      Mental Status: He is alert. Mental status is at baseline.   Psychiatric:         Mood and Affect: Mood normal.       Lines/Drains:              Lab Results: I have reviewed the following results:   Results from last 7 days   Lab Units 05/01/25  0432 04/30/25  0411   WBC Thousand/uL 28.64* 29.53*   HEMOGLOBIN g/dL 13.1 13.2   HEMATOCRIT % 41.2 40.6   PLATELETS Thousands/uL 510* 483*   SEGS PCT %  --  92*   LYMPHO PCT %  --  3*   MONO PCT %  --  4   EOS PCT %  --  0     Results from last 7 days   Lab Units 05/01/25  0432 04/30/25  0611   SODIUM mmol/L 139 137   POTASSIUM mmol/L 4.3 4.1   CHLORIDE mmol/L 105 102   CO2 mmol/L 28 27   BUN mg/dL 24 28*   CREATININE mg/dL 1.10 1.40*   ANION GAP mmol/L 6 8   CALCIUM mg/dL 9.1 8.2*   ALBUMIN g/dL  --  3.3*   TOTAL BILIRUBIN mg/dL  --  1.95*   ALK PHOS U/L  --  59   ALT U/L  --  27   AST U/L  --  18   GLUCOSE RANDOM mg/dL 167* 167*     Results from last 7 days   Lab Units 04/30/25  0411   INR  3.35*             Results from last 7 days   Lab Units 04/30/25  0611 04/30/25  0411   LACTIC ACID mmol/L  --  1.6   PROCALCITONIN ng/ml 0.29* 0.24       Recent Cultures (last 7 days):   Results from last 7 days   Lab Units 04/30/25  0700 04/30/25  0417 04/30/25  0411   BLOOD CULTURE   --  No Growth at 24 hrs. No Growth at 24 hrs.   LEGIONELLA URINARY ANTIGEN  Negative  --   --        Imaging Results  Review: I reviewed radiology reports from this admission including: chest xray.  Other Study Results Review: EKG was reviewed.     Last 24 Hours Medication List:     Current Facility-Administered Medications:     acetaminophen (TYLENOL) tablet 650 mg, Q6H PRN    albuterol inhalation solution 2.5 mg, Q6H PRN    amiodarone tablet 200 mg, Daily    aspirin chewable tablet 81 mg, Daily    atorvastatin (LIPITOR) tablet 20 mg, After Dinner    azithromycin (ZITHROMAX) 500 mg in sodium chloride 0.9 % 250 mL IVPB, Q24H, Last Rate: 500 mg (05/01/25 0843)    benzonatate (TESSALON PERLES) capsule 100 mg, TID PRN    budesonide (PULMICORT) inhalation solution 0.5 mg, Q12H    calcium carbonate (TUMS) chewable tablet 1,000 mg, Daily PRN    cefTRIAXone (ROCEPHIN) IVPB (premix in dextrose) 1,000 mg 50 mL, Q24H, Last Rate: Stopped (05/01/25 0644)    ferrous sulfate tablet 325 mg, Daily With Breakfast    ipratropium-albuterol (DUO-NEB) 0.5-2.5 mg/3 mL inhalation solution 3 mL, Q6H While awake    magnesium Oxide (MAG-OX) tablet 400 mg, TID    methylPREDNISolone sodium succinate (Solu-MEDROL) injection 80 mg, Once    ondansetron (ZOFRAN) injection 4 mg, Q6H PRN    pantoprazole (PROTONIX) EC tablet 40 mg, Early Morning    rivaroxaban (XARELTO) tablet 20 mg, After Dinner    Administrative Statements   Today, Patient Was Seen By: Mansoor De La Torre, DO  I have spent a total time of 34 minutes in caring for this patient on the day of the visit/encounter including Risks and benefits of tx options, Patient and family education, and Risk factor reductions.    **Please Note: This note may have been constructed using a voice recognition system.**

## 2025-05-01 NOTE — PLAN OF CARE
Problem: Potential for Falls  Goal: Patient will remain free of falls  Description: INTERVENTIONS:- Educate patient/family on patient safety including physical limitations- Instruct patient to call for assistance with activity - Consult OT/PT to assist with strengthening/mobility - Keep Call bell within reach- Keep bed low and locked with side rails adjusted as appropriate- Keep care items and personal belongings within reach- Initiate and maintain comfort rounds- Make Fall Risk Sign visible to staff- Offer Toileting every 2 Hours, in advance of need- Initiate/Maintain bed/ chair alarm- Obtain necessary fall risk management equipment: cane/ walker - Apply yellow socks and bracelet for high fall risk patients- Consider moving patient to room near nurses station  INTERVENTIONS:- Educate patient/family on patient safety including physical limitations- Instruct patient to call for assistance with activity - Consult OT/PT to assist with strengthening/mobility - Keep Call bell within reach- Keep bed low and locked with side rails adjusted as appropriate- Keep care items and personal belongings within reach- Initiate and maintain comfort rounds- Make Fall Risk Sign visible to staff- Offer Toileting every 2 Hours, in advance of need- Initiate/Maintain bed/ chair alarm- Obtain necessary fall risk management equipment: walker- Apply yellow socks and bracelet for high fall risk patients- Consider moving patient to room near nurses station  Outcome: Progressing     Problem: PAIN - ADULT  Goal: Verbalizes/displays adequate comfort level or baseline comfort level  Description: Interventions:- Encourage patient to monitor pain and request assistance- Assess pain using appropriate pain scale- Administer analgesics based on type and severity of pain and evaluate response- Implement non-pharmacological measures as appropriate and evaluate response- Consider cultural and social influences on pain and pain management- Notify  physician/advanced practitioner if interventions unsuccessful or patient reports new pain  Outcome: Progressing     Problem: INFECTION - ADULT  Goal: Absence or prevention of progression during hospitalization  Description: INTERVENTIONS:- Assess and monitor for signs and symptoms of infection- Monitor lab/diagnostic results- Monitor all insertion sites, i.e. indwelling lines, tubes, and drains- Monitor endotracheal if appropriate and nasal secretions for changes in amount and color- [place] appropriate cooling/warming therapies per order- Administer medications as ordered- Instruct and encourage patient and family to use good hand hygiene technique- Identify and instruct in appropriate isolation precautions for identified infection/condition  Outcome: Progressing  Goal: Absence of fever/infection during neutropenic period  Description: INTERVENTIONS:- Monitor WBC  Outcome: Progressing     Problem: SAFETY ADULT  Goal: Patient will remain free of falls  Description: INTERVENTIONS:- Educate patient/family on patient safety including physical limitations- Instruct patient to call for assistance with activity - Consult OT/PT to assist with strengthening/mobility - Keep Call bell within reach- Keep bed low and locked with side rails adjusted as appropriate- Keep care items and personal belongings within reach- Initiate and maintain comfort rounds- Make Fall Risk Sign visible to staff- Offer Toileting every 2 Hours, in advance of need- Initiate/Maintain bed/ chair alarm- Obtain necessary fall risk management equipment: cane/ walker - Apply yellow socks and bracelet for high fall risk patients- Consider moving patient to room near nurses station  INTERVENTIONS:- Educate patient/family on patient safety including physical limitations- Instruct patient to call for assistance with activity - Consult OT/PT to assist with strengthening/mobility - Keep Call bell within reach- Keep bed low and locked with side rails adjusted as  appropriate- Keep care items and personal belongings within reach- Initiate and maintain comfort rounds- Make Fall Risk Sign visible to staff- Offer Toileting every 2 Hours, in advance of need- Initiate/Maintain bed/ chair alarm- Obtain necessary fall risk management equipment: walker- Apply yellow socks and bracelet for high fall risk patients- Consider moving patient to room near nurses station  Outcome: Progressing  Goal: Maintain or return to baseline ADL function  Description: INTERVENTIONS:-  Assess patient's ability to carry out ADLs; assess patient's baseline for ADL function and identify physical deficits which impact ability to perform ADLs (bathing, care of mouth/teeth, toileting, grooming, dressing, etc.)- Assess/evaluate cause of self-care deficits - Assess range of motion- Assess patient's mobility; develop plan if impaired- Assess patient's need for assistive devices and provide as appropriate- Encourage maximum independence but intervene and supervise when necessary- Involve family in performance of ADLs- Assess for home care needs following discharge - Consider OT consult to assist with ADL evaluation and planning for discharge- Provide patient education as appropriate  Outcome: Progressing  Goal: Maintains/Returns to pre admission functional level  Description: INTERVENTIONS:- Perform AM-PAC 6 Click Basic Mobility/ Daily Activity assessment daily.- Set and communicate daily mobility goal to care team and patient/family/caregiver. - Collaborate with rehabilitation services on mobility goals if consulted- Perform Range of Motion 2 times a day.- Reposition patient every 2 hours.- Dangle patient 2 times a day- Stand patient 2 times a day- Ambulate patient 2 times a day- Out of bed to chair 2 times a day - Out of bed for meals 2 times a day- Out of bed for toileting- Record patient progress and toleration of activity level   Outcome: Progressing     Problem: DISCHARGE PLANNING  Goal: Discharge to home  or other facility with appropriate resources  Description: INTERVENTIONS:- Identify barriers to discharge w/patient and caregiver- Arrange for needed discharge resources and transportation as appropriate- Identify discharge learning needs (meds, wound care, etc.)- Arrange for interpretive services to assist at discharge as needed- Refer to Case Management Department for coordinating discharge planning if the patient needs post-hospital services based on physician/advanced practitioner order or complex needs related to functional status, cognitive ability, or social support system  Outcome: Progressing     Problem: Knowledge Deficit  Goal: Patient/family/caregiver demonstrates understanding of disease process, treatment plan, medications, and discharge instructions  Description: Complete learning assessment and assess knowledge base.Interventions:- Provide teaching at level of understanding- Provide teaching via preferred learning methods  Outcome: Progressing     Problem: RESPIRATORY - ADULT  Goal: Achieves optimal ventilation and oxygenation  Description: INTERVENTIONS:- Assess for changes in respiratory status- Assess for changes in mentation and behavior- Position to facilitate oxygenation and minimize respiratory effort- Oxygen administered by appropriate delivery if ordered- Initiate smoking cessation education as indicated- Encourage broncho-pulmonary hygiene including cough, deep breathe, Incentive Spirometry- Assess the need for suctioning and aspirate as needed- Assess and instruct to report SOB or any respiratory difficulty- Respiratory Therapy support as indicated  Outcome: Progressing     Problem: METABOLIC, FLUID AND ELECTROLYTES - ADULT  Goal: Electrolytes maintained within normal limits  Description: INTERVENTIONS:- Monitor labs and assess patient for signs and symptoms of electrolyte imbalances- Administer electrolyte replacement as ordered- Monitor response to electrolyte replacements, including  repeat lab results as appropriate- Instruct patient on fluid and nutrition as appropriate  Outcome: Progressing  Goal: Fluid balance maintained  Description: INTERVENTIONS:- Monitor labs - Monitor I/O and WT- Instruct patient on fluid and nutrition as appropriate- Assess for signs & symptoms of volume excess or deficit  Outcome: Progressing  Goal: Glucose maintained within target range  Description: INTERVENTIONS:- Monitor Blood Glucose as ordered- Assess for signs and symptoms of hyperglycemia and hypoglycemia- Administer ordered medications to maintain glucose within target range- Assess nutritional intake and initiate nutrition service referral as needed  Outcome: Progressing

## 2025-05-01 NOTE — RESPIRATORY THERAPY NOTE
RT Protocol Note  Manjit Hernandez 74 y.o. male MRN: [id number]  Unit/Bed#: -01 Encounter: [id number]    Assessment    Principal Problem:    Septic shock (HCC)  Active Problems:    History of pulmonary embolism    Essential (primary) hypertension    Chronic obstructive pulmonary disease with acute lower respiratory infection (HCC)    PAF (paroxysmal atrial fibrillation) (HCC)    Chronic diastolic congestive heart failure (HCC)    CAP (community acquired pneumonia)    LEELEE (acute kidney injury) (HCC)    Acute respiratory failure with hypoxia (HCC)      Home Pulmonary Medications:         Past Medical History:   Diagnosis Date    Arthritis     (L) hip    Asthma     Atrial fibrillation (HCC)     Cataract     PHIL    CHF (congestive heart failure) (HCC)     COPD (chronic obstructive pulmonary disease) (HCC)     Hernia of abdominal wall     Hip joint pain     (L)    [place]hard of hearing[place]     phil hearing aides    Hypertension     Lung cancer (HCC)     Macular degeneration     phil    Psoriasis     elbows and ankles- small  red open areas left ankle    Pulmonary embolism (HCC)     Pulmonary emphysema (HCC)     Wears glasses      Social History     Socioeconomic History    Marital status: /Civil Union     Spouse name: None    Number of children: None    Years of education: None    Highest education level: None   Occupational History    None   Tobacco Use    Smoking status: Former     Current packs/day: 0.00     Average packs/day: 2.0 packs/day for 40.0 years (80.0 ttl pk-yrs)     Types: Cigarettes     Start date: 1973     Quit date: 2013     Years since quittin.2     Passive exposure: Past    Smokeless tobacco: Never   Vaping Use    Vaping status: Never Used   Substance and Sexual Activity    Alcohol use: Not Currently     Comment: 1x year    Drug use: No    Sexual activity: None   Other Topics Concern    None   Social History Narrative    None     Social Drivers of Health     Financial Resource  "Strain: Not on file   Food Insecurity: No Food Insecurity (4/30/2025)    Hunger Vital Sign     Worried About Running Out of Food in the Last Year: Never true     Ran Out of Food in the Last Year: Never true   Transportation Needs: No Transportation Needs (4/30/2025)    PRAPARE - Transportation     Lack of Transportation (Medical): No     Lack of Transportation (Non-Medical): No   Physical Activity: Not on file   Stress: Not on file   Social Connections: Unknown (6/18/2024)    Received from [employer]     How often do you feel lonely or isolated from those around you? (Adult - for ages 18 years and over): Not on file   Intimate Partner Violence: Unknown (4/30/2025)    Nursing IPS     Feels Physically and Emotionally Safe: Not on file     Physically Hurt by Someone: Not on file     Humiliated or Emotionally Abused by Someone: Not on file     Physically Hurt by Someone: No     Hurt or Threatened by Someone: No   Housing Stability: Low Risk  (4/30/2025)    Housing Stability Vital Sign     Unable to Pay for Housing in the Last Year: No     Number of Times Moved in the Last Year: 0     Homeless in the Last Year: No       Subjective         Objective    Physical Exam:   Assessment Type: During-treatment  General Appearance: Awake, Alert  Respiratory Pattern: Normal  Chest Assessment: Chest expansion symmetrical  Bilateral Breath Sounds: Clear  O2 Device: 3L    Vitals:  Blood pressure 114/55, pulse (!) 51, temperature 97.9 °F (36.6 °C), temperature source Axillary, resp. rate 18, height 6' 1\" (1.854 m), weight 135 kg (296 lb 11.8 oz), SpO2 94%.          Imaging and other studies: Results Review Statement: No pertinent imaging studies reviewed.    O2 Device: 3L     Plan    Respiratory Plan: No distress/Pulmonary history        Resp Comments: Pt assessed on 2lpm NC, respiratory pattern unlabored, Sp02 95%, BS diminished and clear, scheduled tx provided.   "

## 2025-05-02 VITALS
SYSTOLIC BLOOD PRESSURE: 164 MMHG | RESPIRATION RATE: 18 BRPM | HEART RATE: 59 BPM | OXYGEN SATURATION: 93 % | BODY MASS INDEX: 39.07 KG/M2 | HEIGHT: 73 IN | WEIGHT: 294.8 LBS | TEMPERATURE: 97.5 F | DIASTOLIC BLOOD PRESSURE: 75 MMHG

## 2025-05-02 LAB
ANION GAP SERPL CALCULATED.3IONS-SCNC: 7 MMOL/L (ref 4–13)
BASOPHILS # BLD AUTO: 0.04 THOUSANDS/ÂΜL (ref 0–0.1)
BASOPHILS NFR BLD AUTO: 0 % (ref 0–1)
BUN SERPL-MCNC: 26 MG/DL (ref 5–25)
CALCIUM SERPL-MCNC: 8.7 MG/DL (ref 8.4–10.2)
CHLORIDE SERPL-SCNC: 103 MMOL/L (ref 96–108)
CO2 SERPL-SCNC: 27 MMOL/L (ref 21–32)
CREAT SERPL-MCNC: 1.06 MG/DL (ref 0.6–1.3)
DME PARACHUTE DELIVERY DATE ACTUAL: NORMAL
DME PARACHUTE DELIVERY DATE EXPECTED: NORMAL
DME PARACHUTE DELIVERY DATE REQUESTED: NORMAL
DME PARACHUTE DELIVERY NOTE: NORMAL
DME PARACHUTE ITEM DESCRIPTION: NORMAL
DME PARACHUTE ORDER STATUS: NORMAL
DME PARACHUTE SUPPLIER NAME: NORMAL
DME PARACHUTE SUPPLIER PHONE: NORMAL
EOSINOPHIL # BLD AUTO: 0 THOUSAND/ÂΜL (ref 0–0.61)
EOSINOPHIL NFR BLD AUTO: 0 % (ref 0–6)
ERYTHROCYTE [DISTWIDTH] IN BLOOD BY AUTOMATED COUNT: 14.3 % (ref 11.6–15.1)
GFR SERPL CREATININE-BSD FRML MDRD: 68 ML/MIN/1.73SQ M
GLUCOSE SERPL-MCNC: 214 MG/DL (ref 65–140)
HCT VFR BLD AUTO: 38.8 % (ref 36.5–49.3)
HGB BLD-MCNC: 12.5 G/DL (ref 12–17)
IMM GRANULOCYTES # BLD AUTO: 0.31 THOUSAND/UL (ref 0–0.2)
IMM GRANULOCYTES NFR BLD AUTO: 1 % (ref 0–2)
LYMPHOCYTES # BLD AUTO: 0.62 THOUSANDS/ÂΜL (ref 0.6–4.47)
LYMPHOCYTES NFR BLD AUTO: 3 % (ref 14–44)
MCH RBC QN AUTO: 30 PG (ref 26.8–34.3)
MCHC RBC AUTO-ENTMCNC: 32.2 G/DL (ref 31.4–37.4)
MCV RBC AUTO: 93 FL (ref 82–98)
MONOCYTES # BLD AUTO: 0.61 THOUSAND/ÂΜL (ref 0.17–1.22)
MONOCYTES NFR BLD AUTO: 3 % (ref 4–12)
NEUTROPHILS # BLD AUTO: 21.89 THOUSANDS/ÂΜL (ref 1.85–7.62)
NEUTS SEG NFR BLD AUTO: 93 % (ref 43–75)
NRBC BLD AUTO-RTO: 0 /100 WBCS
PLATELET # BLD AUTO: 516 THOUSANDS/UL (ref 149–390)
PLATELET BLD QL SMEAR: ABNORMAL
PMV BLD AUTO: 9.2 FL (ref 8.9–12.7)
POTASSIUM SERPL-SCNC: 5.4 MMOL/L (ref 3.5–5.3)
RBC # BLD AUTO: 4.17 MILLION/UL (ref 3.88–5.62)
RBC MORPH BLD: NORMAL
SODIUM SERPL-SCNC: 137 MMOL/L (ref 135–147)
WBC # BLD AUTO: 23.47 THOUSAND/UL (ref 4.31–10.16)

## 2025-05-02 PROCEDURE — 94760 N-INVAS EAR/PLS OXIMETRY 1: CPT

## 2025-05-02 PROCEDURE — 85025 COMPLETE CBC W/AUTO DIFF WBC: CPT

## 2025-05-02 PROCEDURE — 94640 AIRWAY INHALATION TREATMENT: CPT

## 2025-05-02 PROCEDURE — 99239 HOSP IP/OBS DSCHRG MGMT >30: CPT | Performed by: FAMILY MEDICINE

## 2025-05-02 PROCEDURE — 80048 BASIC METABOLIC PNL TOTAL CA: CPT

## 2025-05-02 PROCEDURE — 94762 N-INVAS EAR/PLS OXIMTRY CONT: CPT

## 2025-05-02 PROCEDURE — 94761 N-INVAS EAR/PLS OXIMETRY MLT: CPT

## 2025-05-02 RX ORDER — BENZONATATE 100 MG/1
100 CAPSULE ORAL 3 TIMES DAILY PRN
Qty: 20 CAPSULE | Refills: 0 | Status: SHIPPED | OUTPATIENT
Start: 2025-05-02

## 2025-05-02 RX ORDER — LEVOFLOXACIN 750 MG/1
750 TABLET, FILM COATED ORAL EVERY 24 HOURS
Qty: 5 TABLET | Refills: 0 | Status: SHIPPED | OUTPATIENT
Start: 2025-05-02 | End: 2025-05-07

## 2025-05-02 RX ADMIN — BUDESONIDE INHALATION 0.5 MG: 0.5 SUSPENSION RESPIRATORY (INHALATION) at 08:50

## 2025-05-02 RX ADMIN — AZITHROMYCIN MONOHYDRATE 500 MG: 500 INJECTION, POWDER, LYOPHILIZED, FOR SOLUTION INTRAVENOUS at 09:44

## 2025-05-02 RX ADMIN — AMIODARONE HYDROCHLORIDE 200 MG: 200 TABLET ORAL at 08:20

## 2025-05-02 RX ADMIN — PANTOPRAZOLE SODIUM 40 MG: 40 TABLET, DELAYED RELEASE ORAL at 05:06

## 2025-05-02 RX ADMIN — FERROUS SULFATE TAB 325 MG (65 MG ELEMENTAL FE) 325 MG: 325 (65 FE) TAB at 08:20

## 2025-05-02 RX ADMIN — IPRATROPIUM BROMIDE AND ALBUTEROL SULFATE 3 ML: 2.5; .5 SOLUTION RESPIRATORY (INHALATION) at 08:50

## 2025-05-02 RX ADMIN — Medication 400 MG: at 08:20

## 2025-05-02 RX ADMIN — CEFTRIAXONE 1000 MG: 1 INJECTION, SOLUTION INTRAVENOUS at 06:44

## 2025-05-02 RX ADMIN — ASPIRIN 81 MG CHEWABLE TABLET 81 MG: 81 TABLET CHEWABLE at 08:20

## 2025-05-02 NOTE — ASSESSMENT & PLAN NOTE
Continue home Xarelto and bisoprolol  Rate controlled  EKG on admission normal sinus rhythm rate of 64  Patient notes recent cardiac ablation-follows with [facility] cardiology  Chronic condition/stable

## 2025-05-02 NOTE — ASSESSMENT & PLAN NOTE
Patient presented (4/30) with dizziness, lightheadedness, and shortness of breath that started 4/29 PM.  As above  Urine strep pneumonia Legionella negative  (4/30) de-escalated to Rocephin Zithromax given improvement  Likely, etiology of acute hypoxemic respiratory failure and sepsis upon presentation      Will complete oral course of antimicrobials upon discharge

## 2025-05-02 NOTE — PLAN OF CARE
Problem: Potential for Falls  Goal: Patient will remain free of falls  Description: INTERVENTIONS:- Educate patient/family on patient safety including physical limitations- Instruct patient to call for assistance with activity - Consult OT/PT to assist with strengthening/mobility - Keep Call bell within reach- Keep bed low and locked with side rails adjusted as appropriate- Keep care items and personal belongings within reach- Initiate and maintain comfort rounds- Make Fall Risk Sign visible to staff- Offer Toileting every 2 Hours, in advance of need- Initiate/Maintain bed/ chair alarm- Obtain necessary fall risk management equipment: cane/ walker - Apply yellow socks and bracelet for high fall risk patients- Consider moving patient to room near nurses station  INTERVENTIONS:- Educate patient/family on patient safety including physical limitations- Instruct patient to call for assistance with activity - Consult OT/PT to assist with strengthening/mobility - Keep Call bell within reach- Keep bed low and locked with side rails adjusted as appropriate- Keep care items and personal belongings within reach- Initiate and maintain comfort rounds- Make Fall Risk Sign visible to staff- Offer Toileting every 2 Hours, in advance of need- Initiate/Maintain bed/ chair alarm- Obtain necessary fall risk management equipment: walker- Apply yellow socks and bracelet for high fall risk patients- Consider moving patient to room near nurses station  Outcome: Progressing     Problem: PAIN - ADULT  Goal: Verbalizes/displays adequate comfort level or baseline comfort level  Description: Interventions:- Encourage patient to monitor pain and request assistance- Assess pain using appropriate pain scale- Administer analgesics based on type and severity of pain and evaluate response- Implement non-pharmacological measures as appropriate and evaluate response- Consider cultural and social influences on pain and pain management- Notify  physician/advanced practitioner if interventions unsuccessful or patient reports new pain  Outcome: Progressing     Problem: INFECTION - ADULT  Goal: Absence or prevention of progression during hospitalization  Description: INTERVENTIONS:- Assess and monitor for signs and symptoms of infection- Monitor lab/diagnostic results- Monitor all insertion sites, i.e. indwelling lines, tubes, and drains- Monitor endotracheal if appropriate and nasal secretions for changes in amount and color- [place] appropriate cooling/warming therapies per order- Administer medications as ordered- Instruct and encourage patient and family to use good hand hygiene technique- Identify and instruct in appropriate isolation precautions for identified infection/condition  Outcome: Progressing  Goal: Absence of fever/infection during neutropenic period  Description: INTERVENTIONS:- Monitor WBC  Outcome: Progressing     Problem: SAFETY ADULT  Goal: Patient will remain free of falls  Description: INTERVENTIONS:- Educate patient/family on patient safety including physical limitations- Instruct patient to call for assistance with activity - Consult OT/PT to assist with strengthening/mobility - Keep Call bell within reach- Keep bed low and locked with side rails adjusted as appropriate- Keep care items and personal belongings within reach- Initiate and maintain comfort rounds- Make Fall Risk Sign visible to staff- Offer Toileting every 2 Hours, in advance of need- Initiate/Maintain bed/ chair alarm- Obtain necessary fall risk management equipment: cane/ walker - Apply yellow socks and bracelet for high fall risk patients- Consider moving patient to room near nurses station  INTERVENTIONS:- Educate patient/family on patient safety including physical limitations- Instruct patient to call for assistance with activity - Consult OT/PT to assist with strengthening/mobility - Keep Call bell within reach- Keep bed low and locked with side rails adjusted as  appropriate- Keep care items and personal belongings within reach- Initiate and maintain comfort rounds- Make Fall Risk Sign visible to staff- Offer Toileting every 2 Hours, in advance of need- Initiate/Maintain bed/ chair alarm- Obtain necessary fall risk management equipment: walker- Apply yellow socks and bracelet for high fall risk patients- Consider moving patient to room near nurses station  Outcome: Progressing  Goal: Maintain or return to baseline ADL function  Description: INTERVENTIONS:-  Assess patient's ability to carry out ADLs; assess patient's baseline for ADL function and identify physical deficits which impact ability to perform ADLs (bathing, care of mouth/teeth, toileting, grooming, dressing, etc.)- Assess/evaluate cause of self-care deficits - Assess range of motion- Assess patient's mobility; develop plan if impaired- Assess patient's need for assistive devices and provide as appropriate- Encourage maximum independence but intervene and supervise when necessary- Involve family in performance of ADLs- Assess for home care needs following discharge - Consider OT consult to assist with ADL evaluation and planning for discharge- Provide patient education as appropriate  Outcome: Progressing  Goal: Maintains/Returns to pre admission functional level  Description: INTERVENTIONS:- Perform AM-PAC 6 Click Basic Mobility/ Daily Activity assessment daily.- Set and communicate daily mobility goal to care team and patient/family/caregiver. - Collaborate with rehabilitation services on mobility goals if consulted- Perform Range of Motion 2 times a day.- Reposition patient every 2 hours.- Dangle patient 2 times a day- Stand patient 2 times a day- Ambulate patient 2 times a day- Out of bed to chair 2 times a day - Out of bed for meals 2 times a day- Out of bed for toileting- Record patient progress and toleration of activity level   Outcome: Progressing     Problem: DISCHARGE PLANNING  Goal: Discharge to home  or other facility with appropriate resources  Description: INTERVENTIONS:- Identify barriers to discharge w/patient and caregiver- Arrange for needed discharge resources and transportation as appropriate- Identify discharge learning needs (meds, wound care, etc.)- Arrange for interpretive services to assist at discharge as needed- Refer to Case Management Department for coordinating discharge planning if the patient needs post-hospital services based on physician/advanced practitioner order or complex needs related to functional status, cognitive ability, or social support system  Outcome: Progressing     Problem: Knowledge Deficit  Goal: Patient/family/caregiver demonstrates understanding of disease process, treatment plan, medications, and discharge instructions  Description: Complete learning assessment and assess knowledge base.Interventions:- Provide teaching at level of understanding- Provide teaching via preferred learning methods  Outcome: Progressing     Problem: RESPIRATORY - ADULT  Goal: Achieves optimal ventilation and oxygenation  Description: INTERVENTIONS:- Assess for changes in respiratory status- Assess for changes in mentation and behavior- Position to facilitate oxygenation and minimize respiratory effort- Oxygen administered by appropriate delivery if ordered- Initiate smoking cessation education as indicated- Encourage broncho-pulmonary hygiene including cough, deep breathe, Incentive Spirometry- Assess the need for suctioning and aspirate as needed- Assess and instruct to report SOB or any respiratory difficulty- Respiratory Therapy support as indicated  Outcome: Progressing     Problem: METABOLIC, FLUID AND ELECTROLYTES - ADULT  Goal: Electrolytes maintained within normal limits  Description: INTERVENTIONS:- Monitor labs and assess patient for signs and symptoms of electrolyte imbalances- Administer electrolyte replacement as ordered- Monitor response to electrolyte replacements, including  repeat lab results as appropriate- Instruct patient on fluid and nutrition as appropriate  Outcome: Progressing  Goal: Fluid balance maintained  Description: INTERVENTIONS:- Monitor labs - Monitor I/O and WT- Instruct patient on fluid and nutrition as appropriate- Assess for signs & symptoms of volume excess or deficit  Outcome: Progressing  Goal: Glucose maintained within target range  Description: INTERVENTIONS:- Monitor Blood Glucose as ordered- Assess for signs and symptoms of hyperglycemia and hypoglycemia- Administer ordered medications to maintain glucose within target range- Assess nutritional intake and initiate nutrition service referral as needed  Outcome: Progressing

## 2025-05-02 NOTE — RESPIRATORY THERAPY NOTE
Home Oxygen Qualifying Test     Patient name: Manjit Hernandez        : 1950   Date of Test:  May 2, 2025  Diagnosis:    Home Oxygen Test:    **Medicare Guidelines require item(s) 1-5 on all ambulatory patients or 1 and 2 on non-ambulatory patients.    1. Baseline SPO2 on Room Air at rest 93 %   If <= 88% on Room Air add O2 via NC to obtain SpO2 >=88%. If LPM needed, document LPM NA needed to reach =>88%    SPO2 during exertion on Room Air 85  %  During exertion monitor SPO2. If SPO2 increases >=89%, do not add supplemental oxygen    SPO2 on Oxygen at Rest NA % at NA LPM    SPO2 during exertion on Oxygen 90 % at 6 LPM    Test performed during exertion activity.      [x]  Supplemental Home Oxygen is indicated.    []  Client does not qualify for home oxygen.    Respiratory Additional Notes- Pt will require 6 L NC for exertion and RA at rest    Judith Hull, RT

## 2025-05-02 NOTE — PROGRESS NOTES
Patient:    MRN:  [id number]    Aidin Request ID:  [id number]    Level of care reserved:  Home Health Agency    Partner Reserved:  [facility] [place], [place] [id number] [phone]    Clinical needs requested:  skilled nursing, physical therapy    Geography searched:  [id number]    Start of Service:    Request sent:  10:14am EDT on 5/2/2025 by Micheline Malone    Partner reserved:  1:18pm EDT on 5/2/2025 by Micheline Malone    Choice list shared:  11:22am EDT on 5/2/2025 by Micheline Malone

## 2025-05-02 NOTE — ASSESSMENT & PLAN NOTE
Resolved-likely prerenal in the setting of sepsis upon presentation  Lab Results   Component Value Date    CREATININE 1.06 05/02/2025    CREATININE 1.10 05/01/2025    CREATININE 1.40 (H) 04/30/2025    CREATININE 1.53 (H) 04/30/2025   Creatinine on admission noted to be 1.53  Baseline creatinine 0.8-1.0  Avoid nephrotoxic medications and hypotension

## 2025-05-02 NOTE — ASSESSMENT & PLAN NOTE
Patient remains normotensive at this juncture  Initially, hypotensive in the setting of sepsis-index suspicion for septic shock and hypovolemia  Now resolved

## 2025-05-02 NOTE — ASSESSMENT & PLAN NOTE
Wt Readings from Last 3 Encounters:   05/02/25 134 kg (294 lb 12.8 oz)   05/15/24 (!) 138 kg (303 lb 5.7 oz)   10/30/23 (!) 148 kg (327 lb)   History of CHF with preserved ejection fraction  Recently underwent cardiac ablation at [facility].  Echo 10/2023: The left ventricular ejection fraction is 45-50% by visual estimation. Systolic function is mildly reduced.   Held home Lasix due to hypovolemia/sepsis   monitor volume status-reinitiate diuretic therapy upon discharge

## 2025-05-02 NOTE — CASE MANAGEMENT
Case Management Discharge Planning Note    Patient name Manjit Hernandez  Location /-01 MRN [id number]  : 1950 Date 2025       Current Admission Date: 2025  Current Admission Diagnosis:Septic shock ([facility])   Patient Active Problem List    Diagnosis Date Noted Date Diagnosed    LEELEE (acute kidney injury) ([facility]) 2025     Acute respiratory failure with hypoxia ([facility]) 2025     Heart valve disease 2025     Acute CHF (congestive heart failure) ([facility]) 2025     Elevated troponin level 2025     Right arm pain 2024     CAP (community acquired pneumonia) 2024     History of total left knee replacement (TKR) 2024     Septic shock ([facility]) 2024     Anemia 2024     Hyperbilirubinemia 2024     History of COPD 2024     Chronic diastolic congestive heart failure ([facility]) 10/31/2023     Lung mass 10/29/2023     COVID 10/29/2023     PAF (paroxysmal atrial fibrillation) ([facility]) 10/29/2023     Hypomagnesemia 2023     Atherosclerotic heart disease of native coronary artery without angina pectoris 2023     Morbid obesity ([facility]) 2021     Chronic bronchitis ([facility]) 2021     Diabetes mellitus ([facility]) 03/10/2021     Mixed simple and mucopurulent chronic bronchitis ([facility]) 2019     Hypoxemia requiring supplemental oxygen 2019     History of pulmonary embolism 2019     Malignant neoplasm of lower lobe, right bronchus or lung ([facility]) 2019     Pulmonary embolism ([facility]) 2019     Coronary atherosclerosis due to calcified coronary lesion 2018     Asthma without status asthmaticus 2016     Essential (primary) hypertension 2016     Chronic obstructive pulmonary disease with acute lower respiratory infection (HCC) 2012     Hyperlipidemia 2010     Tobacco user 2010       LOS (days): 2  Geometric Mean LOS (GMLOS) (days): 4.9  Days to GMLOS:2.7     OBJECTIVE:  Risk of Unplanned  Readmission Score: 22.77         Current admission status: Inpatient   Preferred Pharmacy:   [facility] [address] [place] [address] [place] - [address] [facility]  [address] [facility] [address]  Phone: [phone] Fax: [phone]    Primary Care Provider: Vicki Cota DO    Primary Insurance: MEDICARE  Secondary Insurance: [employer]    DISCHARGE DETAILS:    Discharge planning discussed with:: patietn and spouse  Freedom of Choice: Yes  Comments - Freedom of Choice: 1. Discussed needing home 02, they do have a pulse oximeter, patient was on 02 in the past. Reviewed recommendation is for 2 liters at night, and 6 liters with activity and pt doesnot qualify for home POC at this time.   No prefence on DME company. 2) Discussed HHC and they are agreeable- only choice is [facility] not agreeable for a blanket referral, as they had in the past.  CM contacted family/caregiver?: Yes  Were Treatment Team discharge recommendations reviewed with patient/caregiver?: Yes  Did patient/caregiver verbalize understanding of patient care needs?: Yes       Contacts  Patient Contacts: Erum Hernandez,  Relationship to Patient:: Family  Contact Method: In Person  Reason/Outcome: Discharge Planning    Requested Home Health Care         Is the patient interested in HHC at discharge?: Yes  Home Health Discipline requested:: Physical Therapy, Nursing  Home Health Agency Name:: [facility]  HHA External Referral Reason (only applicable if external HHA name selected): Patient refused suggestion for recommended provider  Home Health Follow-Up Provider:: PCP  Home Health Services Needed:: Evaluate Functional Status and Safety, Oxygen Via Nasal Cannula  Oxygen LPM Ordered (if applicable based on home health services needed):: 6 LPM (On exertion)  Homebound Criteria Met:: Immunosuppressed  Supporting Clincal Findings:: Requires Oxygen, Limited Endurance    DME Referral Provided  Referral made for DME?: Yes  DME referral  completed for the following items:: Home Oxygen concentrator, Portable Oxygen tanks  DME Supplier Name:: [employer]    [place] of choice was provided in regards to needing a home medical equipment company, pt does not have preference. Pt is aware that we frequently utilized [employer] [facility] as we have a working relationship with this company. Patients choice is to use [employer]..     Other Referral/Resources/Interventions Provided:  Interventions: DME, C      [facility] has accepted for SOC on 5/5.  [employer] order was placed, will deliver 2 tanks of 02 for dc.     1150- 2 tanks were delivered to the bedside, delivery slips signed. Used teach back with patient/ spouse on turning on/off and regulating the 02. Provided [employer] the cell phone to use for home delivery.  They are aware [facility] is set up for 5/5.

## 2025-05-02 NOTE — ASSESSMENT & PLAN NOTE
"Shock now resolved  On presentation (4/30) met sepsis criteria with tachycardia, tachypnea, leukocytosis-hypotension refractory to volume  Chest CT (4/30): \"New bilateral multi lobar infiltrates, right greater than left attributed to pneumonia. Advise noncontrast chest CT follow-up in 8 weeks after appropriate medical therapy. Pulmonary emphysema. Trace right basilar pleural effusion.\"  Chest x-ray showed bilateral peripheral pneumonia  COVID/flu negative    Index suspicion for pulmonary source-multifocal pneumonia-initially received cefepime, vancomycin, azithromycin  Given improvement (4/30) de-escalated to Rocephin Zithromax  Urine Legionella and strep antigens negative      Today (5/2) patient is hemodynamically stable in absence of SIRS criteria-she will be set up with home oxygen and oral antibiotics to complete empiric course for pneumonia  He will discharge to home    "

## 2025-05-02 NOTE — RESPIRATORY THERAPY NOTE
RT Ventilator Management Note  Manjit Hernandez 74 y.o. male MRN: [id number]  Unit/Bed#: -01 Encounter: [id number]      Daily Screen    No data found in the last 10 encounters.           Physical Exam:   Assessment Type: During-treatment  General Appearance: Awake, Alert  Respiratory Pattern: Normal      Resp Comments: Pt started overnight pulse ox study on RA but required 1lpm of 02 which was applied at 01:36.

## 2025-05-02 NOTE — DISCHARGE INSTR - OTHER ORDERS
Your goal pulse oximetry is 88 - 92%.  If your pulse ox is <88 - rest for 5 minutes and take deep breaths through your nose with the oxygen in place.  Make sure your finger is warm (cold fingers will give falsely low readings).  After 5 minutes, recheck your pulse ox.  If your pulse ox continues to be below < 88% and you feel short of breath, rest, breathe through your nose and call your primary care physician or pulmonologist 24/7 (if after office hours the answering service will contact the on call physician who will call you back).  If you continue to feel excessively short of breath (much more than your normal breathing pattern), consider further evaluation in the emergency department - remember that a mask must be worn to enter any [facility] Emergency Department.  If your pulse ox continues to be below < 88% and you do not feel short of breath increase your oxygen flow by 1 liter at a time to achieve a reading between 88 and 92%.  If you are needing more than 2 liters higher than your normal flow for more than a few hours call your primary care physician or pulmonologist, even if you are not feeling short of breath.  If your pulse ox is >92% it is safe to turn down oxygen by 1 liter at a time to achieve a reading of 88-92%.

## 2025-05-02 NOTE — ASSESSMENT & PLAN NOTE
Noted history of COPD  Does not appear to be in exacerbation-without accessory respiratory muscle use or expiratory wheezing  Continue nebulized bronchodilators/home regimen  Received IV steroids in the ED  No need for steroid therapy upon discharge     No

## 2025-05-02 NOTE — PLAN OF CARE
Problem: Potential for Falls  Goal: Patient will remain free of falls  Description: INTERVENTIONS:- Educate patient/family on patient safety including physical limitations- Instruct patient to call for assistance with activity - Consult OT/PT to assist with strengthening/mobility - Keep Call bell within reach- Keep bed low and locked with side rails adjusted as appropriate- Keep care items and personal belongings within reach- Initiate and maintain comfort rounds- Make Fall Risk Sign visible to staff- Offer Toileting every 2 Hours, in advance of need- Initiate/Maintain bed/ chair alarm- Obtain necessary fall risk management equipment: cane/ walker - Apply yellow socks and bracelet for high fall risk patients- Consider moving patient to room near nurses station  INTERVENTIONS:- Educate patient/family on patient safety including physical limitations- Instruct patient to call for assistance with activity - Consult OT/PT to assist with strengthening/mobility - Keep Call bell within reach- Keep bed low and locked with side rails adjusted as appropriate- Keep care items and personal belongings within reach- Initiate and maintain comfort rounds- Make Fall Risk Sign visible to staff- Offer Toileting every 2 Hours, in advance of need- Initiate/Maintain bed/ chair alarm- Obtain necessary fall risk management equipment: walker- Apply yellow socks and bracelet for high fall risk patients- Consider moving patient to room near nurses station  Outcome: Adequate for Discharge     Problem: PAIN - ADULT  Goal: Verbalizes/displays adequate comfort level or baseline comfort level  Description: Interventions:- Encourage patient to monitor pain and request assistance- Assess pain using appropriate pain scale- Administer analgesics based on type and severity of pain and evaluate response- Implement non-pharmacological measures as appropriate and evaluate response- Consider cultural and social influences on pain and pain management-  Notify physician/advanced practitioner if interventions unsuccessful or patient reports new pain  Outcome: Adequate for Discharge     Problem: INFECTION - ADULT  Goal: Absence or prevention of progression during hospitalization  Description: INTERVENTIONS:- Assess and monitor for signs and symptoms of infection- Monitor lab/diagnostic results- Monitor all insertion sites, i.e. indwelling lines, tubes, and drains- Monitor endotracheal if appropriate and nasal secretions for changes in amount and color- [place] appropriate cooling/warming therapies per order- Administer medications as ordered- Instruct and encourage patient and family to use good hand hygiene technique- Identify and instruct in appropriate isolation precautions for identified infection/condition  Outcome: Adequate for Discharge  Goal: Absence of fever/infection during neutropenic period  Description: INTERVENTIONS:- Monitor WBC  Outcome: Adequate for Discharge     Problem: SAFETY ADULT  Goal: Patient will remain free of falls  Description: INTERVENTIONS:- Educate patient/family on patient safety including physical limitations- Instruct patient to call for assistance with activity - Consult OT/PT to assist with strengthening/mobility - Keep Call bell within reach- Keep bed low and locked with side rails adjusted as appropriate- Keep care items and personal belongings within reach- Initiate and maintain comfort rounds- Make Fall Risk Sign visible to staff- Offer Toileting every 2 Hours, in advance of need- Initiate/Maintain bed/ chair alarm- Obtain necessary fall risk management equipment: cane/ walker - Apply yellow socks and bracelet for high fall risk patients- Consider moving patient to room near nurses station  INTERVENTIONS:- Educate patient/family on patient safety including physical limitations- Instruct patient to call for assistance with activity - Consult OT/PT to assist with strengthening/mobility - Keep Call bell within reach- Keep bed  low and locked with side rails adjusted as appropriate- Keep care items and personal belongings within reach- Initiate and maintain comfort rounds- Make Fall Risk Sign visible to staff- Offer Toileting every 2 Hours, in advance of need- Initiate/Maintain bed/ chair alarm- Obtain necessary fall risk management equipment: walker- Apply yellow socks and bracelet for high fall risk patients- Consider moving patient to room near nurses station  Outcome: Adequate for Discharge  Goal: Maintain or return to baseline ADL function  Description: INTERVENTIONS:-  Assess patient's ability to carry out ADLs; assess patient's baseline for ADL function and identify physical deficits which impact ability to perform ADLs (bathing, care of mouth/teeth, toileting, grooming, dressing, etc.)- Assess/evaluate cause of self-care deficits - Assess range of motion- Assess patient's mobility; develop plan if impaired- Assess patient's need for assistive devices and provide as appropriate- Encourage maximum independence but intervene and supervise when necessary- Involve family in performance of ADLs- Assess for home care needs following discharge - Consider OT consult to assist with ADL evaluation and planning for discharge- Provide patient education as appropriate  Outcome: Adequate for Discharge  Goal: Maintains/Returns to pre admission functional level  Description: INTERVENTIONS:- Perform AM-PAC 6 Click Basic Mobility/ Daily Activity assessment daily.- Set and communicate daily mobility goal to care team and patient/family/caregiver. - Collaborate with rehabilitation services on mobility goals if consulted- Perform Range of Motion 2 times a day.- Reposition patient every 2 hours.- Dangle patient 2 times a day- Stand patient 2 times a day- Ambulate patient 2 times a day- Out of bed to chair 2 times a day - Out of bed for meals 2 times a day- Out of bed for toileting- Record patient progress and toleration of activity level   Outcome:  Adequate for Discharge     Problem: DISCHARGE PLANNING  Goal: Discharge to home or other facility with appropriate resources  Description: INTERVENTIONS:- Identify barriers to discharge w/patient and caregiver- Arrange for needed discharge resources and transportation as appropriate- Identify discharge learning needs (meds, wound care, etc.)- Arrange for interpretive services to assist at discharge as needed- Refer to Case Management Department for coordinating discharge planning if the patient needs post-hospital services based on physician/advanced practitioner order or complex needs related to functional status, cognitive ability, or social support system  Outcome: Adequate for Discharge     Problem: Knowledge Deficit  Goal: Patient/family/caregiver demonstrates understanding of disease process, treatment plan, medications, and discharge instructions  Description: Complete learning assessment and assess knowledge base.Interventions:- Provide teaching at level of understanding- Provide teaching via preferred learning methods  Outcome: Adequate for Discharge     Problem: RESPIRATORY - ADULT  Goal: Achieves optimal ventilation and oxygenation  Description: INTERVENTIONS:- Assess for changes in respiratory status- Assess for changes in mentation and behavior- Position to facilitate oxygenation and minimize respiratory effort- Oxygen administered by appropriate delivery if ordered- Initiate smoking cessation education as indicated- Encourage broncho-pulmonary hygiene including cough, deep breathe, Incentive Spirometry- Assess the need for suctioning and aspirate as needed- Assess and instruct to report SOB or any respiratory difficulty- Respiratory Therapy support as indicated  Outcome: Adequate for Discharge     Problem: METABOLIC, FLUID AND ELECTROLYTES - ADULT  Goal: Electrolytes maintained within normal limits  Description: INTERVENTIONS:- Monitor labs and assess patient for signs and symptoms of electrolyte  imbalances- Administer electrolyte replacement as ordered- Monitor response to electrolyte replacements, including repeat lab results as appropriate- Instruct patient on fluid and nutrition as appropriate  Outcome: Adequate for Discharge  Goal: Fluid balance maintained  Description: INTERVENTIONS:- Monitor labs - Monitor I/O and WT- Instruct patient on fluid and nutrition as appropriate- Assess for signs & symptoms of volume excess or deficit  Outcome: Adequate for Discharge  Goal: Glucose maintained within target range  Description: INTERVENTIONS:- Monitor Blood Glucose as ordered- Assess for signs and symptoms of hyperglycemia and hypoglycemia- Administer ordered medications to maintain glucose within target range- Assess nutritional intake and initiate nutrition service referral as needed  Outcome: Adequate for Discharge

## 2025-05-02 NOTE — ASSESSMENT & PLAN NOTE
O2 saturation in the ED 83% on RA, placed on 5 L NC and at 89%  Oxygen titrated to 6 L NC at 92 to 96% (4/30)  Received DuoNeb/steroids in the ED  Index of suspicion for multifocal pneumonia as source  Low index suspicion for COPD exacerbation given lack of wheezing/bronchospasm  Continue empiric antimicrobials  Continue home nebulizer treatments  Patient denies any home oxygen use, wean as able      Medically, the patient has improved significantly.  Without evidence of hemodynamic instability or continued SIRS criteria.  Patient with history of COPD and lung cancer-at baseline, compromised lung parenchyma-anticipate transient need for oxygen upon discharge  Certainly possible the patient may have some degree of chronic hypoxia  Underwent ambulatory and nocturnal pulse oximetry  Patient at rest can be maintained on room air  Patient with sleep requires 2 L nasal cannula  Patient with ambulation requiring 6 L nasal cannula  Home oxygen has been set up

## 2025-05-02 NOTE — DISCHARGE SUMMARY
"Discharge Summary - Hospitalist   Name: Manjit Hernandez 74 y.o. male I MRN: [id number]  Unit/Bed#: -01 I Date of Admission: 4/30/2025   Date of Service: 5/2/2025 I Hospital Day: 2     Assessment & Plan  Septic shock (HCC)  Shock now resolved  On presentation (4/30) met sepsis criteria with tachycardia, tachypnea, leukocytosis-hypotension refractory to volume  Chest CT (4/30): \"New bilateral multi lobar infiltrates, right greater than left attributed to pneumonia. Advise noncontrast chest CT follow-up in 8 weeks after appropriate medical therapy. Pulmonary emphysema. Trace right basilar pleural effusion.\"  Chest x-ray showed bilateral peripheral pneumonia  COVID/flu negative    Index suspicion for pulmonary source-multifocal pneumonia-initially received cefepime, vancomycin, azithromycin  Given improvement (4/30) de-escalated to Rocephin Zithromax  Urine Legionella and strep antigens negative      Today (5/2) patient is hemodynamically stable in absence of SIRS criteria-she will be set up with home oxygen and oral antibiotics to complete empiric course for pneumonia  He will discharge to home    CAP (community acquired pneumonia)  Patient presented (4/30) with dizziness, lightheadedness, and shortness of breath that started 4/29 PM.  As above  Urine strep pneumonia Legionella negative  (4/30) de-escalated to Rocephin Zithromax given improvement  Likely, etiology of acute hypoxemic respiratory failure and sepsis upon presentation      Will complete oral course of antimicrobials upon discharge  LEELEE (acute kidney injury) ([facility])  Resolved-likely prerenal in the setting of sepsis upon presentation  Lab Results   Component Value Date    CREATININE 1.06 05/02/2025    CREATININE 1.10 05/01/2025    CREATININE 1.40 (H) 04/30/2025    CREATININE 1.53 (H) 04/30/2025   Creatinine on admission noted to be 1.53  Baseline creatinine 0.8-1.0  Avoid nephrotoxic medications and hypotension    Acute respiratory failure with " hypoxia (HCC)  O2 saturation in the ED 83% on RA, placed on 5 L NC and at 89%  Oxygen titrated to 6 L NC at 92 to 96% (4/30)  Received DuoNeb/steroids in the ED  Index of suspicion for multifocal pneumonia as source  Low index suspicion for COPD exacerbation given lack of wheezing/bronchospasm  Continue empiric antimicrobials  Continue home nebulizer treatments  Patient denies any home oxygen use, wean as able      Medically, the patient has improved significantly.  Without evidence of hemodynamic instability or continued SIRS criteria.  Patient with history of COPD and lung cancer-at baseline, compromised lung parenchyma-anticipate transient need for oxygen upon discharge  Certainly possible the patient may have some degree of chronic hypoxia  Underwent ambulatory and nocturnal pulse oximetry  Patient at rest can be maintained on room air  Patient with sleep requires 2 L nasal cannula  Patient with ambulation requiring 6 L nasal cannula  Home oxygen has been set up  Essential (primary) hypertension  Patient remains normotensive at this juncture  Initially, hypotensive in the setting of sepsis-index suspicion for septic shock and hypovolemia  Now resolved    Chronic obstructive pulmonary disease with acute lower respiratory infection (HCC)  Noted history of COPD  Does not appear to be in exacerbation-without accessory respiratory muscle use or expiratory wheezing  Continue nebulized bronchodilators/home regimen  Received IV steroids in the ED  No need for steroid therapy upon discharge    PAF (paroxysmal atrial fibrillation) (HCC)  Continue home Xarelto and bisoprolol  Rate controlled  EKG on admission normal sinus rhythm rate of 64  Patient notes recent cardiac ablation-follows with [facility] cardiology  Chronic condition/stable  Chronic diastolic congestive heart failure (HCC)  Wt Readings from Last 3 Encounters:   05/02/25 134 kg (294 lb 12.8 oz)   05/15/24 (!) 138 kg (303 lb 5.7 oz)   10/30/23 (!) 148 kg (327 lb)    History of CHF with preserved ejection fraction  Recently underwent cardiac ablation at [facility].  Echo 10/2023: The left ventricular ejection fraction is 45-50% by visual estimation. Systolic function is mildly reduced.   Held home Lasix due to hypovolemia/sepsis   monitor volume status-reinitiate diuretic therapy upon discharge  History of pulmonary embolism  Noted history of PE s/p lung mass resection  Systemically anticoagulated  Chronic condition/stable     Medical Problems       Resolved Problems  Date Reviewed: 5/18/2024          Resolved    Hypotension 4/30/2025     Resolved by  Mansoor De La Torre DO          MESSAGE TO PCP (Vicki Cota DO) FOR FOLLOW UP:   Thank you for allowing us to participate in the care of your patient, Manjit Hernandez, who was hospitalized from 4/30/2025 through 05/02/25 with the admitting diagnosis of sepsis secondary to pneumonia.      Medication Changes:  Augmentin upon discharge-please see discharge medication reconciliation for complete list of discharge medications  Outpatient testing recommended:  None  If you have any additional questions or would like to discuss further, please feel free to contact me.  Mansoor De La Torre DO  [facility] Internal Medicine, Hospitalist, [phone]     Admission Date:   Admission Orders (From admission, onward)       Ordered        04/30/25 0515  INPATIENT ADMISSION  Once                          Discharge Date: 05/02/25    Consultations During Hospital Stay:  Critical care    Procedures Performed:   None    Significant Findings / Test Results:   None    Incidental Findings:   None      Test Results Pending at Discharge (will require follow up):   None     Complications: None    Reason for Admission: Dyspnea    Hospital Course:   Manjit Hernandez is a 74 y.o. male patient who originally presented to the hospital on 4/30/2025 due to dyspnea.  With SIRS criteria upon presentation.  Evidence of bilateral infiltrates.  Septic shock  "given hypotension.  Did not require vasopressors.  Resolved with fluids and antimicrobials.  Was maintained on Rocephin Zithromax over the course of hospitalization with transition to Augmentin to complete empiric course of brain discharge.  Also with acute hypoxemic respiratory failure upon presentation.  Weaned over the course of hospitalization however will require on discharge.  Anticipate transient need.  History of COPD but not in exacerbation.  In absence of SIRS criteria and hemodynamically stable today (5/2)    Please see problem specific assessment plan for details of patient's hospital course.          Please see above list of diagnoses and related plan for additional information.     Condition at Discharge: good    Discharge Day Visit / Exam:   Subjective: Patient feels \"completely better\" and is ready for discharge  Vitals: Blood Pressure: 164/75 (05/02/25 0709)  Pulse: 59 (05/02/25 0709)  Temperature: 97.5 °F (36.4 °C) (05/02/25 0709)  Temp Source: Oral (05/02/25 0709)  Respirations: 18 (05/02/25 0709)  Height: 6' 1\" (185.4 cm) (04/30/25 0640)  Weight - Scale: 134 kg (294 lb 12.8 oz) (05/02/25 0500)  SpO2: 93 % (05/02/25 0709)  Physical Exam   Constitutional:       General: He is not in acute distress.     Appearance: He is not ill-appearing.   Cardiovascular:      Rate and Rhythm: Regular rhythm.    Pulses: Normal pulses.      Heart sounds: Normal heart sounds. No murmur heard.  Pulmonary:      Effort: Pulmonary effort is normal. No respiratory distress.      Breath sounds: Decreased breath sounds present. No wheezing or rales.   Abdominal:      General: Bowel sounds are normal. There is no distension.      Palpations: Abdomen is soft.      Tenderness: There is no abdominal tenderness.   Musculoskeletal:         General: No swelling or tenderness.      Right lower leg: No edema.      Left lower leg: No edema.   Skin:     General: Skin is warm and dry.      Findings: No erythema or rash. "   Neurological:      General: No focal deficit present.      Mental Status: He is alert. Mental status is at baseline.   Psychiatric:         Mood and Affect: Mood normal.     Discussion with Family: Updated  (wife) at bedside.    Discharge instructions/Information to patient and family:   See after visit summary for information provided to patient and family.      Provisions for Follow-Up Care:  See after visit summary for information related to follow-up care and any pertinent home health orders.      Mobility at time of Discharge:   Basic Mobility Inpatient Raw Score: 21  JH-HLM Goal: 6: Walk 10 steps or more  JH-HLM Achieved: 7: Walk 25 feet or more  HLM Goal achieved. Continue to encourage appropriate mobility.     Disposition:   Home    Planned Readmission: No    Discharge Medications:  See after visit summary for reconciled discharge medications provided to patient and/or family.      Administrative Statements   Discharge Statement:  I have spent a total time of 44 minutes in caring for this patient on the day of the visit/encounter. >30 minutes of time was spent on: Instructions for management, Patient and family education, Importance of tx compliance, and Documenting in the medical record.    **Please Note: This note may have been constructed using a voice recognition system**

## 2025-05-03 LAB
BACTERIA SPT RESP CULT: ABNORMAL
GRAM STN SPEC: ABNORMAL

## 2025-05-04 LAB
BACTERIA BLD CULT: NORMAL
BACTERIA BLD CULT: NORMAL

## 2025-05-05 LAB
BACTERIA BLD CULT: NORMAL
BACTERIA BLD CULT: NORMAL

## 2025-05-20 ENCOUNTER — APPOINTMENT (OUTPATIENT)
Dept: LAB | Facility: HOSPITAL | Age: 75
End: 2025-05-20
Payer: MEDICARE

## 2025-05-20 DIAGNOSIS — I50.32 HEART FAILURE, DIASTOLIC, CHRONIC (HCC): ICD-10-CM

## 2025-05-20 LAB
ANION GAP SERPL CALCULATED.3IONS-SCNC: 8 MMOL/L (ref 4–13)
BNP SERPL-MCNC: 72 PG/ML (ref 0–100)
BUN SERPL-MCNC: 23 MG/DL (ref 5–25)
CHLORIDE SERPL-SCNC: 103 MMOL/L (ref 96–108)
CO2 SERPL-SCNC: 29 MMOL/L (ref 21–32)
CREAT SERPL-MCNC: 1.07 MG/DL (ref 0.6–1.3)
GFR SERPL CREATININE-BSD FRML MDRD: 67 ML/MIN/1.73SQ M
POTASSIUM SERPL-SCNC: 4.2 MMOL/L (ref 3.5–5.3)
SODIUM SERPL-SCNC: 140 MMOL/L (ref 135–147)

## 2025-05-20 PROCEDURE — 80051 ELECTROLYTE PANEL: CPT

## 2025-05-20 PROCEDURE — 84520 ASSAY OF UREA NITROGEN: CPT

## 2025-05-20 PROCEDURE — 36415 COLL VENOUS BLD VENIPUNCTURE: CPT

## 2025-05-20 PROCEDURE — 83880 ASSAY OF NATRIURETIC PEPTIDE: CPT

## 2025-05-20 PROCEDURE — 82565 ASSAY OF CREATININE: CPT
